# Patient Record
Sex: FEMALE | Race: BLACK OR AFRICAN AMERICAN | Employment: OTHER | URBAN - METROPOLITAN AREA
[De-identification: names, ages, dates, MRNs, and addresses within clinical notes are randomized per-mention and may not be internally consistent; named-entity substitution may affect disease eponyms.]

---

## 2017-01-17 DIAGNOSIS — E78.00 HYPERCHOLESTEREMIA: Primary | ICD-10-CM

## 2017-01-19 RX ORDER — ATORVASTATIN CALCIUM 40 MG/1
40 TABLET, FILM COATED ORAL DAILY
Qty: 90 TAB | Refills: 1 | Status: SHIPPED | OUTPATIENT
Start: 2017-01-19 | End: 2017-06-27 | Stop reason: SDUPTHER

## 2017-01-19 NOTE — TELEPHONE ENCOUNTER
[facility]itor 40mg every day.   Refill approved per verbal order per Dr Jarocho Hinkle.

## 2017-02-20 ENCOUNTER — TELEPHONE (OUTPATIENT)
Dept: CARDIOLOGY CLINIC | Age: 62
End: 2017-02-20

## 2017-02-20 NOTE — TELEPHONE ENCOUNTER
She needs information to sent to Dr. Mei Sanchez, [facility],  Phone [phone]. Please call her at [phone]. She did state that she also sent an email with more detail about this matter.      Thank you, Yousif Hsu

## 2017-02-27 ENCOUNTER — TELEPHONE (OUTPATIENT)
Dept: CARDIOLOGY CLINIC | Age: 62
End: 2017-02-27

## 2017-04-20 DIAGNOSIS — I10 ESSENTIAL HYPERTENSION: ICD-10-CM

## 2017-04-20 RX ORDER — LISINOPRIL 10 MG/1
10 TABLET ORAL DAILY
Qty: 90 TAB | Refills: 0 | Status: SHIPPED | OUTPATIENT
Start: 2017-04-20 | End: 2017-06-26 | Stop reason: SDUPTHER

## 2017-04-21 DIAGNOSIS — I10 ESSENTIAL HYPERTENSION: ICD-10-CM

## 2017-04-21 RX ORDER — BISOPROLOL FUMARATE AND HYDROCHLOROTHIAZIDE 5; 6.25 MG/1; MG/1
1 TABLET ORAL DAILY
Qty: 90 TAB | Refills: 0 | Status: SHIPPED | OUTPATIENT
Start: 2017-04-21 | End: 2017-07-05 | Stop reason: SDUPTHER

## 2017-04-21 NOTE — TELEPHONE ENCOUNTER
Refill is per verbal order of Dr. Trina Vences.    Requested Prescriptions     Pending Prescriptions Disp Refills    bisoprolol-hydroCHLOROthiazide (ZIAC) 5-6.25 mg per tablet 90 Tab 0     Sig: Take 1 Tab by mouth daily.

## 2017-06-09 ENCOUNTER — OFFICE VISIT (OUTPATIENT)
Dept: CARDIOLOGY CLINIC | Age: 62
End: 2017-06-09

## 2017-06-09 VITALS
DIASTOLIC BLOOD PRESSURE: 72 MMHG | HEIGHT: 65 IN | SYSTOLIC BLOOD PRESSURE: 140 MMHG | HEART RATE: 56 BPM | BODY MASS INDEX: 38.55 KG/M2 | WEIGHT: 231.4 LBS

## 2017-06-09 DIAGNOSIS — R00.2 PALPITATIONS: ICD-10-CM

## 2017-06-09 DIAGNOSIS — I10 ESSENTIAL HYPERTENSION: Primary | ICD-10-CM

## 2017-06-09 DIAGNOSIS — E78.5 HYPERLIPIDEMIA, UNSPECIFIED HYPERLIPIDEMIA TYPE: ICD-10-CM

## 2017-06-09 DIAGNOSIS — Z98.890 H/O MITRAL VALVE REPAIR: ICD-10-CM

## 2017-06-09 NOTE — PROGRESS NOTES
LAST OFFICE VISIT : 4/20/2016        ICD-10-CM ICD-9-CM   1. Essential hypertension I10 401.9   2. Hyperlipidemia, unspecified hyperlipidemia type E78.5 272.4   3. Palpitations R00.2 785.1   4. H/O mitral valve repair Z98.890 V15.1            Natalio Vega is a 65 y.o. female with hypertension, dyslipidemia, and mitral valve repair referred for 6 month follow up. Cardiac risk factors: obesity, hypertension, post-menopausal.  I have personally obtained the history from the patient. HISTORY OF PRESENTING ILLNESS      She is doing well. She has lost some weight with exercise. She does notes occasional right arm numbness. The patient denies chest pain/ shortness of breath, orthopnea, PND, LE edema, palpitations, syncope, presyncope or fatigue.         ACTIVE PROBLEM LIST     Patient Active Problem List    Diagnosis Date Noted    Mitral valve regurgitation 11/12/2010    Hypertension 11/12/2010    Hyperlipidemia 11/12/2010    Palpitations 11/12/2010           PAST MEDICAL HISTORY     Past Medical History:   Diagnosis Date    Diabetes ([facility] [address]     Hypertension     Mitral valve disorders     Other ill-defined conditions     mitral valve regurg    Sleep apnea with use of continuous positive airway pressure (CPAP)     Unspecified adverse effect of anesthesia     difficulty waking           PAST SURGICAL HISTORY     Past Surgical History:   Procedure Laterality Date    ABDOMEN SURGERY PROC UNLISTED  1980s    laparotomy    CARDIAC CATHETERIZATION      CHEST SURGERY PROCEDURE UNLISTED      mitral valve repair    HX GYN  1990    part hysterectomy    HX GYN  1987    c section          ALLERGIES     No Known Allergies       FAMILY HISTORY     Family History   Problem Relation Age of Onset    Heart Disease Mother     Cancer Mother     Hypertension Mother     Hypertension Father     Diabetes Father     Cancer Sister     Hypertension Sister     Hypertension Brother     Cancer Maternal Aunt     Diabetes Maternal Aunt     Cancer Maternal Grandmother     negative for cardiac disease       SOCIAL HISTORY     Social History     Social History    Marital status:      Spouse name: N/A    Number of children: N/A    Years of education: N/A     Occupational History     [employer]     Social History Main Topics    Smoking status: Never Smoker    Smokeless tobacco: None    Alcohol use 2.5 oz/week     5 Glasses of wine per week    Drug use: No    Sexual activity: Not Asked     Other Topics Concern    None     Social History Narrative         MEDICATIONS     Current Outpatient Prescriptions   Medication Sig    bisoprolol-hydroCHLOROthiazide (ZIAC) 5-6.25 mg per tablet Take 1 Tab by mouth daily.  lisinopril (PRINIVIL, ZESTRIL) 10 mg tablet Take 1 Tab by mouth daily.  atorvastatin (LIPITOR) 40 mg tablet Take 1 Tab by mouth daily.  multivitamin (ONE A DAY) tablet Take 1 Tab by mouth daily.  omega-3 fatty acids-vitamin e (FISH OIL) 1,000 mg cap Take 1 Cap by mouth daily.  magnesium oxide (MAG-OX) 400 mg tablet Take 400 mg by mouth daily.  metFORMIN (GLUCOPHAGE) 500 mg tablet Take  by mouth two (2) times daily (with meals).  CALCIUM CITRATE/VITAMIN D2 (CALCIUM CITRATE WITH D PO) Take 1 tablet by mouth daily.  aspirin delayed-release 81 mg tablet Take  by mouth daily. No current facility-administered medications for this visit. I have reviewed the nurses notes, vitals, problem list, allergy list, medical history, family, social history and medications. REVIEW OF SYMPTOMS      General: Pt denies excessive weight gain or loss. Pt is able to conduct ADL's  HEENT: Denies blurred vision, headaches, hearing loss, epistaxis and difficulty swallowing. Respiratory: Denies cough, congestion, shortness of breath, DOWLING, wheezing or stridor.   Cardiovascular: Denies precordial pain, palpitations, edema or PND  Gastrointestinal: Denies poor appetite, indigestion, abdominal pain or blood in stool  Genitourinary: Denies hematuria, dysuria, increased urinary frequency  Musculoskeletal: Denies joint pain or swelling from muscles or joints  Neurologic: Denies tremor, paresthesias, headache, or sensory motor disturbance  Psychiatric: Denies confusion, insomnia, depression  Integumentray: Denies rash, itching or ulcers. Hematologic: Denies easy bruising, bleeding     PHYSICAL EXAMINATION      Vitals:    06/09/17 1631   BP: 140/72   Pulse: (!) 56   Weight: 231 lb 6.4 oz (105 kg)   Height: 5' 5\" (1.651 m)     General: Well developed, in no acute distress. HEENT: No jaundice, oral mucosa moist, no oral ulcers  Neck: Supple, no stiffness, no lymphadenopathy, supple  Heart:  Normal S1/S2 negative S3 or S4. Regular, no murmur, gallop or rub, no jugular venous distention  Respiratory: Clear bilaterally x 4, no wheezing or rales     Extremities:  No edema, normal cap refill, no cyanosis. Musculoskeletal: No clubbing, no deformities  Neuro: A&Ox3, speech clear, gait stable, cooperative, no focal neurologic deficits  Skin: Skin color is normal. No rashes or lesions. Non diaphoretic, moist.  Vascular: 2+ pulses symmetric in all extremities         DIAGNOSTIC DATA     1. Echo  4/20/16- EF 60-65%, MV annular ring prosthesis exhibit normal function, TR mild  1/12/15- EF 55-60%, TR mild/mod, Pulm HTN mild ,Hx of TRR robotic assisted MVR using 29mm ATS adjustable ring There was mild regurgitation. 10/14/11: LVH, EF 55-60%     2. Loop  1/27/15 - SB 45-48 (only base line recorded)    3 . Cardiac catheterization   (10/14/10) : No significant CAD with severe MR  History TRR robotic assisted MVR using 29 mm ATS adjustable ring model [id number]     4.  Cholesterol profile   1/12/15: , HDL 58, ,    1/27/16: , HDL 57, LDL 90,   1/12/17- , HDL 49, LDL 90,        LABORATORY DATA            Lab Results   Component Value Date/Time    WBC 6.0 12/24/2012 06:15 AM    Hemoglobin (POC) 12.9 12/24/2012 06:26 AM    HGB 12.5 12/24/2012 06:15 AM    Hematocrit (POC) 38 12/24/2012 06:26 AM    HCT 38.6 12/24/2012 06:15 AM    PLATELET 650 73/28/4320 06:15 AM    MCV 83.2 12/24/2012 06:15 AM      Lab Results   Component Value Date/Time    Sodium 135 11/19/2010 03:55 AM    Potassium 4.2 11/19/2010 03:55 AM    Chloride 99 11/19/2010 03:55 AM    CO2 26 11/19/2010 03:55 AM    Anion gap 10 11/19/2010 03:55 AM    Glucose 64 11/19/2010 03:55 AM    BUN 10 11/19/2010 03:55 AM    Creatinine 1.0 11/19/2010 03:55 AM    BUN/Creatinine ratio 10 11/19/2010 03:55 AM    GFR est AA >60 11/19/2010 03:55 AM    GFR est non-AA >60 11/19/2010 03:55 AM    Calcium 9.8 11/19/2010 03:55 AM    Bilirubin, total 0.5 01/12/2017 10:02 AM    AST (SGOT) 18 01/12/2017 10:02 AM    Alk. phosphatase 89 01/12/2017 10:02 AM    Protein, total 7.3 01/12/2017 10:02 AM    Albumin 4.8 01/12/2017 10:02 AM    Globulin 3.2 06/18/2011 10:10 PM    A-G Ratio 1.5 06/18/2011 10:10 PM    ALT (SGPT) 23 01/12/2017 10:02 AM           ASSESSMENT/RECOMMENDATIONS:.      1. Mitral valve repair   -doing well with this  -will check in next 6 months to evaluate repair    -having some discomfort on the right side which could be nerve issues from using the DaVinci     2. Dyslipidemia  - lipids are at goal  -failed to get cholesterol checked before coming to office today  Will obtain FLP    3. Hypertension  -BP good on current medical regimen      4. Follow up in 6 months or PRN    Orders Placed This Encounter    HEPATIC FUNCTION PANEL    LIPID PANEL    2D ECHO COMPLETE ADULT (TTE) W OR WO CONTR     Standing Status:   Future     Standing Expiration Date:   6/29/2017     Order Specific Question:   Contrast Enhancement (Bubble Study, Definity, Optison) may be used if criteria listed in established evidence-based protocol has been identified.      Answer:   Yes     Order Specific Question:   Reason for Exam:     Answer:   chest pain,shortness of breath, AVR,MVR,AI, MR , AS        Follow-up Disposition:  Return in about 6 months (around 12/9/2017). I have discussed the diagnosis with  Isabella Herrera and the intended plan as seen in the above orders. Questions were answered concerning future plans. I have discussed medication side effects and warnings with the patient as well. Thank you,  Sebastien Harper MD for involving me in the care of  Isabella Herrera. Please do not hesitate to contact me for further questions/concerns. This note was written by rajan Reyes, as dictated by Christelle Plasencia MD.      Carson Carney. MD [place], [facility] [address]      [facility], [address] [place], [place] [address]Manuel Yehuda [address]      [phone] / [phone] Fax

## 2017-06-09 NOTE — MR AVS SNAPSHOT
Visit Information Date & Time Provider Department Dept. Phone Encounter #  
 6/9/2017  4:20 PM Shivani Fery MD [facility] [phone] [id number] Your Appointments 12/8/2017  3:00 PM  
ECHO CARDIOGRAMS 2D with ETHEL, Jose Nichols [facility] ([facility] SCHEDULING) Appt Note: echo at 3pm 6 mo fu appt dr Josy Orellana [address] [address]  
[phone]  
  
   
 [address] [address]  
  
    
 12/8/2017  4:20 PM  
ESTABLISHED PATIENT with Shivani Frey MD  
[facility] ([facility]O SCHEDULING) Appt Note: echo at 3pm 6 mo fu appt dr Josy Orellana [address] [address]  
[phone]  
  
   
 [address] [address] [address] [address] Upcoming Health Maintenance Date Due Hepatitis C Screening 1955 DTaP/Tdap/Td series (1 - Tdap) 3/20/1977 PAP AKA CERVICAL CYTOLOGY 3/20/1977 FOBT Q 1 YEAR AGE 50-75 3/20/2006 ZOSTER VACCINE AGE 60> 3/20/2016 BREAST CANCER SCRN MAMMOGRAM 4/30/2016 INFLUENZA AGE 9 TO ADULT 8/1/2017 Allergies as of 6/9/2017  Review Complete On: 6/9/2017 By: Shivani Frey MD  
 No Known Allergies Current Immunizations  Never Reviewed No immunizations on file. Not reviewed this visit You Were Diagnosed With   
  
 Codes Comments Essential hypertension    -  Primary ICD-10-CM: I10 
ICD-9-CM: 401.9 Hyperlipidemia, unspecified hyperlipidemia type     ICD-10-CM: E78.5 ICD-9-CM: 272.4 Palpitations     ICD-10-CM: R00.2 ICD-9-CM: 785.1 H/O mitral valve repair     ICD-10-CM: T30.135 ICD-9-CM: V15.1 Vitals BP Pulse Height(growth percentile) Weight(growth percentile) BMI OB Status 140/72 (!) 56 5' 5\" (1.651 m) 231 lb 6.4 oz (105 kg) 38.51 kg/m2 Hysterectomy Smoking Status Never Smoker Vitals History BMI and BSA Data Body Mass Index Body Surface Area 38.51 kg/m 2 2.19 m 2 Preferred Pharmacy Pharmacy Name Phone [facility] [address] [place] [address] [address] [phone] Your Updated Medication List  
  
   
This list is accurate as of: 6/9/17  4:52 PM.  Always use your most recent med list.  
  
  
  
  
 aspirin delayed-release 81 mg tablet Take  by mouth daily. atorvastatin 40 mg tablet Commonly known as:  LIPITOR Take 1 Tab by mouth daily. bisoprolol-hydroCHLOROthiazide 5-6.25 mg per tablet Commonly known as:  [facility] Take 1 Tab by mouth daily. CALCIUM CITRATE WITH D PO Take 1 tablet by mouth daily. FISH OIL 1,000 mg Cap Generic drug:  omega-3 fatty acids-vitamin e Take 1 Cap by mouth daily. lisinopril 10 mg tablet Commonly known as:  [place] Take 1 Tab by mouth daily. magnesium oxide 400 mg tablet Commonly known as:  MAG-OX Take 400 mg by mouth daily. metFORMIN 500 mg tablet Commonly known as:  GLUCOPHAGE Take  by mouth two (2) times daily (with meals). multivitamin tablet Commonly known as:  ONE A DAY Take 1 Tab by mouth daily. We Performed the Following HEPATIC FUNCTION PANEL [38953 CPT(R)] LIPID PANEL [64202 CPT(R)] To-Do List   
 06/09/2017 ECHO:  2D ECHO COMPLETE ADULT (TTE) W OR WO CONTR Introducing [facility]! Dear Kristi Harris: Thank you for requesting a [employer] account. Our records indicate that you already have an active [employer] account. You can access your account anytime at [email or web address] Did you know that you can access your hospital and ER discharge instructions at any time in [employer]? You can also review all of your test results from your hospital stay or ER visit. Additional Information If you have questions, please visit the Frequently Asked Questions section of the [employer] website at [email or web address]. [email or web address] Remember, [employer] is NOT to be used for urgent needs. For medical emergencies, dial 911. Now available from your iPhone and Android! Please provide this summary of care documentation to your next provider. Your primary care clinician is listed as Mili Bill. If you have any questions after today's visit, please call [phone].

## 2017-06-23 LAB
ALBUMIN SERPL-MCNC: 4.9 G/DL (ref 3.6–4.8)
ALP SERPL-CCNC: 86 IU/L (ref 39–117)
ALT SERPL-CCNC: 19 IU/L (ref 0–32)
AST SERPL-CCNC: 18 IU/L (ref 0–40)
BILIRUB DIRECT SERPL-MCNC: 0.14 MG/DL (ref 0–0.4)
BILIRUB SERPL-MCNC: 0.6 MG/DL (ref 0–1.2)
CHOLEST SERPL-MCNC: 171 MG/DL (ref 100–199)
HDLC SERPL-MCNC: 52 MG/DL
LDLC SERPL CALC-MCNC: 85 MG/DL (ref 0–99)
PROT SERPL-MCNC: 7.5 G/DL (ref 6–8.5)
TRIGL SERPL-MCNC: 168 MG/DL (ref 0–149)
VLDLC SERPL CALC-MCNC: 34 MG/DL (ref 5–40)

## 2017-06-26 DIAGNOSIS — I10 ESSENTIAL HYPERTENSION: ICD-10-CM

## 2017-06-26 RX ORDER — LISINOPRIL 10 MG/1
10 TABLET ORAL DAILY
Qty: 90 TAB | Refills: 3 | Status: SHIPPED | OUTPATIENT
Start: 2017-06-26 | End: 2018-02-21 | Stop reason: SDUPTHER

## 2017-06-26 NOTE — TELEPHONE ENCOUNTER
Medication refill request for:  Requested Prescriptions     Pending Prescriptions Disp Refills    lisinopril (PRINIVIL, ZESTRIL) 10 mg tablet 90 Tab 3     Sig: Take 1 Tab by mouth daily. Pharmacy verified.  Refill authorization approved per Dr. Haris Chacon

## 2017-06-27 RX ORDER — ATORVASTATIN CALCIUM 40 MG/1
40 TABLET, FILM COATED ORAL EVERY EVENING
Qty: 90 TAB | Refills: 1 | Status: SHIPPED | OUTPATIENT
Start: 2017-06-27 | End: 2019-05-20

## 2017-06-27 NOTE — TELEPHONE ENCOUNTER
Refill is per verbal order of Dr. Carolyn Armas.    Requested Prescriptions     Pending Prescriptions Disp Refills    atorvastatin (LIPITOR) 40 mg tablet 90 Tab 1     Sig: Take 1 Tab by mouth every evening.

## 2017-07-05 DIAGNOSIS — I10 ESSENTIAL HYPERTENSION: ICD-10-CM

## 2017-07-05 NOTE — TELEPHONE ENCOUNTER
Refill is per verbal order of Dr. Arnold Dick.    Requested Prescriptions     Pending Prescriptions Disp Refills    bisoprolol-hydroCHLOROthiazide (ZIAC) 5-6.25 mg per tablet 90 Tab 1     Sig: Take 1 Tab by mouth daily.

## 2017-07-06 RX ORDER — BISOPROLOL FUMARATE AND HYDROCHLOROTHIAZIDE 5; 6.25 MG/1; MG/1
1 TABLET ORAL DAILY
Qty: 90 TAB | Refills: 1 | Status: SHIPPED | OUTPATIENT
Start: 2017-07-06 | End: 2018-01-04 | Stop reason: SDUPTHER

## 2017-07-11 DIAGNOSIS — E78.00 HYPERCHOLESTEREMIA: Primary | ICD-10-CM

## 2018-01-04 DIAGNOSIS — I10 ESSENTIAL HYPERTENSION: ICD-10-CM

## 2018-01-04 RX ORDER — BISOPROLOL FUMARATE AND HYDROCHLOROTHIAZIDE 5; 6.25 MG/1; MG/1
1 TABLET ORAL DAILY
Qty: 90 TAB | Refills: 1 | Status: SHIPPED | OUTPATIENT
Start: 2018-01-04 | End: 2018-02-02 | Stop reason: ALTCHOICE

## 2018-01-04 NOTE — TELEPHONE ENCOUNTER
Refill is per verbal order of Dr. Elba Kayser.    Requested Prescriptions     Pending Prescriptions Disp Refills    bisoprolol-hydroCHLOROthiazide (ZIAC) 5-6.25 mg per tablet 90 Tab 1     Sig: Take 1 Tab by mouth daily.

## 2018-02-02 ENCOUNTER — CLINICAL SUPPORT (OUTPATIENT)
Dept: CARDIOLOGY CLINIC | Age: 63
End: 2018-02-02

## 2018-02-02 ENCOUNTER — OFFICE VISIT (OUTPATIENT)
Dept: CARDIOLOGY CLINIC | Age: 63
End: 2018-02-02

## 2018-02-02 VITALS
WEIGHT: 226 LBS | SYSTOLIC BLOOD PRESSURE: 116 MMHG | HEART RATE: 50 BPM | OXYGEN SATURATION: 97 % | DIASTOLIC BLOOD PRESSURE: 60 MMHG | BODY MASS INDEX: 37.61 KG/M2

## 2018-02-02 DIAGNOSIS — E78.5 HYPERLIPIDEMIA, UNSPECIFIED HYPERLIPIDEMIA TYPE: Chronic | ICD-10-CM

## 2018-02-02 DIAGNOSIS — I10 ESSENTIAL HYPERTENSION: Primary | Chronic | ICD-10-CM

## 2018-02-02 DIAGNOSIS — R00.2 PALPITATIONS: Chronic | ICD-10-CM

## 2018-02-02 DIAGNOSIS — I10 ESSENTIAL HYPERTENSION: Chronic | ICD-10-CM

## 2018-02-02 DIAGNOSIS — Z98.890 H/O MITRAL VALVE REPAIR: Primary | ICD-10-CM

## 2018-02-02 DIAGNOSIS — I34.0 MITRAL VALVE INSUFFICIENCY, UNSPECIFIED ETIOLOGY: ICD-10-CM

## 2018-02-02 RX ORDER — VITAMIN E 1000 UNIT
CAPSULE ORAL DAILY
COMMUNITY

## 2018-02-02 NOTE — PROGRESS NOTES
Pt has no complaints/no cardiac concerns    Visit Vitals    /60    Pulse (!) 50    Wt 226 lb (102.5 kg)    SpO2 97%    BMI 37.61 kg/m2

## 2018-02-02 NOTE — MR AVS SNAPSHOT
[address] [address] 
[phone] Patient: Kathrin Sanchez MRN: [id number] ROQ:2/31/[birth year]9 Visit Information Date & Time Provider Department Dept. Phone Encounter #  
 2/2/2018  4:00 PM Keyana Dos Santos MD [facility] Dejesus [phone] [id number] Your Appointments 2/12/2018  2:20 PM  
BLOOD PRESSURE with Keyana Dos Santos MD  
[facility] ([facility] SCHEDULING) Appt Note: 10 day BP check sll  
 [address] [address]  
[phone]  
  
   
 [address] [address] [address]  
  
    
 2/20/2018 11:00 AM  
New Patient with Preeti Deshpande MD  
[address] [address] Internal [facility] [address]) Appt Note: get est - CPE if time allows [address] [place]  
[address] [place]  
  
    
 8/10/2018  4:00 PM  
ESTABLISHED PATIENT with Keyana Dos Santos MD  
[facility] (LENO SCHEDULING) Appt Note: 6 mo fu appt  [address] [facility] [address] [address]  
[phone]  
  
   
 [address] [address] [address] Upcoming Health Maintenance Date Due Hepatitis C Screening 1955 DTaP/Tdap/Td series (1 - Tdap) 3/20/1977 PAP AKA CERVICAL CYTOLOGY 3/20/1977 FOBT Q 1 YEAR AGE 50-75 3/20/2006 ZOSTER VACCINE AGE 60> 1/20/2016 BREAST CANCER SCRN MAMMOGRAM 4/30/2016 Influenza Age 5 to Adult 8/1/2017 Allergies as of 2/2/2018  Review Complete On: 2/2/2018 By: Alexandra Youngblood LPN No Known Allergies Current Immunizations  Never Reviewed No immunizations on file. Not reviewed this visit You Were Diagnosed With   
  
 Codes Comments Essential hypertension    -  Primary ICD-10-CM: I10 
ICD-9-CM: 401.9 Vitals BP Pulse Weight(growth percentile) SpO2 BMI OB Status 116/60 (!) 50 226 lb (102.5 kg) 97% 37.61 kg/m2 Hysterectomy Smoking Status Never Smoker Vitals History BMI and BSA Data Body Mass Index Body Surface Area  
 37.61 kg/m 2 2.17 m 2 Preferred Pharmacy Pharmacy Name Phone [id number], [address] [place] [phone] Your Updated Medication List  
  
   
This list is accurate as of: 2/2/18  4:05 PM.  Always use your most recent med list.  
  
  
  
  
 aspirin delayed-release 81 mg tablet Take  by mouth daily. atorvastatin 40 mg tablet Commonly known as:  LIPITOR Take 1 Tab by mouth every evening. CALCIUM CITRATE WITH D PO Take 1 tablet by mouth daily. FISH OIL 1,000 mg Cap Generic drug:  omega-3 fatty acids-vitamin e Take 1 Cap by mouth daily. lisinopril 10 mg tablet Commonly known as:  [place] Take 1 Tab by mouth daily. magnesium oxide 400 mg tablet Commonly known as:  MAG-OX Take 400 mg by mouth daily. metFORMIN 500 mg tablet Commonly known as:  GLUCOPHAGE Take  by mouth two (2) times daily (with meals). multivitamin tablet Commonly known as:  ONE A DAY Take 1 Tab by mouth daily. OTHER  
daily. Indications: vitamin for hair and nails VITAMIN C 1,000 mg tablet Generic drug:  ascorbic acid (vitamin C) Take  by mouth daily. We Performed the Following AMB POC EKG ROUTINE W/ 12 LEADS, INTER & REP [55725 CPT(R)] Introducing [facility]! Dear Alphonso Maza: Thank you for requesting a [employer] account. Our records indicate that you already have an active [employer] account. You can access your account anytime at [email or web address] Did you know that you can access your hospital and ER discharge instructions at any time in [employer]? You can also review all of your test results from your hospital stay or ER visit. Additional Information If you have questions, please visit the Frequently Asked Questions section of the [employer] website at [email or web address] Remember, [address] is NOT to be used for urgent needs. For medical emergencies, dial 911. Now available from your iPhone and Android! Please provide this summary of care documentation to your next provider. Your primary care clinician is listed as Sera Mathis. If you have any questions after today's visit, please call [phone].

## 2018-02-02 NOTE — PROGRESS NOTES
LAST OFFICE VISIT : 2/2/2018        ICD-10-CM ICD-9-CM   1. Essential hypertension I10 401.9            Florinda Burns is a 65 y.o. female with hypertension, dyslipidemia, diabetes and mitral valve repair referred for 6 month follow up. Cardiac risk factors: family history, dyslipidemia, diabetes, obesity, hypertension, post-menopausal  I have personally obtained the history from the patient. HISTORY OF PRESENTING ILLNESS     Overall the pt states she is doing well. She denies any dizziness or lightheadedness. The pt states that she is using her rowing machine everyday. The patient denies chest pain/ shortness of breath, orthopnea, PND, LE edema, palpitations, syncope, presyncope or fatigue.          ACTIVE PROBLEM LIST     Patient Active Problem List    Diagnosis Date Noted    Mitral valve regurgitation 11/12/2010    Hypertension 11/12/2010    Hyperlipidemia 11/12/2010    Palpitations 11/12/2010           PAST MEDICAL HISTORY     Past Medical History:   Diagnosis Date    Diabetes ([address]     Hypertension     Mitral valve disorders(424.0)     Other ill-defined conditions(799.89)     mitral valve regurg    Sleep apnea with use of continuous positive airway pressure (CPAP)     Unspecified adverse effect of anesthesia     difficulty waking           PAST SURGICAL HISTORY     Past Surgical History:   Procedure Laterality Date    ABDOMEN SURGERY PROC UNLISTED  1980s    laparotomy    CARDIAC CATHETERIZATION      CHEST SURGERY PROCEDURE UNLISTED      mitral valve repair    HX GYN  1990    part hysterectomy    HX GYN  1987    c section          ALLERGIES     No Known Allergies       FAMILY HISTORY     Family History   Problem Relation Age of Onset    Heart Disease Mother     Cancer Mother     Hypertension Mother     Hypertension Father     Diabetes Father     Cancer Sister     Hypertension Sister     Hypertension Brother     Cancer Maternal Aunt     Diabetes Maternal Aunt     Cancer Maternal Grandmother     negative for cardiac disease       SOCIAL HISTORY     Social History     Social History    Marital status:      Spouse name: N/A    Number of children: N/A    Years of education: N/A     Occupational History     [employer]     Social History Main Topics    Smoking status: Never Smoker    Smokeless tobacco: Never Used    Alcohol use 2.5 oz/week     5 Glasses of wine per week    Drug use: No    Sexual activity: Not on file     Other Topics Concern    Not on file     Social History Narrative         MEDICATIONS     Current Outpatient Prescriptions   Medication Sig    ascorbic acid, vitamin C, (VITAMIN C) 1,000 mg tablet Take  by mouth daily.  OTHER daily. Indications: vitamin for hair and nails    atorvastatin (LIPITOR) 40 mg tablet Take 1 Tab by mouth every evening.  lisinopril (PRINIVIL, ZESTRIL) 10 mg tablet Take 1 Tab by mouth daily.  multivitamin (ONE A DAY) tablet Take 1 Tab by mouth daily.  omega-3 fatty acids-vitamin e (FISH OIL) 1,000 mg cap Take 1 Cap by mouth daily.  magnesium oxide (MAG-OX) 400 mg tablet Take 400 mg by mouth daily.  metFORMIN (GLUCOPHAGE) 500 mg tablet Take  by mouth two (2) times daily (with meals).  aspirin delayed-release 81 mg tablet Take  by mouth daily.  CALCIUM CITRATE/VITAMIN D2 (CALCIUM CITRATE WITH D PO) Take 1 tablet by mouth daily. No current facility-administered medications for this visit. I have reviewed the nurses notes, vitals, problem list, allergy list, medical history, family, social history and medications. REVIEW OF SYMPTOMS      General: Pt denies excessive weight gain or loss. Pt is able to conduct ADL's  HEENT: Denies blurred vision, headaches, hearing loss, epistaxis and difficulty swallowing. Respiratory: Denies cough, congestion, shortness of breath, DOWLING, wheezing or stridor.   Cardiovascular: Denies precordial pain, palpitations, edema or PND  Gastrointestinal: Denies poor appetite, indigestion, abdominal pain or blood in stool  Genitourinary: Denies hematuria, dysuria, increased urinary frequency  Musculoskeletal: Denies joint pain or swelling from muscles or joints  Neurologic: Denies tremor, paresthesias, headache, or sensory motor disturbance  Psychiatric: Denies confusion, insomnia, depression  Integumentray: Denies rash, itching or ulcers. Hematologic: Denies easy bruising, bleeding     PHYSICAL EXAMINATION      Vitals:    02/02/18 1537   BP: 116/60   Pulse: (!) 50   SpO2: 97%   Weight: 226 lb (102.5 kg)     General: Well developed, in no acute distress. HEENT: No jaundice, oral mucosa moist, no oral ulcers  Neck: Supple, no stiffness, no lymphadenopathy, supple  Heart:  Normal S1/S2 negative S3 or S4. Regular, no murmur, gallop or rub, no jugular venous distention  Respiratory: Clear bilaterally x 4, no wheezing or rales  Abdomen:   Soft, non-tender, bowel sounds are active.   Extremities:  No edema, normal cap refill, no cyanosis. Musculoskeletal: No clubbing, no deformities  Neuro: A&Ox3, speech clear, gait stable, cooperative, no focal neurologic deficits  Skin: Skin color is normal. No rashes or lesions. Non diaphoretic, moist.  Vascular: 2+ pulses symmetric in all extremities        EKG:      DIAGNOSTIC DATA     1. Echo  4/20/16- EF 60-65%, MV annular ring prosthesis exhibit normal function, TR mild  1/12/15- EF 55-60%, TR mild/mod, Pulm HTN mild ,Hx of TRR robotic assisted MVR using 29mm ATS adjustable ring There was mild regurgitation. 10/14/11: LVH, EF 55-60%     2. Loop  1/27/15 - SB 45-48 (only base line recorded)    3 . Cardiac catheterization   (10/14/10) : No significant CAD with severe MR  History TRR robotic assisted MVR using 29 mm ATS adjustable ring model [id number]     4.  Cholesterol profile   1/12/15: , HDL 58, ,    1/27/16: , HDL 57, LDL 90,   1/12/17- , HDL 49, LDL 90, TG 107  6/22/17- , HDL 52, LDL 85,          LABORATORY DATA            Lab Results   Component Value Date/Time    WBC 6.0 12/24/2012 06:15 AM    Hemoglobin (POC) 12.9 12/24/2012 06:26 AM    HGB 12.5 12/24/2012 06:15 AM    Hematocrit (POC) 38 12/24/2012 06:26 AM    HCT 38.6 12/24/2012 06:15 AM    PLATELET 617 15/70/6981 06:15 AM    MCV 83.2 12/24/2012 06:15 AM      Lab Results   Component Value Date/Time    Sodium 135 11/19/2010 03:55 AM    Potassium 4.2 11/19/2010 03:55 AM    Chloride 99 11/19/2010 03:55 AM    CO2 26 11/19/2010 03:55 AM    Anion gap 10 11/19/2010 03:55 AM    Glucose 64 11/19/2010 03:55 AM    BUN 10 11/19/2010 03:55 AM    Creatinine 1.0 11/19/2010 03:55 AM    BUN/Creatinine ratio 10 11/19/2010 03:55 AM    GFR est AA >60 11/19/2010 03:55 AM    GFR est non-AA >60 11/19/2010 03:55 AM    Calcium 9.8 11/19/2010 03:55 AM    Bilirubin, total 0.6 06/22/2017 09:25 AM    AST (SGOT) 18 06/22/2017 09:25 AM    Alk. phosphatase 86 06/22/2017 09:25 AM    Protein, total 7.5 06/22/2017 09:25 AM    Albumin 4.9 06/22/2017 09:25 AM    Globulin 3.2 06/18/2011 10:10 PM    A-G Ratio 1.5 06/18/2011 10:10 PM    ALT (SGPT) 19 06/22/2017 09:25 AM           ASSESSMENT/RECOMMENDATIONS:.      1. Mitral valve repair  - She is doing fine, no chest discomfort, will read her echo that was performed today. 2. Dyslipidemia  - Lipids are at goal.   3. Hypertension  - Blood pressure is under good control, I am going to discontinue her Ziac as her heart rate is low. I will have her return in 10 days for a BP check. 4. BILLY  - She is wearing her CPAP machine. 5. Diabetes mellitus  - last HGB A1C was 6.7%, goal should be 6% or below. 6. Return in 6 months or PRN. Orders Placed This Encounter    AMB POC EKG ROUTINE W/ 12 LEADS, INTER & REP     Order Specific Question:   Reason for Exam:     Answer:   htn    ascorbic acid, vitamin C, (VITAMIN C) 1,000 mg tablet     Sig: Take  by mouth daily.  OTHER     Sig: daily. Indications: vitamin for hair and nails        Follow-up Disposition: Not on File      I have discussed the diagnosis with  Maile Lee and the intended plan as seen in the above orders. Questions were answered concerning future plans. I have discussed medication side effects and warnings with the patient as well. Thank you,  Sinan Alba MD for involving me in the care of  Maile Lee. Please do not hesitate to contact me for further questions/concerns. ADDENDUM:    (4/20/18): Ms. Tony is a low cardiac risk for a low risk procedure and may proceed. No cardiac testing is  needed. Please call if you have any additional questions. Kwame Pearce MD, [address] [place] [address] Rd., [address]      [facility], [address] [place] [address]      [phone] / [phone] Fax

## 2018-02-06 ENCOUNTER — DOCUMENTATION ONLY (OUTPATIENT)
Dept: CARDIOLOGY CLINIC | Age: 63
End: 2018-02-06

## 2018-02-07 ENCOUNTER — TELEPHONE (OUTPATIENT)
Dept: CARDIOLOGY CLINIC | Age: 63
End: 2018-02-07

## 2018-02-12 ENCOUNTER — CLINICAL SUPPORT (OUTPATIENT)
Dept: CARDIOLOGY CLINIC | Age: 63
End: 2018-02-12

## 2018-02-12 VITALS — HEART RATE: 60 BPM | SYSTOLIC BLOOD PRESSURE: 124 MMHG | DIASTOLIC BLOOD PRESSURE: 72 MMHG

## 2018-02-12 DIAGNOSIS — R00.1 BRADYCARDIA: Primary | ICD-10-CM

## 2018-02-21 ENCOUNTER — OFFICE VISIT (OUTPATIENT)
Dept: INTERNAL MEDICINE CLINIC | Age: 63
End: 2018-02-21

## 2018-02-21 VITALS
TEMPERATURE: 98.9 F | HEART RATE: 85 BPM | OXYGEN SATURATION: 97 % | SYSTOLIC BLOOD PRESSURE: 154 MMHG | HEIGHT: 64 IN | BODY MASS INDEX: 38.48 KG/M2 | WEIGHT: 225.4 LBS | RESPIRATION RATE: 16 BRPM | DIASTOLIC BLOOD PRESSURE: 86 MMHG

## 2018-02-21 DIAGNOSIS — R79.9 ABNORMAL BLOOD CHEMISTRY: ICD-10-CM

## 2018-02-21 DIAGNOSIS — R73.03 PREDIABETES: ICD-10-CM

## 2018-02-21 DIAGNOSIS — E66.9 OBESITY (BMI 30-39.9): ICD-10-CM

## 2018-02-21 DIAGNOSIS — Z00.00 ROUTINE GENERAL MEDICAL EXAMINATION AT A HEALTH CARE FACILITY: ICD-10-CM

## 2018-02-21 DIAGNOSIS — I10 ESSENTIAL HYPERTENSION: Primary | ICD-10-CM

## 2018-02-21 DIAGNOSIS — E55.9 VITAMIN D DEFICIENCY: ICD-10-CM

## 2018-02-21 RX ORDER — LISINOPRIL 10 MG/1
10 TABLET ORAL DAILY
Qty: 90 TAB | Refills: 3 | Status: SHIPPED | OUTPATIENT
Start: 2018-02-21 | End: 2019-03-18 | Stop reason: SDUPTHER

## 2018-02-21 RX ORDER — METFORMIN HYDROCHLORIDE 500 MG/1
500 TABLET ORAL 2 TIMES DAILY WITH MEALS
Qty: 180 TAB | Refills: 1 | Status: SHIPPED | OUTPATIENT
Start: 2018-02-21 | End: 2018-08-04 | Stop reason: SDUPTHER

## 2018-02-21 RX ORDER — METFORMIN HYDROCHLORIDE 500 MG/1
500 TABLET ORAL 2 TIMES DAILY WITH MEALS
Qty: 30 TAB | Refills: 0 | Status: SHIPPED | OUTPATIENT
Start: 2018-02-21 | End: 2019-05-20 | Stop reason: SDUPTHER

## 2018-02-21 RX ORDER — LANOLIN ALCOHOL/MO/W.PET/CERES
400 CREAM (GRAM) TOPICAL DAILY
COMMUNITY
End: 2019-05-20 | Stop reason: SDUPTHER

## 2018-02-21 NOTE — PATIENT INSTRUCTIONS
It was a pleasure to meet you! As discussed:    Health Maintenance   I have ordered your age appropriate labs please complete them. You will need to fast 10-12hrs before your appointment. Start paying more attention to your diet and start exercising. Goal for exercise is 150minutes of moderate exercise a week and diet goal is to eat 50% fruits and vegetables with minimal sugar, fat and oil daily. See health.gov or choosemyplate.gov for more details. Your cervical cancer and breast cancer screening will be completed by your ob/ gyn as scheduled. Learning About Diabetes Food Guidelines  Your Care Instructions  Meal planning is important to manage diabetes. It helps keep your blood sugar at a target level (which you set with your doctor). You don't have to eat special foods. You can eat what your family eats, including sweets once in a while. But you do have to pay attention to how often you eat and how much you eat of certain foods. You may want to work with a dietitian or a certified diabetes educator (CDE) to help you plan meals and snacks. A dietitian or CDE can also help you lose weight if that is one of your goals. What should you know about eating carbs? Managing the amount of carbohydrate (carbs) you eat is an important part of healthy meals when you have diabetes. Carbohydrate is found in many foods. · Learn which foods have carbs. And learn the amounts of carbs in different foods. ¨ Bread, cereal, pasta, and rice have about 15 grams of carbs in a serving. A serving is 1 slice of bread (1 ounce), ½ cup of cooked cereal, or 1/3 cup of cooked pasta or rice. ¨ Fruits have 15 grams of carbs in a serving. A serving is 1 small fresh fruit, such as an apple or orange; ½ of a banana; ½ cup of cooked or canned fruit; ½ cup of fruit juice; 1 cup of melon or raspberries; or 2 tablespoons of dried fruit. ¨ Milk and no-sugar-added yogurt have 15 grams of carbs in a serving.  A serving is 1 cup of milk or 2/3 cup of no-sugar-added yogurt. ¨ Starchy vegetables have 15 grams of carbs in a serving. A serving is ½ cup of mashed potatoes or sweet potato; 1 cup winter squash; ½ of a small baked potato; ½ cup of cooked beans; or ½ cup cooked corn or green peas. · Learn how much carbs to eat each day and at each meal. A dietitian or CDE can teach you how to keep track of the amount of carbs you eat. This is called carbohydrate counting. · If you are not sure how to count carbohydrate grams, use the Plate Method to plan meals. It is a good, quick way to make sure that you have a balanced meal. It also helps you spread carbs throughout the day. ¨ Divide your plate by types of foods. Put non-starchy vegetables on half the plate, meat or other protein food on one-quarter of the plate, and a grain or starchy vegetable in the final quarter of the plate. To this you can add a small piece of fruit and 1 cup of milk or yogurt, depending on how many carbs you are supposed to eat at a meal.  · Try to eat about the same amount of carbs at each meal. Do not \"save up\" your daily allowance of carbs to eat at one meal.  · Proteins have very little or no carbs per serving. Examples of proteins are beef, chicken, turkey, fish, eggs, tofu, cheese, cottage cheese, and peanut butter. A serving size of meat is 3 ounces, which is about the size of a deck of cards. Examples of meat substitute serving sizes (equal to 1 ounce of meat) are 1/4 cup of cottage cheese, 1 egg, 1 tablespoon of peanut butter, and ½ cup of tofu. How can you eat out and still eat healthy? · Learn to estimate the serving sizes of foods that have carbohydrate. If you measure food at home, it will be easier to estimate the amount in a serving of restaurant food. · If the meal you order has too much carbohydrate (such as potatoes, corn, or baked beans), ask to have a low-carbohydrate food instead. Ask for a salad or green vegetables.   · If you use insulin, check your blood sugar before and after eating out to help you plan how much to eat in the future. · If you eat more carbohydrate at a meal than you had planned, take a walk or do other exercise. This will help lower your blood sugar. What else should you know? · Limit saturated fat, such as the fat from meat and dairy products. This is a healthy choice because people who have diabetes are at higher risk of heart disease. So choose lean cuts of meat and nonfat or low-fat dairy products. Use olive or canola oil instead of butter or shortening when cooking. · Don't skip meals. Your blood sugar may drop too low if you skip meals and take insulin or certain medicines for diabetes. · Check with your doctor before you drink alcohol. Alcohol can cause your blood sugar to drop too low. Alcohol can also cause a bad reaction if you take certain diabetes medicines. Follow-up care is a key part of your treatment and safety. Be sure to make and go to all appointments, and call your doctor if you are having problems. It's also a good idea to know your test results and keep a list of the medicines you take. Where can you learn more? Go to http://andrea-christo.info/. Enter C842 in the search box to learn more about \"Learning About Diabetes Food Guidelines. \"  Current as of: March 13, 2017  Content Version: 11.4  © 7980-7060 [employer]. Care instructions adapted under license by [employer] (which disclaims liability or warranty for this information). If you have questions about a medical condition or this instruction, always ask your healthcare professional. [place] [address] any warranty or liability for your use of this information.

## 2018-02-21 NOTE — MR AVS SNAPSHOT
[address] [facility] [address] [facility] 
[phone] Patient: Carine Arellano MRN: [id number] MPS:9/15/7463 Visit Information Date & Time Provider Department Dept. Phone Encounter #  
 2/21/2018  1:30 PM Erich Wang MD [address] [address] Internal Medicine [phone] [id number] Follow-up Instructions Return in about 3 months (around 5/21/2018) for Physical with PAP - 30 minute appointment. Your Appointments 8/10/2018  4:00 PM  
ESTABLISHED PATIENT with Elle Sanderson MD  
[facility] (LENO SCHEDULING) Appt Note: 6 mo fu appt  [address] [address] [phone]  
  
   
 [address] [address] Upcoming Health Maintenance Date Due Hepatitis C Screening 1955 DTaP/Tdap/Td series (1 - Tdap) 3/20/1977 PAP AKA CERVICAL CYTOLOGY 3/20/1977 FOBT Q 1 YEAR AGE 50-75 3/20/2006 ZOSTER VACCINE AGE 60> 1/20/2016 BREAST CANCER SCRN MAMMOGRAM 4/30/2016 Influenza Age 5 to Adult 8/1/2017 Allergies as of 2/21/2018  Review Complete On: 2/21/2018 By: Erich Wang MD  
  
 Severity Noted Reaction Type Reactions Other Plant, Animal, Environmental  02/21/2018    Other (comments)  
 hayfever Current Immunizations  Never Reviewed No immunizations on file. Not reviewed this visit You Were Diagnosed With   
  
 Codes Comments Essential hypertension    -  Primary ICD-10-CM: I10 
ICD-9-CM: 401.9 Routine general medical examination at a health care facility     ICD-10-CM: Z00.00 ICD-9-CM: V70.0 Prediabetes     ICD-10-CM: R73.03 
ICD-9-CM: 790.29 Vitamin D deficiency     ICD-10-CM: E55.9 ICD-9-CM: 268.9 Abnormal blood chemistry     ICD-10-CM: R79.9 ICD-9-CM: 790.6 Obesity (BMI 30-39. 9)     ICD-10-CM: E66.9 ICD-9-CM: 278.00 Vitals BP Pulse Temp Resp Height(growth percentile) Weight(growth percentile) 154/86 (BP 1 Location: Right arm, BP Patient Position: Sitting) 85 98.9 °F (37.2 °C) (Oral) 16 5' 3.82\" (1.621 m) 225 lb 6.4 oz (102.2 kg) SpO2 BMI OB Status Smoking Status 97% 38.91 kg/m2 Hysterectomy Never Smoker Vitals History BMI and BSA Data Body Mass Index Body Surface Area  
 38.91 kg/m 2 2.15 m 2 Preferred Pharmacy Pharmacy Name Phone [id number], [address] [facility] [place] [phone] Your Updated Medication List  
  
   
This list is accurate as of 2/21/18  2:16 PM.  Always use your most recent med list.  
  
  
  
  
 aspirin delayed-release 81 mg tablet Take  by mouth daily. atorvastatin 40 mg tablet Commonly known as:  LIPITOR Take 1 Tab by mouth every evening. CALCIUM CITRATE WITH D PO Take 1 tablet by mouth daily. FISH OIL 1,000 mg Cap Generic drug:  omega-3 fatty acids-vitamin e Take 1 Cap by mouth daily. lisinopril 10 mg tablet Commonly known as:  Leeland Nutley Take 1 Tab by mouth daily. * magnesium oxide 400 mg tablet Commonly known as:  MAG-OX Take 400 mg by mouth daily. * magnesium oxide 400 mg tablet Commonly known as:  MAG-OX Take 400 mg by mouth daily. metFORMIN 500 mg tablet Commonly known as:  GLUCOPHAGE Take 1 Tab by mouth two (2) times daily (with meals). multivitamin tablet Commonly known as:  ONE A DAY Take 1 Tab by mouth daily. OTHER  
daily. Indications: vitamin for hair and nails OTHER Indications: Tumeric VITAMIN C 1,000 mg tablet Generic drug:  ascorbic acid (vitamin C) Take  by mouth daily. * Notice: This list has 2 medication(s) that are the same as other medications prescribed for you. Read the directions carefully, and ask your doctor or other care provider to review them with you. Prescriptions Sent to Pharmacy Refills  
 metFORMIN (GLUCOPHAGE) 500 mg tablet 1 Sig: Take 1 Tab by mouth two (2) times daily (with meals). Class: Normal  
 Pharmacy: [address] [place] [address] Ph #: [phone] Route: Oral  
 lisinopril (PRINIVIL, ZESTRIL) 10 mg tablet 3 Sig: Take 1 Tab by mouth daily. Class: Normal  
 Pharmacy: [address] [place] [address] Ph #: [phone] Route: Oral  
  
We Performed the Following CBC WITH AUTOMATED DIFF [06128 CPT(R)] HEMOGLOBIN A1C WITH EAG [62423 CPT(R)] HEPATITIS C AB [04358 CPT(R)] LIPID PANEL [15698 CPT(R)] METABOLIC PANEL, COMPREHENSIVE [98588 CPT(R)] REFERRAL TO NUTRITION [REF50 Custom] Comments:  
 Please evaluate patient for weight management; obesity THYROID PANEL [id number] CPT(R)] TSH 3RD GENERATION [30444 CPT(R)] VITAMIN D, 25 HYDROXY [id number] CPT(R)] Follow-up Instructions Return in about 3 months (around 5/21/2018) for Physical with PAP - 30 minute appointment. Referral Information Referral ID Referred By Referred To  
  
 [id number] Maria C Pat   
   [address] [place] [address] Phone: [phone] Visits Status Start Date End Date 1 New Request 2/21/18 2/21/19 If your referral has a status of pending review or denied, additional information will be sent to support the outcome of this decision. Patient Instructions It was a pleasure to meet you! As discussed: 
 
Health Maintenance I have ordered your age appropriate labs please complete them. You will need to fast 10-12hrs before your appointment. Start paying more attention to your diet and start exercising. Goal for exercise is 150minutes of moderate exercise a week and diet goal is to eat 50% fruits and vegetables with minimal sugar, fat and oil daily.  See health.gov or chooseFidelisplate.gov for more details. Your cervical cancer and breast cancer screening will be completed by your ob/ gyn as scheduled. Learning About Diabetes Food Guidelines Your Care Instructions Meal planning is important to manage diabetes. It helps keep your blood sugar at a target level (which you set with your doctor). You don't have to eat special foods. You can eat what your family eats, including sweets once in a while. But you do have to pay attention to how often you eat and how much you eat of certain foods. You may want to work with a dietitian or a certified diabetes educator (CDE) to help you plan meals and snacks. A dietitian or CDE can also help you lose weight if that is one of your goals. What should you know about eating carbs? Managing the amount of carbohydrate (carbs) you eat is an important part of healthy meals when you have diabetes. Carbohydrate is found in many foods. · Learn which foods have carbs. And learn the amounts of carbs in different foods. ¨ Bread, cereal, pasta, and rice have about 15 grams of carbs in a serving. A serving is 1 slice of bread (1 ounce), ½ cup of cooked cereal, or 1/3 cup of cooked pasta or rice. ¨ Fruits have 15 grams of carbs in a serving. A serving is 1 small fresh fruit, such as an apple or orange; ½ of a banana; ½ cup of cooked or canned fruit; ½ cup of fruit juice; 1 cup of melon or raspberries; or 2 tablespoons of dried fruit. ¨ Milk and no-sugar-added yogurt have 15 grams of carbs in a serving. A serving is 1 cup of milk or 2/3 cup of no-sugar-added yogurt. ¨ Starchy vegetables have 15 grams of carbs in a serving. A serving is ½ cup of mashed potatoes or sweet potato; 1 cup winter squash; ½ of a small baked potato; ½ cup of cooked beans; or ½ cup cooked corn or green peas. · Learn how much carbs to eat each day and at each meal. A dietitian or CDE can teach you how to keep track of the amount of carbs you eat.  This is called carbohydrate counting. · If you are not sure how to count carbohydrate grams, use the Plate Method to plan meals. It is a good, quick way to make sure that you have a balanced meal. It also helps you spread carbs throughout the day. ¨ Divide your plate by types of foods. Put non-starchy vegetables on half the plate, meat or other protein food on one-quarter of the plate, and a grain or starchy vegetable in the final quarter of the plate. To this you can add a small piece of fruit and 1 cup of milk or yogurt, depending on how many carbs you are supposed to eat at a meal. 
· Try to eat about the same amount of carbs at each meal. Do not \"save up\" your daily allowance of carbs to eat at one meal. 
· Proteins have very little or no carbs per serving. Examples of proteins are beef, chicken, turkey, fish, eggs, tofu, cheese, cottage cheese, and peanut butter. A serving size of meat is 3 ounces, which is about the size of a deck of cards. Examples of meat substitute serving sizes (equal to 1 ounce of meat) are 1/4 cup of cottage cheese, 1 egg, 1 tablespoon of peanut butter, and ½ cup of tofu. How can you eat out and still eat healthy? · Learn to estimate the serving sizes of foods that have carbohydrate. If you measure food at home, it will be easier to estimate the amount in a serving of restaurant food. · If the meal you order has too much carbohydrate (such as potatoes, corn, or baked beans), ask to have a low-carbohydrate food instead. Ask for a salad or green vegetables. · If you use insulin, check your blood sugar before and after eating out to help you plan how much to eat in the future. · If you eat more carbohydrate at a meal than you had planned, take a walk or do other exercise. This will help lower your blood sugar. What else should you know? · Limit saturated fat, such as the fat from meat and dairy products.  This is a healthy choice because people who have diabetes are at higher risk of heart disease. So choose lean cuts of meat and nonfat or low-fat dairy products. Use olive or canola oil instead of butter or shortening when cooking. · Don't skip meals. Your blood sugar may drop too low if you skip meals and take insulin or certain medicines for diabetes. · Check with your doctor before you drink alcohol. Alcohol can cause your blood sugar to drop too low. Alcohol can also cause a bad reaction if you take certain diabetes medicines. Follow-up care is a key part of your treatment and safety. Be sure to make and go to all appointments, and call your doctor if you are having problems. It's also a good idea to know your test results and keep a list of the medicines you take. Where can you learn more? Go to http://andrea-christo.info/. Enter G455 in the search box to learn more about \"Learning About Diabetes Food Guidelines. \" Current as of: March 13, 2017 Content Version: 11.4 © 6405-3043 [employer]. Care instructions adapted under license by [employer] (which disclaims liability or warranty for this information). If you have questions about a medical condition or this instruction, always ask your healthcare professional. [address] any warranty or liability for your use of this information. Introducing [facility]! Dear Jessica Gonzalez: Thank you for requesting a [employer] account. Our records indicate that you already have an active [employer] account. You can access your account anytime at [email or web address] Did you know that you can access your hospital and ER discharge instructions at any time in [employer]? You can also review all of your test results from your hospital stay or ER visit. Additional Information If you have questions, please visit the Frequently Asked Questions section of the [employer] website at [email or web address] Remember, [email or web address] is NOT to be used for urgent needs. For medical emergencies, dial 911. Now available from your iPhone and Android! Please provide this summary of care documentation to your next provider. Your primary care clinician is listed as Jasbir Heading. If you have any questions after today's visit, please call [phone].

## 2018-02-21 NOTE — PROGRESS NOTES
HISTORY OF PRESENT ILLNESS  Marian Bender is a 65 y.o. female. HPI  New Patient  Marian Bender is a new patient. Prior care: her prior care was at Dr. Mindy Pedraza  . Last seen there . Records have been requested. ER Visits/ Hospitalizations:     Health Maintenance  Breast Cancer screening: due 2018  Colorectal Cancer screening: Colonoscopy 2017  Cervical Cancer screening: Due now   FHx: Multiple Myeloma, Breast Cancer, Prostate CA, Thyroid CA     Diabetes Review:  The patient has diabetes, hypertension, hyperlipidemia and obesity. Strong FHx of Heart Disease. Body mass index is 38.91 kg/(m^2). Diet and Lifestyle: nonsmoker  Home Glucose  Monitoring: is not measured at home. Pertinent ROS: taking medications as instructed- Has not taken her medication in 3 days because it ran out, no medication side effects noted, no TIA's, no chest pain on exertion, no dyspnea on exertion, no swelling of ankles. SHx: Sister is my patient Willow Jasso; . 2 children Father living. [place] native;    Has friend dying of liver cancer. She has concern about a spot on her liver. Review of Systems   Constitutional: Negative for diaphoresis, fever and weight loss. Eyes: Negative for blurred vision and pain. Respiratory: Negative for shortness of breath. Cardiovascular: Negative for chest pain, orthopnea and leg swelling. Neurological: Negative for dizziness, focal weakness and headaches. Occasional vertigo    Psychiatric/Behavioral: Negative for depression. Patient Active Problem List    Diagnosis Date Noted    Mitral valve regurgitation 11/12/2010    Hypertension 11/12/2010    Hyperlipidemia 11/12/2010    Palpitations 11/12/2010       Current Outpatient Prescriptions   Medication Sig Dispense Refill    magnesium oxide (MAG-OX) 400 mg tablet Take 400 mg by mouth daily.       OTHER Indications: Tumeric      ascorbic acid, vitamin C, (VITAMIN C) 1,000 mg tablet Take  by mouth daily.      OTHER daily. Indications: vitamin for hair and nails      atorvastatin (LIPITOR) 40 mg tablet Take 1 Tab by mouth every evening. 90 Tab 1    lisinopril (PRINIVIL, ZESTRIL) 10 mg tablet Take 1 Tab by mouth daily. 90 Tab 3    multivitamin (ONE A DAY) tablet Take 1 Tab by mouth daily.  omega-3 fatty acids-vitamin e (FISH OIL) 1,000 mg cap Take 1 Cap by mouth daily.  metFORMIN (GLUCOPHAGE) 500 mg tablet Take  by mouth two (2) times daily (with meals).  aspirin delayed-release 81 mg tablet Take  by mouth daily.  magnesium oxide (MAG-OX) 400 mg tablet Take 400 mg by mouth daily.  CALCIUM CITRATE/VITAMIN D2 (CALCIUM CITRATE WITH D PO) Take 1 tablet by mouth daily. Allergies   Allergen Reactions    Other Plant, Animal, Environmental Other (comments)     hayfever      Visit Vitals    /86 (BP 1 Location: Right arm, BP Patient Position: Sitting)    Pulse 85    Temp 98.9 °F (37.2 °C) (Oral)    Resp 16    Ht 5' 3.82\" (1.621 m)    Wt 225 lb 6.4 oz (102.2 kg)    SpO2 97%    BMI 38.91 kg/m2       Physical Exam   Constitutional: She is oriented to person, place, and time. She appears well-developed. No distress. Eyes: Conjunctivae are normal.   Neck: Neck supple. No thyromegaly present. Cardiovascular: Normal rate, regular rhythm and normal heart sounds. Pulmonary/Chest: Effort normal and breath sounds normal. No respiratory distress. She has no wheezes. She has no rales. She exhibits no tenderness. Musculoskeletal: She exhibits edema (Ble: trace pretibal edema ). Lymphadenopathy:     She has no cervical adenopathy. Neurological: She is alert and oriented to person, place, and time. Skin: Skin is warm. Psychiatric: She has a normal mood and affect.      Lab Results  Component Value Date/Time   WBC 6.0 12/24/2012 06:15 AM   HGB 12.5 12/24/2012 06:15 AM   Hemoglobin (POC) 12.9 12/24/2012 06:26 AM   HCT 38.6 12/24/2012 06:15 AM   Hematocrit (POC) 38 12/24/2012 06:26 AM   PLATELET 038 03/29/4055 06:15 AM   MCV 83.2 12/24/2012 06:15 AM     Lab Results  Component Value Date/Time   Hemoglobin A1c 6.5 (H) 01/12/2015 12:44 PM   Hemoglobin A1c 6.1 (H) 11/12/2010 10:12 AM   Hemoglobin A1c, External 6.7 01/28/2016   Glucose 64 (L) 11/19/2010 03:55 AM   Glucose (POC) 223 (H) 12/24/2012 06:26 AM   Glucose (POC) 93 11/19/2010 01:22 PM   LDL, calculated 85 06/22/2017 09:25 AM   Creatinine (POC) 0.7 12/24/2012 06:26 AM   Creatinine 1.0 11/19/2010 03:55 AM      Lab Results  Component Value Date/Time   Cholesterol, total 171 06/22/2017 09:25 AM   HDL Cholesterol 52 06/22/2017 09:25 AM   LDL, calculated 85 06/22/2017 09:25 AM   LDL-C, External 90 01/28/2016   Triglyceride 168 (H) 06/22/2017 09:25 AM   CHOL/HDL Ratio 3.0 07/06/2009 08:34 AM     Lab Results  Component Value Date/Time   ALT (SGPT) 19 06/22/2017 09:25 AM   AST (SGOT) 18 06/22/2017 09:25 AM   Alk.  phosphatase 86 06/22/2017 09:25 AM   Bilirubin, direct 0.14 06/22/2017 09:25 AM   Bilirubin, total 0.6 06/22/2017 09:25 AM   Albumin 4.9 (H) 06/22/2017 09:25 AM   Protein, total 7.5 06/22/2017 09:25 AM   INR 1.2 (H) 11/17/2010 05:00 PM   Prothrombin time 12.5 (H) 11/17/2010 05:00 PM   PLATELET 379 53/64/7174 06:15 AM       Lab Results  Component Value Date/Time   GFR est non-AA >60 11/19/2010 03:55 AM   GFRNA, POC >60 12/24/2012 06:26 AM   GFR est AA >60 11/19/2010 03:55 AM   GFRAA, POC >60 12/24/2012 06:26 AM   Creatinine 1.0 11/19/2010 03:55 AM   Creatinine (POC) 0.7 12/24/2012 06:26 AM   BUN 10 11/19/2010 03:55 AM   BUN (POC) 12 12/24/2012 06:26 AM   Sodium 135 (L) 11/19/2010 03:55 AM   Sodium (POC) 144 12/24/2012 06:26 AM   Potassium 4.2 11/19/2010 03:55 AM   Potassium (POC) 4.5 12/24/2012 06:26 AM   Chloride 99 11/19/2010 03:55 AM   Chloride (POC) 110 (H) 12/24/2012 06:26 AM   CO2 26 11/19/2010 03:55 AM   Magnesium 2.0 11/19/2010 03:55 AM     Lab Results  Component Value Date/Time   TSH 0.879 01/12/2015 12:44 PM   T3 Uptake 31 01/12/2015 12:44 PM   T4, Total 8.5 01/12/2015 12:44 PM      Lab Results   Component Value Date/Time    Glucose 64 (L) 11/19/2010 03:55 AM    Glucose (POC) 223 (H) 12/24/2012 06:26 AM    Glucose (POC) 93 11/19/2010 01:22 PM         ASSESSMENT and PLAN  Diagnoses and all orders for this visit:    1. Essential hypertension- BP elevated in the context of elevated sodium intake. No s/s of end organ damage. Stable for out pt management. Resume home regimen. Meds refilled. -     lisinopril (PRINIVIL, ZESTRIL) 10 mg tablet; Take 1 Tab by mouth daily. 2. Routine general medical examination at a health care facility- check labs. Pap at next appt   -     CBC WITH AUTOMATED DIFF  -     LIPID PANEL  -     METABOLIC PANEL, COMPREHENSIVE  -     THYROID PANEL  -     TSH 3RD GENERATION  -     VITAMIN D, 25 HYDROXY  -     HEMOGLOBIN A1C WITH EAG  -     HEPATITIS C AB    3. Prediabetes- dietary counseling given would benefit from seeing nutritionist   -     HEMOGLOBIN A1C WITH EAG  -     metFORMIN (GLUCOPHAGE) 500 mg tablet; Take 1 Tab by mouth two (2) times daily (with meals). 4. Vitamin D deficiency  -     VITAMIN D, 25 HYDROXY    5. Abnormal blood chemistry  -     HEMOGLOBIN A1C WITH EAG    6. Obesity (BMI 30-39. 9)- I have reviewed/discussed the above normal BMI with the patient. I have recommended the following interventions: dietary management education, guidance, and counseling . .      -     REFERRAL TO NUTRITION      Follow-up Disposition:  Return in about 3 months (around 5/21/2018) for Physical with PAP - 30 minute appointment. Medication risks/benefits/costs/interactions/alternatives discussed with patient. Edmundo He  was given an after visit summary which includes diagnoses, current medications, & vitals. she expressed understanding with the diagnosis and plan.

## 2018-02-21 NOTE — PROGRESS NOTES
Chief Complaint   Patient presents with   [facility] Establish Care     1. Have you been to the ER, urgent care clinic since your last visit? Hospitalized since your last visit? No    2. Have you seen or consulted any other health care providers outside of the [address] [place] [address] since your last visit? Include any pap smears or colon screening.  Yes Where: [facility] Reason for visit: Sleep Apnea/Chiropractor-shooting pain down leg, neck     Diabetes

## 2018-02-23 LAB
25(OH)D3+25(OH)D2 SERPL-MCNC: 42.9 NG/ML (ref 30–100)
ALBUMIN SERPL-MCNC: 5.1 G/DL (ref 3.6–4.8)
ALBUMIN/GLOB SERPL: 2.2 {RATIO} (ref 1.2–2.2)
ALP SERPL-CCNC: 88 IU/L (ref 39–117)
ALT SERPL-CCNC: 17 IU/L (ref 0–32)
AST SERPL-CCNC: 19 IU/L (ref 0–40)
BASOPHILS # BLD AUTO: 0 X10E3/UL (ref 0–0.2)
BASOPHILS NFR BLD AUTO: 1 %
BILIRUB SERPL-MCNC: 0.5 MG/DL (ref 0–1.2)
BUN SERPL-MCNC: 13 MG/DL (ref 8–27)
BUN/CREAT SERPL: 18 (ref 12–28)
CALCIUM SERPL-MCNC: 10.5 MG/DL (ref 8.7–10.3)
CHLORIDE SERPL-SCNC: 102 MMOL/L (ref 96–106)
CHOLEST SERPL-MCNC: 175 MG/DL (ref 100–199)
CO2 SERPL-SCNC: 25 MMOL/L (ref 18–29)
CREAT SERPL-MCNC: 0.72 MG/DL (ref 0.57–1)
EOSINOPHIL # BLD AUTO: 0.2 X10E3/UL (ref 0–0.4)
EOSINOPHIL NFR BLD AUTO: 3 %
ERYTHROCYTE [DISTWIDTH] IN BLOOD BY AUTOMATED COUNT: 14.5 % (ref 12.3–15.4)
EST. AVERAGE GLUCOSE BLD GHB EST-MCNC: 143 MG/DL
FT4I SERPL CALC-MCNC: 1.5 (ref 1.2–4.9)
GFR SERPLBLD CREATININE-BSD FMLA CKD-EPI: 105 ML/MIN/{1.73_M2}
GFR SERPLBLD CREATININE-BSD FMLA CKD-EPI: 91 ML/MIN/{1.73_M2}
GLOBULIN SER CALC-MCNC: 2.3 G/L (ref 1.5–4.5)
GLUCOSE SERPL-MCNC: 109 MG/DL (ref 65–99)
HBA1C MFR BLD: 6.6 % (ref 4.8–5.6)
HCT VFR BLD AUTO: 39.8 % (ref 34–46.6)
HCV AB S/CO SERPL IA: <0.1 S/CO RATIO (ref 0–0.9)
HDLC SERPL-MCNC: 48 MG/DL
HGB BLD-MCNC: 13.1 G/DL (ref 11.1–15.9)
IMM GRANULOCYTES # BLD: 0 X10E3/UL (ref 0–0.1)
IMM GRANULOCYTES NFR BLD: 0 %
LDLC SERPL CALC-MCNC: 107 MG/DL (ref 0–99)
LYMPHOCYTES # BLD AUTO: 1.8 X10E3/UL (ref 0.7–3.1)
LYMPHOCYTES NFR BLD AUTO: 32 %
MCH RBC QN AUTO: 26.6 PG (ref 26.6–33)
MCHC RBC AUTO-ENTMCNC: 32.9 G/DL (ref 31.5–35.7)
MCV RBC AUTO: 81 FL (ref 79–97)
MONOCYTES # BLD AUTO: 0.4 X10E3/UL (ref 0.1–0.9)
MONOCYTES NFR BLD AUTO: 7 %
NEUTROPHILS # BLD AUTO: 3.3 X10E3/UL (ref 1.4–7)
NEUTROPHILS NFR BLD AUTO: 57 %
PLATELET # BLD AUTO: 269 X10E3/UL (ref 150–379)
POTASSIUM SERPL-SCNC: 4.7 MMOL/L (ref 3.5–5.2)
PROT SERPL-MCNC: 7.4 G/DL (ref 6–8.5)
RBC # BLD AUTO: 4.92 X10E6/UL (ref 3.77–5.28)
SODIUM SERPL-SCNC: 142 MMOL/L (ref 134–144)
T3RU NFR SERPL: 25 % (ref 24–39)
T4 SERPL-MCNC: 6.1 UG/DL (ref 4.5–12)
TRIGL SERPL-MCNC: 98 MG/DL (ref 0–149)
TSH SERPL DL<=0.005 MIU/L-ACNC: 0.53 UIU/ML (ref 0.45–4.5)
VLDLC SERPL CALC-MCNC: 20 MG/DL (ref 5–40)
WBC # BLD AUTO: 5.7 X10E3/UL (ref 3.4–10.8)

## 2018-02-27 NOTE — PROGRESS NOTES
Hi Ms. Tony,  It was a pleasure to meet you at your recent appointment. As you have seen via my chart your labs show that your A1c, diabetes testing, is now in the diabetic range. It is important that you take your medication daily, Metformin. And work diligently to lose weight via diet changes and exercise. Please see the nutritionist as you were referred to. Let me know if you need any additional help reaching your health goals. Lab Review  Your labs were clinically normal/ stable. The remainder of your labs were normal .  Some labs that may have been tested and their explanation are:  Your electrolytes, kidney & liver function (Metabolic Panel)   Anemia, blood cells (CBC)  Thyroid (TSH + T4, T3)  Hormones (prolactin, vitamin D )   Pregnancy (Beta HCG)    Diabetes (Hemoglobin A1c)   Lipid Panel (Cholesterol, HDL \"good\", LDL \"bad\")   Hepatitis C AB (Hepatitis C Screening)    Do not hesitate to contact the office if you have any questions or concerns before your next appointment.    Kind regards,   Dr. Sanchez Gone

## 2018-04-20 ENCOUNTER — TELEPHONE (OUTPATIENT)
Dept: CARDIOLOGY CLINIC | Age: 63
End: 2018-04-20

## 2018-04-20 PROBLEM — E66.01 SEVERE OBESITY (BMI 35.0-39.9) WITH COMORBIDITY (HCC): Status: ACTIVE | Noted: 2018-04-20

## 2018-04-20 NOTE — TELEPHONE ENCOUNTER
Pt needs clearance for oral surgery by Dr Steffanie Montano. Hx of MV ring prosthesis and last seen 2/2018. Pt takes ASA 81 mg.

## 2018-05-22 ENCOUNTER — OFFICE VISIT (OUTPATIENT)
Dept: INTERNAL MEDICINE CLINIC | Age: 63
End: 2018-05-22

## 2018-05-22 ENCOUNTER — HOSPITAL ENCOUNTER (OUTPATIENT)
Dept: LAB | Age: 63
Discharge: HOME | End: 2018-05-22
Payer: MEDICARE

## 2018-05-22 VITALS
RESPIRATION RATE: 16 BRPM | TEMPERATURE: 98.2 F | HEART RATE: 88 BPM | WEIGHT: 221.6 LBS | BODY MASS INDEX: 37.83 KG/M2 | DIASTOLIC BLOOD PRESSURE: 76 MMHG | SYSTOLIC BLOOD PRESSURE: 148 MMHG | OXYGEN SATURATION: 97 % | HEIGHT: 64 IN

## 2018-05-22 DIAGNOSIS — Z00.00 WELL WOMAN EXAM (NO GYNECOLOGICAL EXAM): Primary | ICD-10-CM

## 2018-05-22 DIAGNOSIS — E78.49 OTHER HYPERLIPIDEMIA: Chronic | ICD-10-CM

## 2018-05-22 DIAGNOSIS — I10 ESSENTIAL HYPERTENSION: Chronic | ICD-10-CM

## 2018-05-22 DIAGNOSIS — Z23 NEED FOR TDAP VACCINATION: ICD-10-CM

## 2018-05-22 DIAGNOSIS — M54.10 RADICULOPATHY AFFECTING UPPER EXTREMITY: ICD-10-CM

## 2018-05-22 PROCEDURE — 87624 HPV HI-RISK TYP POOLED RSLT: CPT | Performed by: INTERNAL MEDICINE

## 2018-05-22 PROCEDURE — 88175 CYTOPATH C/V AUTO FLUID REDO: CPT | Performed by: INTERNAL MEDICINE

## 2018-05-22 NOTE — PROGRESS NOTES
HISTORY OF PRESENT ILLNESS  Rosa Isela Snow is a 59 y.o. female for CPE   HPI     Health Maintenance   Topic Date Due    DTaP/Tdap/Td series (1 - Tdap) 03/20/1977    PAP AKA CERVICAL CYTOLOGY  01/26/2014    ZOSTER VACCINE AGE 60>  01/20/2016    BREAST CANCER SCRN MAMMOGRAM  07/07/2017    MEDICARE YEARLY EXAM  03/14/2018    Influenza Age 9 to Adult  08/01/2018    COLONOSCOPY  01/11/2026    Hepatitis C Screening  Completed       Cardiovascular Review  The patient has hypertension, hyperlipidemia and valve disease followed by Cardiology. Seen in last 3 months. Body mass index is 37.93 kg/(m^2). Diet and Lifestyle: generally follows a low sodium diet, nonsmoker, has decreased carbs   Home BP Monitoring: well controlled at home. Pertinent ROS: taking medications as instructed- her bisoprolol has been stopped by cardiology, no medication side effects noted, no TIA's, no chest pain on exertion, no dyspnea on exertion, no swelling of ankles. Cervical Radiculopathy  Reports few months of paroxysmal LUE tingling and numbness. Denies neck pain, chest pain, DOWLING. Review of Systems   Musculoskeletal: Positive for back pain. Patient Active Problem List    Diagnosis Date Noted    Severe obesity (BMI 35.0-39. 9) with comorbidity ([facility] [address] 04/20/2018    Mitral valve regurgitation 11/12/2010    Hypertension 11/12/2010    Hyperlipidemia 11/12/2010    Palpitations 11/12/2010       Current Outpatient Prescriptions   Medication Sig Dispense Refill    magnesium oxide (MAG-OX) 400 mg tablet Take 400 mg by mouth daily.  OTHER Indications: Tumeric      lisinopril (PRINIVIL, ZESTRIL) 10 mg tablet Take 1 Tab by mouth daily. 90 Tab 3    metFORMIN (GLUCOPHAGE) 500 mg tablet Take 1 Tab by mouth two (2) times daily (with meals). 30 Tab 0    ascorbic acid, vitamin C, (VITAMIN C) 1,000 mg tablet Take  by mouth daily.  OTHER daily.  Indications: vitamin for hair and nails      atorvastatin (LIPITOR) 40 mg tablet Take 1 Tab by mouth every evening. 90 Tab 1    multivitamin (ONE A DAY) tablet Take 1 Tab by mouth daily.  omega-3 fatty acids-vitamin e (FISH OIL) 1,000 mg cap Take 1 Cap by mouth daily.  magnesium oxide (MAG-OX) 400 mg tablet Take 400 mg by mouth daily.  aspirin delayed-release 81 mg tablet Take  by mouth daily.  CALCIUM CITRATE/VITAMIN D2 (CALCIUM CITRATE WITH D PO) Take 1 tablet by mouth daily. Allergies   Allergen Reactions    Other Plant, Animal, Environmental Other (comments)     hayfever      Visit Vitals    /74 (BP 1 Location: Right arm, BP Patient Position: Sitting)    Pulse 88    Temp 98.2 °F (36.8 °C) (Oral)    Resp 16    Ht 5' 4.09\" (1.628 m)    Wt 221 lb 9.6 oz (100.5 kg)    SpO2 97%    BMI 37.93 kg/m2       Physical Exam   Constitutional: She is oriented to person, place, and time. She appears well-developed and well-nourished. HENT:   Right Ear: External ear normal.   Left Ear: External ear normal.   Mouth/Throat: Oropharynx is clear and moist. No oropharyngeal exudate. Eyes: Conjunctivae are normal. No scleral icterus. Neck: Normal range of motion. Neck supple. No thyromegaly present. Cardiovascular: Normal rate, regular rhythm and normal heart sounds. Exam reveals no gallop and no friction rub. No murmur heard. Pulmonary/Chest: Effort normal and breath sounds normal. No respiratory distress. She has no wheezes. She has no rales. She exhibits no tenderness. Abdominal: Soft. Bowel sounds are normal. She exhibits no distension. There is no tenderness. There is no rebound and no guarding. Genitourinary: Rectal exam shows guaiac negative stool. Cervix exhibits no motion tenderness, no discharge and no friability. Right adnexum displays no mass, no tenderness and no fullness. Left adnexum displays no mass, no tenderness and no fullness. No erythema or tenderness in the vagina. Vaginal discharge found.    Genitourinary Comments: Nurse chaperone present   [employer]      Musculoskeletal: Normal range of motion. She exhibits no edema or tenderness. Neurological: She is alert and oriented to person, place, and time. Result Information   Status Provider Status      Final result (Collected: 8/6/2012  1:45 PM) Open    Result Narrative   **Final Report**       ICD Codes / Adm. Diagnosis: 782. 0  75 / Disturbance of skin sensation    ExaminationDaralene Bridges MIN [facility]  - [id number] - Aug  6 2012  1:48PM  Accession No:  [id number]  Reason:  782.0      REPORT:  AP, lateral, bilateral oblique, and odontoid radiographs of the cervical   spine were performed. There is cervical spine straightening with a slight   kyphosis. There is multilevel disc space narrowing, most significantly at   C6-C7. There is anterior osteophyte formation. The neural foramina are   patent.  A fracture is not seen.        IMPRESSION: Multilevel degenerative disc disease.            Signing/Reading Doctor: Julia Jaffe ([id number])    Approved: Julia Jaffe ([id number])  08/06/2012                               Lab Results  Component Value Date/Time   WBC 5.7 02/22/2018 11:33 AM   HGB 13.1 02/22/2018 11:33 AM   Hemoglobin (POC) 12.9 12/24/2012 06:26 AM   HCT 39.8 02/22/2018 11:33 AM   Hematocrit (POC) 38 12/24/2012 06:26 AM   PLATELET 255 41/79/4888 11:33 AM   MCV 81 02/22/2018 11:33 AM     Lab Results  Component Value Date/Time   Hemoglobin A1c 6.6 (H) 02/22/2018 11:33 AM   Hemoglobin A1c 6.5 (H) 01/12/2015 12:44 PM   Hemoglobin A1c 6.1 (H) 11/12/2010 10:12 AM   Hemoglobin A1c, External 6.7 01/28/2016   Glucose 109 (H) 02/22/2018 11:33 AM   Glucose (POC) 223 (H) 12/24/2012 06:26 AM   Glucose (POC) 93 11/19/2010 01:22 PM   LDL, calculated 107 (H) 02/22/2018 11:33 AM   Creatinine (POC) 0.7 12/24/2012 06:26 AM   Creatinine 0.72 02/22/2018 11:33 AM      Lab Results  Component Value Date/Time   Cholesterol, total 175 02/22/2018 11:33 AM   HDL Cholesterol 48 02/22/2018 11:33 AM LDL, calculated 107 (H) 02/22/2018 11:33 AM   LDL-C, External 90 01/28/2016   Triglyceride 98 02/22/2018 11:33 AM   CHOL/HDL Ratio 3.0 07/06/2009 08:34 AM     Lab Results  Component Value Date/Time   ALT (SGPT) 17 02/22/2018 11:33 AM   AST (SGOT) 19 02/22/2018 11:33 AM   Alk. phosphatase 88 02/22/2018 11:33 AM   Bilirubin, direct 0.14 06/22/2017 09:25 AM   Bilirubin, total 0.5 02/22/2018 11:33 AM   Albumin 5.1 (H) 02/22/2018 11:33 AM   Protein, total 7.4 02/22/2018 11:33 AM   INR 1.2 (H) 11/17/2010 05:00 PM   Prothrombin time 12.5 (H) 11/17/2010 05:00 PM   PLATELET 364 59/15/8752 11:33 AM       Lab Results  Component Value Date/Time   GFR est non-AA 91 02/22/2018 11:33 AM   GFRNA, POC >60 12/24/2012 06:26 AM   GFR est  02/22/2018 11:33 AM   GFRAA, POC >60 12/24/2012 06:26 AM   Creatinine 0.72 02/22/2018 11:33 AM   Creatinine (POC) 0.7 12/24/2012 06:26 AM   BUN 13 02/22/2018 11:33 AM   BUN (POC) 12 12/24/2012 06:26 AM   Sodium 142 02/22/2018 11:33 AM   Sodium (POC) 144 12/24/2012 06:26 AM   Potassium 4.7 02/22/2018 11:33 AM   Potassium (POC) 4.5 12/24/2012 06:26 AM   Chloride 102 02/22/2018 11:33 AM   Chloride (POC) 110 (H) 12/24/2012 06:26 AM   CO2 25 02/22/2018 11:33 AM   Magnesium 2.0 11/19/2010 03:55 AM     Lab Results  Component Value Date/Time   TSH 0.527 02/22/2018 11:33 AM   T3 Uptake 25 02/22/2018 11:33 AM   T4, Total 6.1 02/22/2018 11:33 AM      Lab Results   Component Value Date/Time    Glucose 109 (H) 02/22/2018 11:33 AM    Glucose (POC) 223 (H) 12/24/2012 06:26 AM    Glucose (POC) 93 11/19/2010 01:22 PM           ASSESSMENT and PLAN  Diagnoses and all orders for this visit:    1. Well woman exam (no gynecological exam)- Health Maintenance reviewed and addressed as ordered below     -     PAP IG, APTIMA HPV AND RFX 16/18,45 ([id number])  -     TETANUS, DIPHTHERIA TOXOIDS AND ACELLULAR PERTUSSIS VACCINE (TDAP), IN INDIVIDS. >=7, IM  -     [place] IMMUNIZ ADMIN,1 SINGLE/COMB VAC/TOXOID    2.  Need for Tdap vaccination- received without complication  -     TETANUS, DIPHTHERIA TOXOIDS AND ACELLULAR PERTUSSIS VACCINE (TDAP), IN INDIVIDS. >=7, IM  -     [place] IMMUNIZ ADMIN,1 SINGLE/COMB VAC/TOXOID    3. Essential hypertension- management per cardiology. Medication recently discontinued. Reports BP lower at home. Continue to monitor at home. I have reviewed/discussed the above normal BMI with the patient. I have recommended the following interventions: dietary management education, guidance, and counseling . .        4. Other hyperlipidemia- recent LDL close to goal. Anticipate improvement with weight loss and diet change. Check labs prior to next appt. 5. Radiculopathy- known DJD cervical. Recent cardiac evaluation wnl. Referral to orthopedics given   Red flags to warrant ER or earlier clinical evaluation reviewed. Follow-up Disposition:  Return in about 4 months (around 9/22/2018) for Hypertension, Follow-up. Medication risks/benefits/costs/interactions/alternatives discussed with patient. Haja Schmidt  was given an after visit summary which includes diagnoses, current medications, & vitals. she expressed understanding with the diagnosis and plan.

## 2018-05-22 NOTE — PATIENT INSTRUCTIONS
It was a pleasure to see you! As discussed:    Congratulations on your weight loss and positive lifestyle changes!!! Continue reducing your carbs  -Decrease carbohydrates to 150-200g/ day. Well Visit, Women 48 to 72: Care Instructions  Your Care Instructions  Physical exams can help you stay healthy. Your doctor has checked your overall health and may have suggested ways to take good care of yourself. He or she also may have recommended tests. At home, you can help prevent illness with healthy eating, regular exercise, and other steps. Follow-up care is a key part of your treatment and safety. Be sure to make and go to all appointments, and call your doctor if you are having problems. It's also a good idea to know your test results and keep a list of the medicines you take. How can you care for yourself at home? · Reach and stay at a healthy weight. This will lower your risk for many problems, such as obesity, diabetes, heart disease, and high blood pressure. · Get at least 30 minutes of exercise on most days of the week. Walking is a good choice. You also may want to do other activities, such as running, swimming, cycling, or playing tennis or team sports. · Do not smoke. Smoking can make health problems worse. If you need help quitting, talk to your doctor about stop-smoking programs and medicines. These can increase your chances of quitting for good. · Protect your skin from too much sun. When you're outdoors from 10 a.m. to 4 p.m., stay in the shade or cover up with clothing and a hat with a wide brim. Wear sunglasses that block UV rays. Even when it's cloudy, put broad-spectrum sunscreen (SPF 30 or higher) on any exposed skin. · See a dentist one or two times a year for checkups and to have your teeth cleaned. · Wear a seat belt in the car. · Limit alcohol to 1 drink a day. Too much alcohol can cause health problems. Follow your doctor's advice about when to have certain tests.  These tests can spot problems early. · Cholesterol. Your doctor will tell you how often to have this done based on your age, family history, or other things that can increase your risk for heart attack and stroke. · Blood pressure. Have your blood pressure checked during a routine doctor visit. Your doctor will tell you how often to check your blood pressure based on your age, your blood pressure results, and other factors. · Mammogram. Ask your doctor how often you should have a mammogram, which is an X-ray of your breasts. A mammogram can spot breast cancer before it can be felt and when it is easiest to treat. · Pap test and pelvic exam. Ask your doctor how often you should have a Pap test. You may not need to have a Pap test as often as you used to. · Vision. Have your eyes checked every year or two or as often as your doctor suggests. Some experts recommend that you have yearly exams for glaucoma and other age-related eye problems starting at age 48. · Hearing. Tell your doctor if you notice any change in your hearing. You can have tests to find out how well you hear. · Diabetes. Ask your doctor whether you should have tests for diabetes. · Colon cancer. You should begin tests for colon cancer at age 48. You may have one of several tests. Your doctor will tell you how often to have tests based on your age and risk. Risks include whether you already had a precancerous polyp removed from your colon or whether your parents, sisters and brothers, or children have had colon cancer. · Thyroid disease. Talk to your doctor about whether to have your thyroid checked as part of a regular physical exam. Women have an increased chance of a thyroid problem. · Osteoporosis. You should begin tests for bone density at age 72. If you are younger than 72, ask your doctor whether you have factors that may increase your risk for this disease. You may want to have this test before age 72. · Heart attack and stroke risk.  At least every 4 to 6 years, you should have your risk for heart attack and stroke assessed. Your doctor uses factors such as your age, blood pressure, cholesterol, and whether you smoke or have diabetes to show what your risk for a heart attack or stroke is over the next 10 years. When should you call for help? Watch closely for changes in your health, and be sure to contact your doctor if you have any problems or symptoms that concern you. Where can you learn more? Go to http://andrea-christo.info/. Enter V574 in the search box to learn more about \"Well Visit, Women 50 to 72: Care Instructions. \"  Current as of: May 12, 2017  Content Version: 11.4  © 5165-2039 [employer]. Care instructions adapted under license by [employer] (which disclaims liability or warranty for this information). If you have questions about a medical condition or this instruction, always ask your healthcare professional. [address] any warranty or liability for your use of this information.

## 2018-05-22 NOTE — MR AVS SNAPSHOT
[address] [facility] [address] [facility] 
[phone] Patient: Adonis Santoro MRN: [id number] OGW:[id number] Visit Information Date & Time Provider Department Dept. Phone Encounter #  
 5/22/2018  3:30 PM Trinidad Davey MD [address] [id number] Internal Medicine [phone] [id number] Follow-up Instructions Return in about 4 months (around 9/22/2018) for Hypertension, Follow-up. Your Appointments 8/10/2018  4:00 PM  
ESTABLISHED PATIENT with Edna Gonzalez MD  
[facility] (LENO SCHEDULING) Appt Note: 6 mo fu appt  [address] [address]  
[phone]  
  
   
 [address]  [address] [address] Upcoming Health Maintenance Date Due DTaP/Tdap/Td series (1 - Tdap) 3/20/1977 ZOSTER VACCINE AGE 60> 1/20/2016 BREAST CANCER SCRN MAMMOGRAM 8/1/2018* MEDICARE YEARLY EXAM 5/1/2024* Influenza Age 5 to Adult 8/1/2018 PAP AKA CERVICAL CYTOLOGY 5/22/2021 COLONOSCOPY 1/11/2026 *Topic was postponed. The date shown is not the original due date. Allergies as of 5/22/2018  Review Complete On: 5/22/2018 By: Trinidad Davey MD  
  
 Severity Noted Reaction Type Reactions Other Plant, Animal, Environmental  02/21/2018    Other (comments)  
 hayfever Current Immunizations  Never Reviewed Name Date Tdap  Incomplete Not reviewed this visit You Were Diagnosed With   
  
 Codes Comments Well woman exam (no gynecological exam)    -  Primary ICD-10-CM: Z00.00 ICD-9-CM: V70.0 Need for Tdap vaccination     ICD-10-CM: Z57 ICD-9-CM: V06.1 Essential hypertension     ICD-10-CM: I10 
ICD-9-CM: 401.9 Other hyperlipidemia     ICD-10-CM: E78.4 ICD-9-CM: 272.4 Vitals BP Pulse Temp Resp Height(growth percentile) Weight(growth percentile)  150/74 (BP 1 Location: Right arm, BP Patient Position: Sitting) 88 98.2 °F (36.8 °C) (Oral) 16 5' 4.09\" (1.628 m) 221 lb 9.6 oz (100.5 kg) SpO2 BMI OB Status Smoking Status 97% 37.93 kg/m2 Hysterectomy Never Smoker Vitals History BMI and BSA Data Body Mass Index Body Surface Area  
 37.93 kg/m 2 2.13 m 2 Preferred Pharmacy Pharmacy Name Phone [id number], [address] [phone] Your Updated Medication List  
  
   
This list is accurate as of 5/22/18  4:21 PM.  Always use your most recent med list.  
  
  
  
  
 aspirin delayed-release 81 mg tablet Take  by mouth daily. atorvastatin 40 mg tablet Commonly known as:  LIPITOR Take 1 Tab by mouth every evening. CALCIUM CITRATE WITH D PO Take 1 tablet by mouth daily. FISH OIL 1,000 mg Cap Generic drug:  omega-3 fatty acids-vitamin e Take 1 Cap by mouth daily. lisinopril 10 mg tablet Commonly known as:  Braden Hails Take 1 Tab by mouth daily. * magnesium oxide 400 mg tablet Commonly known as:  MAG-OX Take 400 mg by mouth daily. * magnesium oxide 400 mg tablet Commonly known as:  MAG-OX Take 400 mg by mouth daily. metFORMIN 500 mg tablet Commonly known as:  GLUCOPHAGE Take 1 Tab by mouth two (2) times daily (with meals). multivitamin tablet Commonly known as:  ONE A DAY Take 1 Tab by mouth daily. OTHER  
daily. Indications: vitamin for hair and nails OTHER Indications: Tumeric VITAMIN C 1,000 mg tablet Generic drug:  ascorbic acid (vitamin C) Take  by mouth daily. * Notice: This list has 2 medication(s) that are the same as other medications prescribed for you. Read the directions carefully, and ask your doctor or other care provider to review them with you. We Performed the Following PAP IG, APTIMA HPV AND RFX 16/18,45 ([id number]) [[id number] Custom] MO IMMUNIZ ADMIN,1 SINGLE/COMB VAC/TOXOID [id number] CPT(R)] TETANUS, DIPHTHERIA TOXOIDS AND ACELLULAR PERTUSSIS VACCINE (TDAP), IN INDIVIDS. >=7, IM F9536254 CPT(R)] Follow-up Instructions Return in about 4 months (around 9/22/2018) for Hypertension, Follow-up. To-Do List   
 07/16/2018 4:30 PM  
  Appointment with [facility] NEELA 1 at [facility] Mammography ([phone]) Shower or bathe using soap and water. Do not use deodorant, powder, perfumes, or lotion the day of your exam.  If your prior mammograms were not performed at [facility] 6 please bring films with you or forward prior images 2 days before your procedure. Check in at registration 15min before your appointment time unless you were instructed to do otherwise. A script is not necessary, but if you have one, please bring it on the day of the mammogram or have it faxed to the department. [facility] [phone] [facility]  [phone] [facility] [address]  [phone] [facility] [phone] [facility] [address] [place] [address] [phone] Patient Instructions It was a pleasure to see you! As discussed: 
 
Congratulations on your weight loss and positive lifestyle changes!!! Continue reducing your carbs 
-Decrease carbohydrates to 150-200g/ day. Well Visit, Women 48 to 72: Care Instructions Your Care Instructions Physical exams can help you stay healthy. Your doctor has checked your overall health and may have suggested ways to take good care of yourself. He or she also may have recommended tests. At home, you can help prevent illness with healthy eating, regular exercise, and other steps. Follow-up care is a key part of your treatment and safety. Be sure to make and go to all appointments, and call your doctor if you are having problems. It's also a good idea to know your test results and keep a list of the medicines you take. How can you care for yourself at home? · Reach and stay at a healthy weight.  This will lower your risk for many problems, such as obesity, diabetes, heart disease, and high blood pressure. · Get at least 30 minutes of exercise on most days of the week. Walking is a good choice. You also may want to do other activities, such as running, swimming, cycling, or playing tennis or team sports. · Do not smoke. Smoking can make health problems worse. If you need help quitting, talk to your doctor about stop-smoking programs and medicines. These can increase your chances of quitting for good. · Protect your skin from too much sun. When you're outdoors from 10 a.m. to 4 p.m., stay in the shade or cover up with clothing and a hat with a wide brim. Wear sunglasses that block UV rays. Even when it's cloudy, put broad-spectrum sunscreen (SPF 30 or higher) on any exposed skin. · See a dentist one or two times a year for checkups and to have your teeth cleaned. · Wear a seat belt in the car. · Limit alcohol to 1 drink a day. Too much alcohol can cause health problems. Follow your doctor's advice about when to have certain tests. These tests can spot problems early. · Cholesterol. Your doctor will tell you how often to have this done based on your age, family history, or other things that can increase your risk for heart attack and stroke. · Blood pressure. Have your blood pressure checked during a routine doctor visit. Your doctor will tell you how often to check your blood pressure based on your age, your blood pressure results, and other factors. · Mammogram. Ask your doctor how often you should have a mammogram, which is an X-ray of your breasts. A mammogram can spot breast cancer before it can be felt and when it is easiest to treat. · Pap test and pelvic exam. Ask your doctor how often you should have a Pap test. You may not need to have a Pap test as often as you used to. · Vision. Have your eyes checked every year or two or as often as your doctor suggests.  Some experts recommend that you have yearly exams for glaucoma and other age-related eye problems starting at age 48. · Hearing. Tell your doctor if you notice any change in your hearing. You can have tests to find out how well you hear. · Diabetes. Ask your doctor whether you should have tests for diabetes. · Colon cancer. You should begin tests for colon cancer at age 48. You may have one of several tests. Your doctor will tell you how often to have tests based on your age and risk. Risks include whether you already had a precancerous polyp removed from your colon or whether your parents, sisters and brothers, or children have had colon cancer. · Thyroid disease. Talk to your doctor about whether to have your thyroid checked as part of a regular physical exam. Women have an increased chance of a thyroid problem. · Osteoporosis. You should begin tests for bone density at age 72. If you are younger than 72, ask your doctor whether you have factors that may increase your risk for this disease. You may want to have this test before age 72. · Heart attack and stroke risk. At least every 4 to 6 years, you should have your risk for heart attack and stroke assessed. Your doctor uses factors such as your age, blood pressure, cholesterol, and whether you smoke or have diabetes to show what your risk for a heart attack or stroke is over the next 10 years. When should you call for help? Watch closely for changes in your health, and be sure to contact your doctor if you have any problems or symptoms that concern you. Where can you learn more? Go to http://andrea-christo.info/. Enter M440 in the search box to learn more about \"Well Visit, Women 50 to 72: Care Instructions. \" Current as of: May 12, 2017 Content Version: 11.4 © 4416-5143 [employer]. Care instructions adapted under license by [employer] (which disclaims liability or warranty for this information).  If you have questions about a medical condition or this instruction, always ask your healthcare professional. [address] any warranty or liability for your use of this information. Introducing [facility]! Dear Shikha Alston: Thank you for requesting a Xpress[employer] account. Our records indicate that you already have an active Xpresso account. You can access your account anytime at [email or web address] Did you know that you can access your hospital and ER discharge instructions at any time in Xpresso? You can also review all of your test results from your hospital stay or ER visit. Additional Information If you have questions, please visit the Frequently Asked Questions section of the Xpresso website at [email or web address] Remember, Xpresso is NOT to be used for urgent needs. For medical emergencies, dial 911. Now available from your iPhone and Android! Please provide this summary of care documentation to your next provider. Your primary care clinician is listed as Sary Stearns. If you have any questions after today's visit, please call [phone].

## 2018-05-22 NOTE — PROGRESS NOTES
Chief Complaint   Patient presents with    Complete Physical     1. Have you been to the ER, urgent care clinic since your last visit? Hospitalized since your last visit? No    2. Have you seen or consulted any other health care providers outside of the [address] [place] [address] since your last visit? Include any pap smears or colon screening. Yes Where: chiropractor Reason for visit: back and neck pain/Pulomonologist-sleep apnea    Mammogram schedule for 7/2018    complains of numbness left arm, frequently. Neck pain, seen by chiropractor, provided exercise    Patient received Tdap in office today. Administered Boostrix 0.5 ml in left deltoid, IM.  Pt tolerated well

## 2018-05-29 NOTE — PROGRESS NOTES
Thank you for using [employer]. Your Pap Smear is normal.   We will repeat your pap smear at age 73. Do not hesitate to contact the office if you have any questions or concerns before your next appointment.    Kind regards,   Dr. Marie Guallpa

## 2018-07-16 ENCOUNTER — HOSPITAL ENCOUNTER (OUTPATIENT)
Dept: MAMMOGRAPHY | Age: 63
Discharge: HOME | End: 2018-07-16
Attending: INTERNAL MEDICINE
Payer: COMMERCIAL

## 2018-07-16 DIAGNOSIS — Z12.31 VISIT FOR SCREENING MAMMOGRAM: ICD-10-CM

## 2018-07-16 PROCEDURE — 77067 SCR MAMMO BI INCL CAD: CPT

## 2018-08-04 DIAGNOSIS — R73.03 PREDIABETES: ICD-10-CM

## 2018-08-04 RX ORDER — METFORMIN HYDROCHLORIDE 500 MG/1
TABLET ORAL
Qty: 180 TAB | Refills: 1 | Status: SHIPPED | COMMUNITY
Start: 2018-08-04 | End: 2019-06-03 | Stop reason: SDUPTHER

## 2018-09-10 ENCOUNTER — TELEPHONE (OUTPATIENT)
Dept: CARDIOLOGY CLINIC | Age: 63
End: 2018-09-10

## 2018-09-10 NOTE — TELEPHONE ENCOUNTER
Drew Pan would like to know if you received a form she faxed over (form for provider's signature to confirm date of service (2/2/18) for pt). Phone: [phone]  Reference number [id number]: you can talk to anyone who answers the phone.

## 2019-03-15 DIAGNOSIS — E78.49 OTHER HYPERLIPIDEMIA: Primary | Chronic | ICD-10-CM

## 2019-03-15 DIAGNOSIS — E66.01 SEVERE OBESITY (BMI 35.0-39.9) WITH COMORBIDITY (HCC): ICD-10-CM

## 2019-03-15 DIAGNOSIS — I10 ESSENTIAL HYPERTENSION: ICD-10-CM

## 2019-03-15 RX ORDER — LISINOPRIL 10 MG/1
TABLET ORAL
Qty: 90 TAB | Refills: 1 | OUTPATIENT
Start: 2019-03-15

## 2019-03-18 ENCOUNTER — TELEPHONE (OUTPATIENT)
Dept: INTERNAL MEDICINE CLINIC | Age: 64
End: 2019-03-18

## 2019-03-18 RX ORDER — LISINOPRIL 10 MG/1
10 TABLET ORAL DAILY
Qty: 30 TAB | Refills: 0 | Status: SHIPPED | OUTPATIENT
Start: 2019-03-18 | End: 2019-04-30 | Stop reason: SDUPTHER

## 2019-03-18 NOTE — TELEPHONE ENCOUNTER
Informed patient lab work due before appointment. Faxed lab order to [place] location @ [phone], confirmation received.

## 2019-04-30 ENCOUNTER — TELEPHONE (OUTPATIENT)
Dept: INTERNAL MEDICINE CLINIC | Age: 64
End: 2019-04-30

## 2019-04-30 DIAGNOSIS — I10 ESSENTIAL HYPERTENSION: ICD-10-CM

## 2019-04-30 RX ORDER — LISINOPRIL 10 MG/1
10 TABLET ORAL DAILY
Qty: 20 TAB | Refills: 0 | Status: SHIPPED | OUTPATIENT
Start: 2019-04-30 | End: 2019-05-20 | Stop reason: SDUPTHER

## 2019-04-30 NOTE — TELEPHONE ENCOUNTER
Kiera Joyce from Dr Jaqueline Sanchez office requesting our office to refill Lisinopril. Patient has not been seen in a year. Informed patient need to be seen before any further refills. Kiera Joyce stated will refill 20 tabs until schedule visit.

## 2019-05-20 ENCOUNTER — OFFICE VISIT (OUTPATIENT)
Dept: INTERNAL MEDICINE CLINIC | Age: 64
End: 2019-05-20

## 2019-05-20 VITALS
OXYGEN SATURATION: 94 % | TEMPERATURE: 98.1 F | SYSTOLIC BLOOD PRESSURE: 130 MMHG | BODY MASS INDEX: 39.09 KG/M2 | RESPIRATION RATE: 16 BRPM | HEIGHT: 64 IN | HEART RATE: 52 BPM | DIASTOLIC BLOOD PRESSURE: 76 MMHG | WEIGHT: 229 LBS

## 2019-05-20 DIAGNOSIS — R73.03 PREDIABETES: ICD-10-CM

## 2019-05-20 DIAGNOSIS — E78.49 OTHER HYPERLIPIDEMIA: ICD-10-CM

## 2019-05-20 DIAGNOSIS — E66.01 SEVERE OBESITY (BMI 35.0-39.9) WITH COMORBIDITY (HCC): ICD-10-CM

## 2019-05-20 DIAGNOSIS — I10 ESSENTIAL HYPERTENSION: ICD-10-CM

## 2019-05-20 DIAGNOSIS — Z00.00 WELL WOMAN EXAM (NO GYNECOLOGICAL EXAM): Primary | ICD-10-CM

## 2019-05-20 RX ORDER — LISINOPRIL 10 MG/1
10 TABLET ORAL DAILY
Qty: 90 TAB | Refills: 1 | Status: SHIPPED | OUTPATIENT
Start: 2019-05-20 | End: 2022-02-25 | Stop reason: DRUGHIGH

## 2019-05-20 RX ORDER — BISOPROLOL FUMARATE AND HYDROCHLOROTHIAZIDE 5; 6.25 MG/1; MG/1
1 TABLET ORAL DAILY
COMMUNITY
End: 2019-06-05 | Stop reason: ALTCHOICE

## 2019-05-20 NOTE — PROGRESS NOTES
Identified pt with two pt identifiers(name and ). Reviewed record in preparation for visit and have obtained necessary documentation. All patient medications has been reviewed. Chief Complaint Patient presents with  Diabetes  Medication Evaluation Metformin Health Maintenance Due Topic  Shingrix Vaccine Age 50> (1 of 2)  BREAST CANCER SCRN MAMMOGRAM   
 
 
Vitals:  
 19 1313 BP: 130/76 Pulse: (!) 52 Resp: 16 Temp: 98.1 °F (36.7 °C) TempSrc: Oral  
SpO2: 94% Weight: 229 lb (103.9 kg) Height: 5' 4\" (1.626 m) PainSc:   0 - No pain Coordination of Care Questionnaire:  
1) Have you been to an emergency room, urgent care, or hospitalized since your last visit?   no    
 
2. Have seen or consulted any other health care provider since your last visit? NO 
 
3) Do you have an Advanced Directive/ Living Will in place? YES If yes, do we have a copy on file YES If no, would you like information NO Patient is accompanied by self I have received verbal consent from Ivis Brar to discuss any/all medical information while they are present in the room.

## 2019-05-20 NOTE — PROGRESS NOTES
HISTORY OF PRESENT ILLNESS Praveen Ma is a 62 y.o. female. HPI Health Maintenance Topic Date Due  Shingrix Vaccine Age 50> (1 of 2) 03/20/2006  BREAST CANCER SCRN MAMMOGRAM  07/16/2019  MEDICARE YEARLY EXAM  05/01/2024 (Originally 3/14/2018)  Influenza Age 5 to Adult  08/01/2019  PAP AKA CERVICAL CYTOLOGY  05/22/2021  COLONOSCOPY  01/11/2026  
 DTaP/Tdap/Td series (2 - Td) 05/22/2028  Hepatitis C Screening  Completed  Pneumococcal 0-64 years  Aged Out  
 
Cardiovascular Review: 
The patient has hypertension, hyperlipidemia, obesity Body mass index is 39.31 kg/m². and mitral valve regurgitation. Diet and Lifestyle: exercises sporadically, nonsmoker, alcohol intake <7 drinks/ week Home BP Monitoring: is well controlled at home, ranging 130's/30's. Pertinent ROS: no TIA's, no chest pain on exertion, no dyspnea on exertion, no swelling of ankles, taking medications as instructed\",no medication side effects noted. Statin was stopped by cardiology, SHx: Father placed in long term care; Retired early ROSper HPI Patient Active Problem List  
 Diagnosis Date Noted  Severe obesity (BMI 35.0-39. 9) with comorbidity ([facility] [address] 04/20/2018  Mitral valve regurgitation 11/12/2010  Hypertension 11/12/2010  Hyperlipidemia 11/12/2010  Palpitations 11/12/2010 Current Outpatient Medications Medication Sig Dispense Refill  bisoprolol-hydroCHLOROthiazide (ZIAC) 5-6.25 mg per tablet Take 1 Tab by mouth daily.  lisinopril (PRINIVIL, ZESTRIL) 10 mg tablet Take 1 Tab by mouth daily. 20 Tab 0  
 metFORMIN (GLUCOPHAGE) 500 mg tablet TAKE 1 TAB BY MOUTH TWO (2) TIMES DAILY (WITH MEALS). 180 Tab 1  
 OTHER Indications: Tumeric  ascorbic acid, vitamin C, (VITAMIN C) 1,000 mg tablet Take  by mouth daily.  OTHER daily. Indications: vitamin for hair and nails  multivitamin (ONE A DAY) tablet Take 1 Tab by mouth daily.  omega-3 fatty acids-vitamin e (FISH OIL) 1,000 mg cap Take 1 Cap by mouth daily.  magnesium oxide (MAG-OX) 400 mg tablet Take 250 mg by mouth daily.  aspirin delayed-release 81 mg tablet Take  by mouth daily.  atorvastatin (LIPITOR) 40 mg tablet Take 1 Tab by mouth every evening. 90 Tab 1  
 CALCIUM CITRATE/VITAMIN D2 (CALCIUM CITRATE WITH D PO) Take 1 tablet by mouth daily. Allergies Allergen Reactions  Other Plant, Animal, Environmental Other (comments)  
  hayfever Visit Vitals /76 (BP 1 Location: Left arm, BP Patient Position: Sitting) Pulse (!) 52 Temp 98.1 °F (36.7 °C) (Oral) Resp 16 Ht 5' 4\" (1.626 m) Wt 229 lb (103.9 kg) SpO2 94% BMI 39.31 kg/m² Physical Exam  
Constitutional: She is oriented to person, place, and time. She appears well-developed and well-nourished. HENT:  
Right Ear: External ear normal.  
Left Ear: External ear normal.  
Mouth/Throat: Oropharynx is clear and moist. No oropharyngeal exudate. Eyes: Conjunctivae are normal. No scleral icterus. Neck: Normal range of motion. Neck supple. No thyromegaly present. Cardiovascular: Normal rate, regular rhythm and normal heart sounds. Exam reveals no gallop and no friction rub. No murmur heard. Pulmonary/Chest: Effort normal and breath sounds normal. No respiratory distress. She has no wheezes. She has no rales. She exhibits no tenderness. Abdominal: Soft. Bowel sounds are normal. She exhibits no distension. There is no tenderness. There is no rebound and no guarding. Musculoskeletal: Normal range of motion. She exhibits no edema (BLE) or tenderness. Neurological: She is alert and oriented to person, place, and time. Psychiatric: She has a normal mood and affect. ASSESSMENT and PLAN Diagnoses and all orders for this visit: 
 
1. Well woman exam (no gynecological exam)- Health Maintenance reviewed and addressed as ordered. 2. Essential hypertension- well controlled. Counseled on diet and exercise. Continue current regimen. -     lisinopril (PRINIVIL, ZESTRIL) 10 mg tablet; Take 1 Tab by mouth daily. 3. Other hyperlipidemia- check lipids 4. Severe obesity (BMI 35.0-39. 9) with comorbidity ([address]- weight gain after skilled nursing. Working on diet optimization. 5. Prediabetes- check a1c Follow-up and Dispositions · Return in about 4 months (around 9/20/2019) for Establish with new primary care. Medication risks/benefits/costs/interactions/alternatives discussed with patient. Ivis Brar  was given an after visit summary which includes diagnoses, current medications, & vitals. she expressed understanding with the diagnosis and plan.

## 2019-05-20 NOTE — PATIENT INSTRUCTIONS
It was a pleasure to see you! As discussed: It has been a pleasure caring for you! For your next appointment:  
Schedule with Dr. Rinku Nath [facility] [phone] Hamilton Muñoz MD 
Address: [facility], [facility] [address] [facility], [address] [facility] Phone: [phone]

## 2019-05-23 LAB
ALBUMIN SERPL-MCNC: 5 G/DL (ref 3.6–4.8)
ALBUMIN/GLOB SERPL: 2 {RATIO} (ref 1.2–2.2)
ALP SERPL-CCNC: 97 IU/L (ref 39–117)
ALT SERPL-CCNC: 16 IU/L (ref 0–32)
AST SERPL-CCNC: 18 IU/L (ref 0–40)
BASOPHILS # BLD AUTO: 0 X10E3/UL (ref 0–0.2)
BASOPHILS NFR BLD AUTO: 1 %
BILIRUB SERPL-MCNC: 0.5 MG/DL (ref 0–1.2)
BUN SERPL-MCNC: 16 MG/DL (ref 8–27)
BUN/CREAT SERPL: 20 (ref 12–28)
CALCIUM SERPL-MCNC: 10.9 MG/DL (ref 8.7–10.3)
CHLORIDE SERPL-SCNC: 99 MMOL/L (ref 96–106)
CHOLEST SERPL-MCNC: 249 MG/DL (ref 100–199)
CO2 SERPL-SCNC: 26 MMOL/L (ref 20–29)
CREAT SERPL-MCNC: 0.82 MG/DL (ref 0.57–1)
EOSINOPHIL # BLD AUTO: 0.2 X10E3/UL (ref 0–0.4)
EOSINOPHIL NFR BLD AUTO: 4 %
ERYTHROCYTE [DISTWIDTH] IN BLOOD BY AUTOMATED COUNT: 14.3 % (ref 12.3–15.4)
EST. AVERAGE GLUCOSE BLD GHB EST-MCNC: 148 MG/DL
GLOBULIN SER CALC-MCNC: 2.5 G/DL (ref 1.5–4.5)
GLUCOSE SERPL-MCNC: 118 MG/DL (ref 65–99)
HBA1C MFR BLD: 6.8 % (ref 4.8–5.6)
HCT VFR BLD AUTO: 41.6 % (ref 34–46.6)
HDLC SERPL-MCNC: 52 MG/DL
HGB BLD-MCNC: 13.6 G/DL (ref 11.1–15.9)
IMM GRANULOCYTES # BLD AUTO: 0 X10E3/UL (ref 0–0.1)
IMM GRANULOCYTES NFR BLD AUTO: 1 %
LDLC SERPL CALC-MCNC: 170 MG/DL (ref 0–99)
LYMPHOCYTES # BLD AUTO: 1.8 X10E3/UL (ref 0.7–3.1)
LYMPHOCYTES NFR BLD AUTO: 30 %
MCH RBC QN AUTO: 27.4 PG (ref 26.6–33)
MCHC RBC AUTO-ENTMCNC: 32.7 G/DL (ref 31.5–35.7)
MCV RBC AUTO: 84 FL (ref 79–97)
MONOCYTES # BLD AUTO: 0.4 X10E3/UL (ref 0.1–0.9)
MONOCYTES NFR BLD AUTO: 6 %
NEUTROPHILS # BLD AUTO: 3.5 X10E3/UL (ref 1.4–7)
NEUTROPHILS NFR BLD AUTO: 58 %
PLATELET # BLD AUTO: 255 X10E3/UL (ref 150–450)
POTASSIUM SERPL-SCNC: 5.1 MMOL/L (ref 3.5–5.2)
PROT SERPL-MCNC: 7.5 G/DL (ref 6–8.5)
RBC # BLD AUTO: 4.97 X10E6/UL (ref 3.77–5.28)
SODIUM SERPL-SCNC: 140 MMOL/L (ref 134–144)
TRIGL SERPL-MCNC: 133 MG/DL (ref 0–149)
VLDLC SERPL CALC-MCNC: 27 MG/DL (ref 5–40)
WBC # BLD AUTO: 5.9 X10E3/UL (ref 3.4–10.8)

## 2019-06-01 NOTE — TELEPHONE ENCOUNTER
Please let Divine Alonso know her cholesterol has significantly worsened. Was she fasting? If so I recommend she start a cholesterol lowering medication. Please order Atorvastatin 20mg po qHS, 90 tabs 1 refill. She should establish with a new PCP and have labs checked in 4 months.

## 2019-06-03 ENCOUNTER — TELEPHONE (OUTPATIENT)
Dept: CARDIOLOGY CLINIC | Age: 64
End: 2019-06-03

## 2019-06-03 DIAGNOSIS — R73.03 PREDIABETES: ICD-10-CM

## 2019-06-03 RX ORDER — ATORVASTATIN CALCIUM 20 MG/1
20 TABLET, FILM COATED ORAL DAILY
Qty: 90 TAB | Refills: 1 | Status: SHIPPED | OUTPATIENT
Start: 2019-06-03 | End: 2021-03-26 | Stop reason: ALTCHOICE

## 2019-06-03 RX ORDER — METFORMIN HYDROCHLORIDE 500 MG/1
TABLET ORAL
Qty: 180 TAB | Refills: 1 | Status: SHIPPED | OUTPATIENT
Start: 2019-06-03 | End: 2019-07-29 | Stop reason: SDUPTHER

## 2019-06-03 NOTE — TELEPHONE ENCOUNTER
Verified patient identity with two identifiers. Spoke with patient by phone. Informed her cholesterol has significantly worsened. She was fasting for test so Dr. Endy Mann recommends she start a cholesterol lowering medication. Atorvastatin 20mg po qHS, 90 tabs 1 [facility]. She should establish with a new PCP and have labs checked in 4 months. She has an appt with Dr. Darian Gong at [facility].

## 2019-06-05 ENCOUNTER — OFFICE VISIT (OUTPATIENT)
Dept: CARDIOLOGY CLINIC | Age: 64
End: 2019-06-05

## 2019-06-05 VITALS
WEIGHT: 227.4 LBS | BODY MASS INDEX: 38.82 KG/M2 | HEIGHT: 64 IN | HEART RATE: 50 BPM | RESPIRATION RATE: 18 BRPM | DIASTOLIC BLOOD PRESSURE: 80 MMHG | SYSTOLIC BLOOD PRESSURE: 138 MMHG

## 2019-06-05 DIAGNOSIS — G47.33 OSA ON CPAP: ICD-10-CM

## 2019-06-05 DIAGNOSIS — I05.9 MITRAL VALVE DISORDER: ICD-10-CM

## 2019-06-05 DIAGNOSIS — E78.5 DYSLIPIDEMIA: ICD-10-CM

## 2019-06-05 DIAGNOSIS — Z99.89 OSA ON CPAP: ICD-10-CM

## 2019-06-05 DIAGNOSIS — I10 ESSENTIAL HYPERTENSION: Primary | ICD-10-CM

## 2019-06-05 DIAGNOSIS — E66.09 CLASS 2 OBESITY DUE TO EXCESS CALORIES WITH BODY MASS INDEX (BMI) OF 39.0 TO 39.9 IN ADULT, UNSPECIFIED WHETHER SERIOUS COMORBIDITY PRESENT: ICD-10-CM

## 2019-06-05 DIAGNOSIS — T50.905A BRADYCARDIA, DRUG INDUCED: ICD-10-CM

## 2019-06-05 DIAGNOSIS — R00.1 BRADYCARDIA, DRUG INDUCED: ICD-10-CM

## 2019-06-05 RX ORDER — CHLORTHALIDONE 25 MG/1
12.5 TABLET ORAL DAILY
Qty: 15 TAB | Refills: 0 | Status: SHIPPED | OUTPATIENT
Start: 2019-06-05 | End: 2019-07-02 | Stop reason: SDUPTHER

## 2019-06-05 NOTE — PROGRESS NOTES
Visit Vitals  /80 (BP 1 Location: Left arm)   Pulse (!) 50   Resp 18   Ht 5' 4\" (1.626 m)   Wt 227 lb 6.4 oz (103.1 kg)   BMI 39.03 kg/m²       Patient is here for follow up. No complaints. Needs refills.

## 2019-06-05 NOTE — PROGRESS NOTES
Debbie Ring [facility]  [address]  [place], [address] [facility]    Office [phone]  Fax [phone]        Pippa Randolph is a 62 y.o. female who is followed outpatient by Dr. Keron Walters and was last seen 2/2/18. Patient is here today for medication refills. Assessment  Encounter Diagnoses   Name Primary?  Essential hypertension Yes    Bradycardia, drug induced     Mitral valve disorder     Dyslipidemia     BILLY on CPAP     Class 2 obesity due to excess calories with body mass index (BMI) of 39.0 to 39.9 in adult, unspecified whether serious comorbidity present      Recommendations:  Mitral valve repair  - appears to be doing well, no chest discomfort or SOB   - last echo 2/2018 with nml fx; repeat echo again next year    Dyslipidemia  - Lipids elevated off statin therapy; per pt atorvastatin 20mg/d started by PCP ~2 weeks ago    Hypertension  - Blood pressure mildly elevated and pt bradycardic (HR 50 in office); I am going to discontinue her Ziac and start chlorthalidone 25mg/d  - BMP prior to next f/u visit     BILLY  - She is wearing her CPAP machine. Diabetes mellitus  - last HGB A1C was 6.8%, goal should be 6% or below. Obesity - discussed improved diet and regular exercise. Follow-up and Dispositions    · Return in about 1 month (around 7/3/2019), or if symptoms worsen or fail to improve. Patient understands the plan. All questions were answered to the patient's satisfaction.     Medication Side Effects and Warnings were discussed with patient: yes  Patient Labs were reviewed and or requested:  yes  Patient Past Records were reviewed and or requested: yes     I appreciate the opportunity to be involved in patient's care. Please do not hesitate to contact us with questions or concerns. Subjective:  Pt states she is doing well today and without cardiac complaint.   Has been negligent in her self care due to increased stress at home; her father was recently transitioned to a nursing home and she has had increased care giver burden. Finally saw PCP Dr. Baljeet Doe 2 weeks ago. Had noted to be off her statin therapy for a while prior to this; it was restarted. Never stopped Raquel Anchors as advised at last visit by Dr. Josh Valderrama.  Has still been taking. Patient denies any exertional chest pain, dyspnea, palpitations, syncope, orthopnea, edema, or paroxysmal nocturnal dyspnea. Denies dizziness or lightheadedness. Cardiac testing  1. Echo  2/2/18- EF 65%, no WMAs. Wall thickness mildly increased. Prosthesis MV w/nml fx.    4/20/16- EF 60-65%, MV annular ring prosthesis exhibit normal function, TR mild  1/12/15- EF 55-60%, TR mild/mod, Pulm HTN mild ,Hx of TRR robotic assisted MVR using 29mm ATS adjustable ring There was mild regurgitation. 10/14/11: LVH, EF 55-60%     2. Loop  1/27/15 - SB 45-48 (only base line recorded)    3 . Cardiac catheterization   (10/14/10) : No significant CAD with severe MR  History TRR robotic assisted MVR using 29 mm ATS adjustable ring model 735AF-29     4. Cholesterol profile   1/12/15: , HDL 58, ,    1/27/16: , HDL 57, LDL 90,   1/12/17- , HDL 49, LDL 90,   6/22/17- , HDL 52, LDL 85,      EKG 6/5/19 - Sinus Bradycardia (HR 50)    Past Medical History:   Diagnosis Date    Diabetes ([address]     Hypertension     Mitral valve disorders(424.0)     Other ill-defined conditions(799.89)     mitral valve regurg    Sleep apnea with use of continuous positive airway pressure (CPAP)     Unspecified adverse effect of anesthesia     difficulty waking        Current Outpatient Medications   Medication Sig Dispense Refill    chlorthalidone (HYGROTEN) 25 mg tablet Take 0.5 Tabs by mouth daily. Stop Ziac (bisoprolol-hydrochlorothiazide) when starting this medication. 15 Tab 0    atorvastatin (LIPITOR) 20 mg tablet Take 1 Tab by mouth daily.  At bedtime 90 Tab 1    metFORMIN (GLUCOPHAGE) 500 mg tablet TAKE 1 TAB BY MOUTH TWO (2) TIMES DAILY (WITH MEALS). 180 Tab 1    lisinopril (PRINIVIL, ZESTRIL) 10 mg tablet Take 1 Tab by mouth daily. 90 Tab 1    OTHER Indications: Tumeric      ascorbic acid, vitamin C, (VITAMIN C) 1,000 mg tablet Take  by mouth daily.  OTHER daily. Indications: vitamin for hair and nails      multivitamin (ONE A DAY) tablet Take 1 Tab by mouth daily.  omega-3 fatty acids-vitamin e (FISH OIL) 1,000 mg cap Take 1 Cap by mouth daily.  magnesium oxide (MAG-OX) 400 mg tablet Take 250 mg by mouth daily.  aspirin delayed-release 81 mg tablet Take  by mouth daily. Allergies   Allergen Reactions    Other Plant, Animal, Environmental Other (comments)     hayfever          Review of Systems  Constitutional: Negative for fever, chills, malaise/fatigue and diaphoresis. Respiratory: Negative for cough, hemoptysis, sputum production, shortness of breath and wheezing. Cardiovascular: Negative for chest pain, palpitations, orthopnea, claudication, leg swelling and PND. Gastrointestinal: Negative for heartburn, nausea, vomiting, blood in stool and melena. Genitourinary: Negative for dysuria and flank pain. Neurological: Negative for focal weakness, seizures, loss of consciousness, weakness and headaches. Endo/Heme/Allergies: Negative for abnormal bleeding. Psychiatric/Behavioral: Negative for memory loss. Physical Exam    Visit Vitals  /80 (BP 1 Location: Left arm)   Pulse (!) 50   Resp 18   Ht 5' 4\" (1.626 m)   Wt 227 lb 6.4 oz (103.1 kg)   BMI 39.03 kg/m²     Wt Readings from Last 3 Encounters:   06/05/19 227 lb 6.4 oz (103.1 kg)   05/20/19 229 lb (103.9 kg)   05/22/18 221 lb 9.6 oz (100.5 kg)      General - well developed well nourished, obese  Neck - no JVD  Cardiac - normal S1, S2, no murmurs, rubs or gallops.  No clicks  Vascular -  radials and pedal pulses equal bilateral  Lungs - clear to auscultation bilaterals, no rales, wheezing or rhonchi  Abd - soft nontender, non-distended, +BS  Extremities - no edema, warm, well perfused  Neuro - nonfocal  Psych - normal mood and affect    Labs  Lab Results   Component Value Date/Time    Sodium 140 05/22/2019 08:30 AM    Sodium 142 02/22/2018 11:33 AM    Sodium 135 (L) 11/19/2010 03:55 AM    Sodium 145 11/18/2010 03:50 AM    Sodium 141 11/17/2010 05:00 PM    Potassium 5.1 05/22/2019 08:30 AM    Chloride 99 05/22/2019 08:30 AM    Chloride 102 02/22/2018 11:33 AM    Chloride 99 11/19/2010 03:55 AM    Chloride 107 11/18/2010 03:50 AM    Chloride 109 (H) 11/17/2010 05:00 PM    CO2 26 05/22/2019 08:30 AM    CO2 25 02/22/2018 11:33 AM    CO2 26 11/19/2010 03:55 AM    CO2 27 11/18/2010 03:50 AM    CO2 25 11/17/2010 05:00 PM    Anion gap 10 11/19/2010 03:55 AM    Anion gap 11 11/18/2010 03:50 AM    Anion gap 7 11/17/2010 05:00 PM    Anion gap 10 11/12/2010 10:12 AM    Anion gap 8 07/06/2009 08:34 AM    Glucose 118 (H) 05/22/2019 08:30 AM    Glucose 109 (H) 02/22/2018 11:33 AM    Glucose 64 (L) 11/19/2010 03:55 AM    Glucose 85 11/18/2010 03:50 AM    Glucose 109 (H) 11/17/2010 08:30 PM    BUN 16 05/22/2019 08:30 AM    BUN 13 02/22/2018 11:33 AM    BUN 10 11/19/2010 03:55 AM    BUN 13 11/18/2010 03:50 AM    BUN 15 11/17/2010 05:00 PM    Creatinine 0.82 05/22/2019 08:30 AM    Creatinine 0.72 02/22/2018 11:33 AM    Creatinine 1.0 11/19/2010 03:55 AM    Creatinine 0.8 11/18/2010 03:50 AM    Creatinine 1.2 11/17/2010 05:00 PM    BUN/Creatinine ratio 20 05/22/2019 08:30 AM    BUN/Creatinine ratio 18 02/22/2018 11:33 AM    BUN/Creatinine ratio 10 (L) 11/19/2010 03:55 AM    BUN/Creatinine ratio 16 11/18/2010 03:50 AM    BUN/Creatinine ratio 13 11/17/2010 05:00 PM    GFR est AA 88 05/22/2019 08:30 AM    GFR est  02/22/2018 11:33 AM    GFR est AA >60 11/19/2010 03:55 AM    GFR est AA >60 11/18/2010 03:50 AM    GFR est AA >60 11/17/2010 05:00 PM    GFR est non-AA 76 05/22/2019 08:30 AM    GFR est non-AA 91 02/22/2018 11:33 AM    GFR est non-AA >60 11/19/2010 03:55 AM    GFR est non-AA >60 11/18/2010 03:50 AM    GFR est non-AA 50 (L) 11/17/2010 05:00 PM    Calcium 10.9 (H) 05/22/2019 08:30 AM    Calcium 10.5 (H) 02/22/2018 11:33 AM    Calcium 9.8 11/19/2010 03:55 AM    Calcium 8.9 11/18/2010 03:50 AM    Calcium 8.8 11/17/2010 05:00 PM     Lab Results   Component Value Date/Time    Cholesterol, total 249 (H) 05/22/2019 08:30 AM    Cholesterol, total 175 02/22/2018 11:33 AM    Cholesterol, total 171 06/22/2017 09:25 AM    HDL Cholesterol 52 05/22/2019 08:30 AM    HDL Cholesterol 48 02/22/2018 11:33 AM    HDL Cholesterol 52 06/22/2017 09:25 AM    LDL, calculated 170 (H) 05/22/2019 08:30 AM    LDL, calculated 107 (H) 02/22/2018 11:33 AM    LDL, calculated 85 06/22/2017 09:25 AM    Triglyceride 133 05/22/2019 08:30 AM    Triglyceride 98 02/22/2018 11:33 AM    Triglyceride 168 (H) 06/22/2017 09:25 AM    CHOL/HDL Ratio 3.0 07/06/2009 08:34 AM    CHOL/HDL Ratio 4.7 02/16/2009 09:30 AM     Lab Results   Component Value Date/Time    Hemoglobin A1c 6.8 (H) 05/22/2019 08:30 AM    Hemoglobin A1c, External 6.7 01/28/2016     Jeanna Muñiz, ANP

## 2019-07-03 RX ORDER — CHLORTHALIDONE 25 MG/1
TABLET ORAL
Qty: 15 TAB | Refills: 0 | Status: SHIPPED | OUTPATIENT
Start: 2019-07-03 | End: 2019-07-11 | Stop reason: SDUPTHER

## 2019-07-09 LAB
BUN SERPL-MCNC: 15 MG/DL (ref 8–27)
BUN/CREAT SERPL: 21 (ref 12–28)
CALCIUM SERPL-MCNC: 10.7 MG/DL (ref 8.7–10.3)
CHLORIDE SERPL-SCNC: 102 MMOL/L (ref 96–106)
CO2 SERPL-SCNC: 22 MMOL/L (ref 20–29)
CREAT SERPL-MCNC: 0.73 MG/DL (ref 0.57–1)
GLUCOSE SERPL-MCNC: 121 MG/DL (ref 65–99)
POTASSIUM SERPL-SCNC: 4.6 MMOL/L (ref 3.5–5.2)
SODIUM SERPL-SCNC: 143 MMOL/L (ref 134–144)

## 2019-07-11 ENCOUNTER — OFFICE VISIT (OUTPATIENT)
Dept: CARDIOLOGY CLINIC | Age: 64
End: 2019-07-11

## 2019-07-11 VITALS
SYSTOLIC BLOOD PRESSURE: 138 MMHG | HEIGHT: 64 IN | HEART RATE: 56 BPM | WEIGHT: 229 LBS | DIASTOLIC BLOOD PRESSURE: 80 MMHG | OXYGEN SATURATION: 98 % | BODY MASS INDEX: 39.09 KG/M2

## 2019-07-11 DIAGNOSIS — Z99.89 OSA ON CPAP: ICD-10-CM

## 2019-07-11 DIAGNOSIS — Z98.890 H/O MITRAL VALVE REPAIR: ICD-10-CM

## 2019-07-11 DIAGNOSIS — E66.09 CLASS 2 OBESITY DUE TO EXCESS CALORIES WITH BODY MASS INDEX (BMI) OF 39.0 TO 39.9 IN ADULT, UNSPECIFIED WHETHER SERIOUS COMORBIDITY PRESENT: ICD-10-CM

## 2019-07-11 DIAGNOSIS — I10 ESSENTIAL HYPERTENSION: Primary | ICD-10-CM

## 2019-07-11 DIAGNOSIS — E78.5 DYSLIPIDEMIA: ICD-10-CM

## 2019-07-11 DIAGNOSIS — G47.33 OSA ON CPAP: ICD-10-CM

## 2019-07-11 RX ORDER — CHLORTHALIDONE 25 MG/1
12.5 TABLET ORAL DAILY
Qty: 15 TAB | Refills: 0 | Status: SHIPPED | OUTPATIENT
Start: 2019-07-11 | End: 2019-07-29 | Stop reason: SDUPTHER

## 2019-07-11 NOTE — PATIENT INSTRUCTIONS
Start chlorthalidone 12.5mg daily (1/2 tab); get repeat blood work in 2 weeks. I will call you with results. Keep a blood pressure log at home - check 4-5x per week. Check at least an hour after your meds; you should be sitting with arm at heart height. At rest for at least 5 min prior. Keep a log for review.

## 2019-07-11 NOTE — PROGRESS NOTES
Patient has no cardiac complaints today   Follow up from being taken off medication. Patient says that she did not take 1/2 table of chorthalidone 25 mg but was taking the whole tablet.    She is out of the chorthalidone now     Visit Vitals  /80 (BP 1 Location: Left arm, BP Patient Position: Sitting)   Pulse (!) 56   Ht 5' 4\" (1.626 m)   Wt 229 lb (103.9 kg)   SpO2 98%   BMI 39.31 kg/m²

## 2019-07-11 NOTE — PROGRESS NOTES
KELY Obregon  [address] [place] [facility], [address] [facility]    Office [phone]  Fax [phone]        Eliana Talamantes is a 62 y.o. female who is followed outpatient by Dr. Lincoln Cooks and was last seen by me on 6/5/19. Patient is here today for follow-up of blood pressure. Assessment  Encounter Diagnoses   Name Primary?  H/O mitral valve repair     Dyslipidemia     Essential hypertension Yes    BILLY on CPAP     Class 2 obesity due to excess calories with body mass index (BMI) of 39.0 to 39.9 in adult, unspecified whether serious comorbidity present      Recommendations:  Hypertension  - Blood pressure mildly elevated; again start chlorthalidone 12.5mg/d  - advised to keep home BP log with repeat lab work in 2 weeks    Mitral valve repair  - doing well, no chest discomfort or SOB   - last echo 2/2018 with nml fx; repeat echo again next year    Dyslipidemia  - Lipids elevated off statin therapy; per pt atorvastatin 20mg/d started by PCP ~6weeks ago  - recheck per PCP    BILLY  - She is wearing her CPAP machine. Obesity - discussed improved diet and regular exercise. Follow-up and Dispositions    · Return in about 6 months (around 1/11/2020), or if symptoms worsen or fail to improve. Patient understands the plan. All questions were answered to the patient's satisfaction.     Medication Side Effects and Warnings were discussed with patient: yes  Patient Labs were reviewed and or requested:  yes  Patient Past Records were reviewed and or requested: yes     I appreciate the opportunity to be involved in patient's care. Please do not hesitate to contact us with questions or concerns. Subjective:  Pt states she is doing well today and without cardiac complaint. Started chlorthalidone as advised but was taking a full tablet daily without issue; ran out after 15 days and did not request refill. Got labwork after being off med for >10days.       Patient denies any exertional chest pain, dyspnea, palpitations, syncope, orthopnea, edema, or paroxysmal nocturnal dyspnea. Denies dizziness or lightheadedness. Cardiac testing  1. Echo  2/2/18- EF 65%, no WMAs. Wall thickness mildly increased. Prosthesis MV w/nml fx.    4/20/16- EF 60-65%, MV annular ring prosthesis exhibit normal function, TR mild  1/12/15- EF 55-60%, TR mild/mod, Pulm HTN mild ,Hx of TRR robotic assisted MVR using 29mm ATS adjustable ring There was mild regurgitation. 10/14/11: LVH, EF 55-60%     2. Loop  1/27/15 - SB 45-48 (only base line recorded)    3 . Cardiac catheterization   (10/14/10) : No significant CAD with severe MR  History TRR robotic assisted MVR using 29 mm ATS adjustable ring model 735AF-29     4. Cholesterol profile   1/12/15: , HDL 58, ,    1/27/16: , HDL 57, LDL 90,   1/12/17- , HDL 49, LDL 90,   6/22/17- , HDL 52, LDL 85,      EKG 6/5/19 - Sinus Bradycardia (HR 50)    Past Medical History:   Diagnosis Date    Diabetes [address] [address]     Hypertension     Mitral valve disorders(424.0)     Other ill-defined conditions(799.89)     mitral valve regurg    Sleep apnea with use of continuous positive airway pressure (CPAP)     Unspecified adverse effect of anesthesia     difficulty waking        Current Outpatient Medications   Medication Sig Dispense Refill    cpap machine kit by Does Not Apply route.  chlorthalidone (HYGROTEN) 25 mg tablet Take 0.5 Tabs by mouth daily. 15 Tab 0    atorvastatin (LIPITOR) 20 mg tablet Take 1 Tab by mouth daily. At bedtime 90 Tab 1    metFORMIN (GLUCOPHAGE) 500 mg tablet TAKE 1 TAB BY MOUTH TWO (2) TIMES DAILY (WITH MEALS). 180 Tab 1    lisinopril (PRINIVIL, ZESTRIL) 10 mg tablet Take 1 Tab by mouth daily. 90 Tab 1    OTHER Indications: Tumeric      ascorbic acid, vitamin C, (VITAMIN C) 1,000 mg tablet Take  by mouth daily.  multivitamin (ONE A DAY) tablet Take 1 Tab by mouth daily.  omega-3 fatty acids-vitamin e (FISH OIL) 1,000 mg cap Take 1 Cap by mouth daily.  magnesium oxide (MAG-OX) 400 mg tablet Take 250 mg by mouth daily.  aspirin delayed-release 81 mg tablet Take  by mouth every other day.  OTHER daily. Indications: vitamin for hair and nails         Allergies   Allergen Reactions    Other Plant, Animal, Environmental Other (comments)     hayfever          Review of Systems  Constitutional: Negative for fever, chills, malaise/fatigue and diaphoresis. Respiratory: Negative for cough, hemoptysis, sputum production, shortness of breath and wheezing. Cardiovascular: Negative for chest pain, palpitations, orthopnea, claudication, leg swelling and PND. Gastrointestinal: Negative for heartburn, nausea, vomiting, blood in stool and melena. Genitourinary: Negative for dysuria and flank pain. Neurological: Negative for focal weakness, seizures, loss of consciousness, weakness and headaches. Endo/Heme/Allergies: Negative for abnormal bleeding. Psychiatric/Behavioral: Negative for memory loss. Physical Exam    Visit Vitals  /80 (BP 1 Location: Left arm, BP Patient Position: Sitting)   Pulse (!) 56   Ht 5' 4\" (1.626 m)   Wt 229 lb (103.9 kg)   SpO2 98%   BMI 39.31 kg/m²     Wt Readings from Last 3 Encounters:   07/11/19 229 lb (103.9 kg)   06/05/19 227 lb 6.4 oz (103.1 kg)   05/20/19 229 lb (103.9 kg)      General - well developed well nourished, obese  Neck - no JVD  Cardiac - normal S1, S2, no murmurs, rubs or gallops.  No clicks  Vascular -  radials and pedal pulses equal bilateral  Lungs - clear to auscultation bilaterals, no rales, wheezing or rhonchi  Abd - soft nontender, non-distended, +BS  Extremities - no edema, warm, well perfused  Neuro - nonfocal  Psych - normal mood and affect    Labs  Lab Results   Component Value Date/Time    Sodium 143 07/08/2019 10:09 AM    Sodium 140 05/22/2019 08:30 AM    Sodium 142 02/22/2018 11:33 AM    Sodium 135 (L) 11/19/2010 03:55 AM    Sodium 145 11/18/2010 03:50 AM    Potassium 4.6 07/08/2019 10:09 AM    Chloride 102 07/08/2019 10:09 AM    Chloride 99 05/22/2019 08:30 AM    Chloride 102 02/22/2018 11:33 AM    Chloride 99 11/19/2010 03:55 AM    Chloride 107 11/18/2010 03:50 AM    CO2 22 07/08/2019 10:09 AM    CO2 26 05/22/2019 08:30 AM    CO2 25 02/22/2018 11:33 AM    CO2 26 11/19/2010 03:55 AM    CO2 27 11/18/2010 03:50 AM    Anion gap 10 11/19/2010 03:55 AM    Anion gap 11 11/18/2010 03:50 AM    Anion gap 7 11/17/2010 05:00 PM    Anion gap 10 11/12/2010 10:12 AM    Anion gap 8 07/06/2009 08:34 AM    Glucose 121 (H) 07/08/2019 10:09 AM    Glucose 118 (H) 05/22/2019 08:30 AM    Glucose 109 (H) 02/22/2018 11:33 AM    Glucose 64 (L) 11/19/2010 03:55 AM    Glucose 85 11/18/2010 03:50 AM    BUN 15 07/08/2019 10:09 AM    BUN 16 05/22/2019 08:30 AM    BUN 13 02/22/2018 11:33 AM    BUN 10 11/19/2010 03:55 AM    BUN 13 11/18/2010 03:50 AM    Creatinine 0.73 07/08/2019 10:09 AM    Creatinine 0.82 05/22/2019 08:30 AM    Creatinine 0.72 02/22/2018 11:33 AM    Creatinine 1.0 11/19/2010 03:55 AM    Creatinine 0.8 11/18/2010 03:50 AM    BUN/Creatinine ratio 21 07/08/2019 10:09 AM    BUN/Creatinine ratio 20 05/22/2019 08:30 AM    BUN/Creatinine ratio 18 02/22/2018 11:33 AM    BUN/Creatinine ratio 10 (L) 11/19/2010 03:55 AM    BUN/Creatinine ratio 16 11/18/2010 03:50 AM    GFR est  07/08/2019 10:09 AM    GFR est AA 88 05/22/2019 08:30 AM    GFR est  02/22/2018 11:33 AM    GFR est AA >60 11/19/2010 03:55 AM    GFR est AA >60 11/18/2010 03:50 AM    GFR est non-AA 88 07/08/2019 10:09 AM    GFR est non-AA 76 05/22/2019 08:30 AM    GFR est non-AA 91 02/22/2018 11:33 AM    GFR est non-AA >60 11/19/2010 03:55 AM    GFR est non-AA >60 11/18/2010 03:50 AM    Calcium 10.7 (H) 07/08/2019 10:09 AM    Calcium 10.9 (H) 05/22/2019 08:30 AM    Calcium 10.5 (H) 02/22/2018 11:33 AM    Calcium 9.8 11/19/2010 03:55 AM    Calcium 8.9 11/18/2010 03:50 AM     Lab Results   Component Value Date/Time    Cholesterol, total 249 (H) 05/22/2019 08:30 AM    Cholesterol, total 175 02/22/2018 11:33 AM    Cholesterol, total 171 06/22/2017 09:25 AM    HDL Cholesterol 52 05/22/2019 08:30 AM    HDL Cholesterol 48 02/22/2018 11:33 AM    HDL Cholesterol 52 06/22/2017 09:25 AM    LDL, calculated 170 (H) 05/22/2019 08:30 AM    LDL, calculated 107 (H) 02/22/2018 11:33 AM    LDL, calculated 85 06/22/2017 09:25 AM    Triglyceride 133 05/22/2019 08:30 AM    Triglyceride 98 02/22/2018 11:33 AM    Triglyceride 168 (H) 06/22/2017 09:25 AM    CHOL/HDL Ratio 3.0 07/06/2009 08:34 AM    CHOL/HDL Ratio 4.7 02/16/2009 09:30 AM     Lab Results   Component Value Date/Time    Hemoglobin A1c 6.8 (H) 05/22/2019 08:30 AM    Hemoglobin A1c, External 6.7 01/28/2016     Anastasia Samson, ANP

## 2019-07-18 ENCOUNTER — HOSPITAL ENCOUNTER (OUTPATIENT)
Dept: MAMMOGRAPHY | Age: 64
Discharge: HOME | End: 2019-07-18
Attending: INTERNAL MEDICINE
Payer: COMMERCIAL

## 2019-07-18 DIAGNOSIS — Z12.39 BREAST SCREENING, UNSPECIFIED: ICD-10-CM

## 2019-07-18 PROCEDURE — 77067 SCR MAMMO BI INCL CAD: CPT

## 2019-07-24 NOTE — PROGRESS NOTES
Results reviewed. Results released via [employer]. Mammogram is normal.  Previous Dr Sravan Singer pt, needs to establish with new PCP.

## 2019-07-26 ENCOUNTER — TELEPHONE (OUTPATIENT)
Dept: CARDIOLOGY CLINIC | Age: 64
End: 2019-07-26

## 2019-07-26 DIAGNOSIS — R73.03 PREDIABETES: ICD-10-CM

## 2019-07-26 DIAGNOSIS — I10 ESSENTIAL HYPERTENSION: Primary | ICD-10-CM

## 2019-07-26 LAB
BUN SERPL-MCNC: 16 MG/DL (ref 8–27)
BUN/CREAT SERPL: 21 (ref 12–28)
CALCIUM SERPL-MCNC: 11.4 MG/DL (ref 8.7–10.3)
CHLORIDE SERPL-SCNC: 94 MMOL/L (ref 96–106)
CO2 SERPL-SCNC: 26 MMOL/L (ref 20–29)
CREAT SERPL-MCNC: 0.77 MG/DL (ref 0.57–1)
GLUCOSE SERPL-MCNC: 115 MG/DL (ref 65–99)
POTASSIUM SERPL-SCNC: 4.2 MMOL/L (ref 3.5–5.2)
SODIUM SERPL-SCNC: 138 MMOL/L (ref 134–144)

## 2019-07-26 NOTE — TELEPHONE ENCOUNTER
Called to review labs and f/u on BP; left VM.      Component      Latest Ref Rng & Units 7/25/2019          11:49 AM   Glucose      65 - 99 mg/dL 115 (H)   BUN      8 - 27 mg/dL 16   Creatinine      0.57 - 1.00 mg/dL 0.77   GFR est non-AA      >59 mL/min/1.73 82   GFR est AA      >59 mL/min/1.73 95   BUN/Creatinine ratio      12 - 28 21   Sodium      134 - 144 mmol/L 138   Potassium      3.5 - 5.2 mmol/L 4.2   Chloride      96 - 106 mmol/L 94 (L)   CO2      20 - 29 mmol/L 26   Calcium      8.7 - 10.3 mg/dL 11.4 (H)

## 2019-07-29 RX ORDER — METFORMIN HYDROCHLORIDE 500 MG/1
TABLET ORAL
Qty: 180 TAB | Refills: 0 | Status: SHIPPED | OUTPATIENT
Start: 2019-07-29 | End: 2021-03-29

## 2019-07-29 RX ORDER — CHLORTHALIDONE 25 MG/1
25 TABLET ORAL DAILY
Qty: 30 TAB | Refills: 0 | Status: SHIPPED | OUTPATIENT
Start: 2019-07-29 | End: 2019-08-26 | Stop reason: SDUPTHER

## 2019-07-29 NOTE — TELEPHONE ENCOUNTER
Labs stable. BP los-140s/60s-70s. Will increase chlorthalidone to 25mg/d. Switching PCP and not seeing her new provider till end of Oct.  Asked I refill Metformin. Agreed; future refills per PCP.

## 2019-08-26 RX ORDER — CHLORTHALIDONE 25 MG/1
TABLET ORAL
Qty: 30 TAB | Refills: 0 | Status: SHIPPED | OUTPATIENT
Start: 2019-08-26 | End: 2019-09-22 | Stop reason: SDUPTHER

## 2019-08-29 ENCOUNTER — TELEPHONE (OUTPATIENT)
Dept: CARDIOLOGY CLINIC | Age: 64
End: 2019-08-29

## 2019-08-29 LAB
BUN SERPL-MCNC: 18 MG/DL (ref 8–27)
BUN/CREAT SERPL: 24 (ref 12–28)
CALCIUM SERPL-MCNC: 10.8 MG/DL (ref 8.7–10.3)
CHLORIDE SERPL-SCNC: 100 MMOL/L (ref 96–106)
CO2 SERPL-SCNC: 27 MMOL/L (ref 20–29)
CREAT SERPL-MCNC: 0.75 MG/DL (ref 0.57–1)
GLUCOSE SERPL-MCNC: 128 MG/DL (ref 65–99)
POTASSIUM SERPL-SCNC: 4.9 MMOL/L (ref 3.5–5.2)
SODIUM SERPL-SCNC: 143 MMOL/L (ref 134–144)

## 2019-08-29 NOTE — TELEPHONE ENCOUNTER
BMP 8/28/19 stable; continue current meds.      Component      Latest Ref Rng & Units 8/28/2019           8:50 AM   Glucose      65 - 99 mg/dL 128 (H)   BUN      8 - 27 mg/dL 18   Creatinine      0.57 - 1.00 mg/dL 0.75   GFR est non-AA      >59 mL/min/1.73 85   GFR est AA      >59 mL/min/1.73 98   BUN/Creatinine ratio      12 - 28 24   Sodium      134 - 144 mmol/L 143   Potassium      3.5 - 5.2 mmol/L 4.9   Chloride      96 - 106 mmol/L 100   CO2      20 - 29 mmol/L 27   Calcium      8.7 - 10.3 mg/dL 10.8 (H)

## 2020-06-29 RX ORDER — CHLORTHALIDONE 25 MG/1
TABLET ORAL
Qty: 30 TAB | Refills: 0 | Status: SHIPPED | OUTPATIENT
Start: 2020-06-29 | End: 2020-07-27

## 2020-07-27 ENCOUNTER — TELEPHONE (OUTPATIENT)
Dept: CARDIOLOGY CLINIC | Age: 65
End: 2020-07-27

## 2021-02-09 ENCOUNTER — TELEPHONE (OUTPATIENT)
Dept: CARDIOLOGY CLINIC | Age: 66
End: 2021-02-09

## 2021-02-09 NOTE — TELEPHONE ENCOUNTER
Patient calling in regards to upcoming appointment on 03/26. States new insurance will not start until 03/01, but would like to know if any testing needs to be ordered for appointment.      Phone: [phone]

## 2021-03-10 ENCOUNTER — IMMUNIZATION (OUTPATIENT)
Dept: INTERNAL MEDICINE CLINIC | Age: 66
End: 2021-03-10
Payer: MEDICARE

## 2021-03-10 DIAGNOSIS — Z23 ENCOUNTER FOR IMMUNIZATION: Primary | ICD-10-CM

## 2021-03-10 PROCEDURE — 0001A COVID-19, MRNA, LNP-S, PF, 30MCG/0.3ML DOSE(PFIZER): CPT | Performed by: FAMILY MEDICINE

## 2021-03-10 PROCEDURE — 91300 COVID-19, MRNA, LNP-S, PF, 30MCG/0.3ML DOSE(PFIZER): CPT | Performed by: FAMILY MEDICINE

## 2021-03-26 ENCOUNTER — OFFICE VISIT (OUTPATIENT)
Dept: CARDIOLOGY CLINIC | Age: 66
End: 2021-03-26
Payer: MEDICARE

## 2021-03-26 VITALS
SYSTOLIC BLOOD PRESSURE: 120 MMHG | WEIGHT: 218 LBS | DIASTOLIC BLOOD PRESSURE: 70 MMHG | OXYGEN SATURATION: 99 % | HEIGHT: 64 IN | BODY MASS INDEX: 37.22 KG/M2 | HEART RATE: 64 BPM

## 2021-03-26 DIAGNOSIS — Z99.89 OSA ON CPAP: ICD-10-CM

## 2021-03-26 DIAGNOSIS — I10 ESSENTIAL HYPERTENSION: Primary | ICD-10-CM

## 2021-03-26 DIAGNOSIS — E66.09 CLASS 2 OBESITY DUE TO EXCESS CALORIES WITH BODY MASS INDEX (BMI) OF 39.0 TO 39.9 IN ADULT, UNSPECIFIED WHETHER SERIOUS COMORBIDITY PRESENT: ICD-10-CM

## 2021-03-26 DIAGNOSIS — G47.33 OSA ON CPAP: ICD-10-CM

## 2021-03-26 DIAGNOSIS — Z98.890 H/O MITRAL VALVE REPAIR: ICD-10-CM

## 2021-03-26 DIAGNOSIS — E78.5 DYSLIPIDEMIA: ICD-10-CM

## 2021-03-26 PROCEDURE — G8754 DIAS BP LESS 90: HCPCS | Performed by: SPECIALIST

## 2021-03-26 PROCEDURE — G8417 CALC BMI ABV UP PARAM F/U: HCPCS | Performed by: SPECIALIST

## 2021-03-26 PROCEDURE — G8432 DEP SCR NOT DOC, RNG: HCPCS | Performed by: SPECIALIST

## 2021-03-26 PROCEDURE — G8536 NO DOC ELDER MAL SCRN: HCPCS | Performed by: SPECIALIST

## 2021-03-26 PROCEDURE — G8400 PT W/DXA NO RESULTS DOC: HCPCS | Performed by: SPECIALIST

## 2021-03-26 PROCEDURE — 3017F COLORECTAL CA SCREEN DOC REV: CPT | Performed by: SPECIALIST

## 2021-03-26 PROCEDURE — 99214 OFFICE O/P EST MOD 30 MIN: CPT | Performed by: SPECIALIST

## 2021-03-26 PROCEDURE — 1090F PRES/ABSN URINE INCON ASSESS: CPT | Performed by: SPECIALIST

## 2021-03-26 PROCEDURE — 1101F PT FALLS ASSESS-DOCD LE1/YR: CPT | Performed by: SPECIALIST

## 2021-03-26 PROCEDURE — G8427 DOCREV CUR MEDS BY ELIG CLIN: HCPCS | Performed by: SPECIALIST

## 2021-03-26 PROCEDURE — G8752 SYS BP LESS 140: HCPCS | Performed by: SPECIALIST

## 2021-03-26 PROCEDURE — 93000 ELECTROCARDIOGRAM COMPLETE: CPT | Performed by: SPECIALIST

## 2021-03-26 RX ORDER — ROSUVASTATIN CALCIUM 20 MG/1
20 TABLET, COATED ORAL
Qty: 30 TAB | Refills: 5 | Status: SHIPPED | OUTPATIENT
Start: 2021-03-26 | End: 2021-09-28

## 2021-03-26 NOTE — PATIENT INSTRUCTIONS
1) echocardiogram  
 
2) WILL STOP THE LIPITOR 3) BEGIN CRESTOR (ROSUVASTATIN)20 MG AT NIGHT 4) CHECK CHOLESTEROL IN 2 MO. 5) FOLLOW UP ME IN 3 MO.

## 2021-03-26 NOTE — PROGRESS NOTES
ATTENTION:   This medical record was transcribed using an electronic medical records/speech recognition system. Although proofread, it may and can contain electronic, spelling and other errors. Corrections may be executed at a later time. Please feel free to contact us for any clarifications as needed. ICD-10-CM ICD-9-CM   1. Essential hypertension  I10 401.9   2. H/O mitral valve repair  Z98.890 V15.1   3. Dyslipidemia  E78.5 272.4   4. BILLY on CPAP  G47.33 327.23    Z99.89 V46.8   5. Class 2 obesity due to excess calories with body mass index (BMI) of 39.0 to 39.9 in adult, unspecified whether serious comorbidity present  E66.09 278.00    Z68.39 V85.39            Arian Ochoa is a 73 y.o. female with hypertension, dyslipidemia, diabetes and mitral valve repair referred for 6 month follow up. Cardiac risk factors: family history, dyslipidemia, diabetes, obesity, hypertension, post-menopausal  I have personally obtained the history from the patient. HISTORY OF PRESENTING ILLNESS     States that she is doing well. She denies any chest pain or shortness of breath. Her sugars have been up a little bit and she knows she needs to reduce her carbohydrate intake. Sometimes she will use her rower about 2 days a week. She says for about 30 minutes. ACTIVE PROBLEM LIST     Patient Active Problem List    Diagnosis Date Noted    Severe obesity (BMI 35.0-39. 9) with comorbidity ([address] 04/20/2018    Mitral valve regurgitation 11/12/2010    Hypertension 11/12/2010    Hyperlipidemia 11/12/2010    Palpitations 11/12/2010           PAST MEDICAL HISTORY     Past Medical History:   Diagnosis Date    Diabetes ([address]     Hypertension     Mitral valve disorders(424.0)     Other ill-defined conditions(799.89)     mitral valve regurg    Sleep apnea with use of continuous positive airway pressure (CPAP)     Unspecified adverse effect of anesthesia     difficulty waking           PAST SURGICAL HISTORY     Past Surgical History:   Procedure Laterality Date    CARDIAC CATHETERIZATION      HX GYN  1990    part hysterectomy    HX GYN  1987    c section    CO ABDOMEN SURGERY PROC UNLISTED  1980s    laparotomy    CO CHEST SURGERY PROCEDURE UNLISTED      mitral valve repair          ALLERGIES     Allergies   Allergen Reactions    Other Plant, Animal, Environmental Other (comments)     hayfever          FAMILY HISTORY     Family History   Problem Relation Age of Onset    Heart Disease Mother     Cancer Mother     Hypertension Mother     Alzheimer Mother     Hypertension Father     Diabetes Father     Cancer Sister     Hypertension Sister     Breast Cancer Sister 46    Hypertension Brother     Cancer Maternal Aunt     Diabetes Maternal Aunt     Other Maternal Aunt         multiple myeloma     Cancer Maternal Grandmother     negative for cardiac disease       SOCIAL HISTORY     Social History     Socioeconomic History    Marital status:      Spouse name: Not on file    Number of children: Not on file    Years of education: Not on file    Highest education level: Not on file   Occupational History    Occupation:      Employer: [employer]   Tobacco Use    Smoking status: Never Smoker    Smokeless tobacco: Never Used   Substance and Sexual Activity    Alcohol use: Yes     Alcohol/week: 3.0 standard drinks     Types: 3 Glasses of wine per week     Comment: 3-4 week     Drug use: No    Sexual activity: Not Currently         MEDICATIONS     Current Outpatient Medications   Medication Sig    chlorthalidone (HYGROTEN) 25 mg tablet TAKE 1 TABLET BY MOUTH EVERY DAY    metFORMIN (GLUCOPHAGE) 500 mg tablet TAKE 1 TAB BY MOUTH TWO (2) TIMES DAILY (WITH MEALS).  cpap machine kit by Does Not Apply route.  atorvastatin (LIPITOR) 20 mg tablet Take 1 Tab by mouth daily. At bedtime    lisinopril (PRINIVIL, ZESTRIL) 10 mg tablet Take 1 Tab by mouth daily.     OTHER Indications: Tumeric    ascorbic acid, vitamin C, (VITAMIN C) 1,000 mg tablet Take  by mouth daily.  OTHER daily. Indications: vitamin for hair and nails    multivitamin (ONE A DAY) tablet Take 1 Tab by mouth daily.  omega-3 fatty acids-vitamin e (FISH OIL) 1,000 mg cap Take 1 Cap by mouth daily.  magnesium oxide (MAG-OX) 400 mg tablet Take 250 mg by mouth daily.  aspirin delayed-release 81 mg tablet Take  by mouth every other day. No current facility-administered medications for this visit. I have reviewed the nurses notes, vitals, problem list, allergy list, medical history, family, social history and medications. REVIEW OF SYMPTOMS   Pertinent positives per HPI  General: Pt denies excessive weight gain or loss. Pt is able to conduct ADL's  HEENT: Denies blurred vision, headaches, hearing loss, epistaxis and difficulty swallowing. Respiratory: Denies cough, congestion, shortness of breath, DOWLING, wheezing or stridor. Cardiovascular: Denies precordial pain, palpitations, edema or PND  Gastrointestinal: Denies poor appetite, indigestion, abdominal pain or blood in stool  Genitourinary: Denies hematuria, dysuria, increased urinary frequency  Musculoskeletal: Denies joint pain or swelling from muscles or joints  Neurologic: Denies tremor, paresthesias, headache, or sensory motor disturbance  Psychiatric: Denies confusion, insomnia, depression  Integumentray: Denies rash, itching or ulcers. Hematologic: Denies easy bruising, bleeding     PHYSICAL EXAMINATION      Vitals:    03/26/21 1614   BP: 120/70   Pulse: 64   SpO2: 99%   Weight: 218 lb (98.9 kg)   Height: 5' 4\" (1.626 m)     General: Well developed, in no acute distress. HEENT: No jaundice, oral mucosa moist, no oral ulcers  Neck: Supple, no stiffness, no lymphadenopathy, supple  Heart:  Normal S1/S2 negative S3 or S4.  Regular, no murmur, gallop or rub, no jugular venous distention  Respiratory: Clear bilaterally x 4, no wheezing or rales  Abdomen:   Soft, non-tender, bowel sounds are active.   Extremities:  No edema, normal cap refill, no cyanosis. Musculoskeletal: No clubbing, no deformities  Neuro: A&Ox3, speech clear, gait stable, cooperative, no focal neurologic deficits  Skin: Skin color is normal. No rashes or lesions. Non diaphoretic, moist.  Vascular: 2+ pulses symmetric in all extremities        EKG:      DIAGNOSTIC DATA     1. Echo   2/2/18- EF 65%, no WMAs.  Wall thickness mildly increased.  Prosthesis MV w/nml fx.     4/20/16- EF 60-65%, MV annular ring prosthesis exhibit normal function, TR mild   1/12/15- EF 55-60%, TR mild/mod, Pulm HTN mild ,Hx of TRR robotic assisted MVR using 29mm ATS adjustable ring There was mild regurgitation. 10/14/11: LVH, EF 55-60%     2. Loop   1/27/15 - SB 45-48 (only base line recorded)     3 . Cardiac catheterization   (10/14/10) : No significant CAD with severe MR   History TRR robotic assisted MVR using 29 mm ATS adjustable ring model [id number]     4.  Cholesterol profile   1/12/15: , HDL 58, ,    1/27/16: , HDL 57, LDL 90,    1/12/17- , HDL 49, LDL 90,    6/22/17- , HDL 52, LDL 85,    5/22/19- , HDL 52, ,   9/14/20- , HDL 44, LDL 99,          LABORATORY DATA            Lab Results   Component Value Date/Time    WBC 5.9 05/22/2019 08:30 AM    Hemoglobin (POC) 12.9 12/24/2012 06:26 AM    HGB 13.6 05/22/2019 08:30 AM    Hematocrit (POC) 38 12/24/2012 06:26 AM    HCT 41.6 05/22/2019 08:30 AM    PLATELET 048 98/85/8304 08:30 AM    MCV 84 05/22/2019 08:30 AM      Lab Results   Component Value Date/Time    Sodium 143 08/28/2019 08:50 AM    Potassium 4.9 08/28/2019 08:50 AM    Chloride 100 08/28/2019 08:50 AM    CO2 27 08/28/2019 08:50 AM    Anion gap 10 11/19/2010 03:55 AM    Glucose 128 (H) 08/28/2019 08:50 AM    BUN 18 08/28/2019 08:50 AM    Creatinine 0.75 08/28/2019 08:50 AM    BUN/Creatinine ratio 24 08/28/2019 08:50 AM    GFR est AA 98 08/28/2019 08:50 AM    GFR est non-AA 85 08/28/2019 08:50 AM    Calcium 10.8 (H) 08/28/2019 08:50 AM    Bilirubin, total 0.5 05/22/2019 08:30 AM    Alk. phosphatase 97 05/22/2019 08:30 AM    Protein, total 7.5 05/22/2019 08:30 AM    Albumin 5.0 (H) 05/22/2019 08:30 AM    Globulin 3.2 06/18/2011 10:10 PM    A-G Ratio 2.0 05/22/2019 08:30 AM    ALT (SGPT) 16 05/22/2019 08:30 AM           ASSESSMENT/RECOMMENDATIONS:.      1. Mitral valve repair  -Doing well with no issues regarding her mitral valve repair but she has not had echo in a while so we will check that  2. Dyslipidemia  -With her underlying diabetes I think we should be more aggressive in lowering her LDL; Crestor 20 mg daily stopped her Lipitor and recheck her cholesterol in 3 months  3. Hypertension  - Blood pressure is excellent at this time on chlorthalidone and lisinopril  4. BILLY  -States she is compliant  5. Diabetes mellitus  - last HGB A1C was 7  -Trying to reduce carbohydrate intake  6. Return in 3 months or PRN. Orders Placed This Encounter    AMB POC EKG ROUTINE W/ 12 LEADS, INTER & REP     Order Specific Question:   Reason for Exam:     Answer:   HTN          Follow-up and Dispositions  ·   Return in about 6 months (around 9/26/2021). I have discussed the diagnosis with  Xi Henderson and the intended plan as seen in the above orders. Questions were answered concerning future plans. I have discussed medication side effects and warnings with the patient as well. Thank you,  Fuad Rodriguez MD for involving me in the care of  Xi Henderson. Please do not hesitate to contact me for further questions/concerns. ADDENDUM:    (4/20/18): Ms. Rubens Mehta is a low cardiac risk for a low risk procedure and may proceed. No cardiac testing is  needed. Please call if you have any additional questions. Kwame Pearce MD, [phone], [address]      [phone] / [phone] Fax

## 2021-03-26 NOTE — PROGRESS NOTES
Samir Hassan is a 73 y.o. female    Visit Vitals  /70 (BP 1 Location: Left upper arm, BP Patient Position: Sitting, BP Cuff Size: Large adult)   Pulse 64   Ht 5' 4\" (1.626 m)   Wt 218 lb (98.9 kg)   SpO2 99%   BMI 37.42 kg/m²       Chief Complaint   Patient presents with    Cholesterol Problem    Hypertension    Other     MVR       Chest pain NO  SOB NO  Dizziness NO  Swelling NON  Recent hospital visit O  Refills NO

## 2021-03-29 ENCOUNTER — OFFICE VISIT (OUTPATIENT)
Dept: ENDOCRINOLOGY | Age: 66
End: 2021-03-29
Payer: MEDICARE

## 2021-03-29 VITALS
WEIGHT: 220 LBS | BODY MASS INDEX: 37.56 KG/M2 | TEMPERATURE: 97.4 F | DIASTOLIC BLOOD PRESSURE: 52 MMHG | OXYGEN SATURATION: 97 % | HEIGHT: 64 IN | SYSTOLIC BLOOD PRESSURE: 130 MMHG | HEART RATE: 63 BPM | RESPIRATION RATE: 16 BRPM

## 2021-03-29 DIAGNOSIS — E83.52 HYPERCALCEMIA: ICD-10-CM

## 2021-03-29 DIAGNOSIS — E55.9 VITAMIN D DEFICIENCY: ICD-10-CM

## 2021-03-29 DIAGNOSIS — E21.0 PRIMARY HYPERPARATHYROIDISM (HCC): Primary | ICD-10-CM

## 2021-03-29 PROCEDURE — 1101F PT FALLS ASSESS-DOCD LE1/YR: CPT | Performed by: INTERNAL MEDICINE

## 2021-03-29 PROCEDURE — 99205 OFFICE O/P NEW HI 60 MIN: CPT | Performed by: INTERNAL MEDICINE

## 2021-03-29 PROCEDURE — 3017F COLORECTAL CA SCREEN DOC REV: CPT | Performed by: INTERNAL MEDICINE

## 2021-03-29 PROCEDURE — G8754 DIAS BP LESS 90: HCPCS | Performed by: INTERNAL MEDICINE

## 2021-03-29 PROCEDURE — G8400 PT W/DXA NO RESULTS DOC: HCPCS | Performed by: INTERNAL MEDICINE

## 2021-03-29 PROCEDURE — G8752 SYS BP LESS 140: HCPCS | Performed by: INTERNAL MEDICINE

## 2021-03-29 PROCEDURE — G8510 SCR DEP NEG, NO PLAN REQD: HCPCS | Performed by: INTERNAL MEDICINE

## 2021-03-29 PROCEDURE — G8536 NO DOC ELDER MAL SCRN: HCPCS | Performed by: INTERNAL MEDICINE

## 2021-03-29 PROCEDURE — G8427 DOCREV CUR MEDS BY ELIG CLIN: HCPCS | Performed by: INTERNAL MEDICINE

## 2021-03-29 PROCEDURE — 1090F PRES/ABSN URINE INCON ASSESS: CPT | Performed by: INTERNAL MEDICINE

## 2021-03-29 PROCEDURE — G8417 CALC BMI ABV UP PARAM F/U: HCPCS | Performed by: INTERNAL MEDICINE

## 2021-03-29 RX ORDER — FUROSEMIDE 20 MG/1
20 TABLET ORAL DAILY
Qty: 30 TAB | Refills: 0 | Status: SHIPPED | OUTPATIENT
Start: 2021-03-29 | End: 2021-06-14

## 2021-03-29 RX ORDER — METFORMIN HYDROCHLORIDE 1000 MG/1
1000 TABLET ORAL 2 TIMES DAILY WITH MEALS
COMMUNITY

## 2021-03-29 NOTE — PATIENT INSTRUCTIONS
Hold Chlorthalidone for 4 weeks  , then increase lisinopril 20 mg in AM  
If you notice leg swelling then Lasix 20 mg every other day or every day At the end of 4 weeks need to do blood work and 24 hour urine 24 hour urine collection instructions On the day you plan to collect urine 1. Discard first urine sample soon after you get up 2. Start collecting urine samples in the container then on whole first day . Kim Ferrara ... 3. until the first sample next day is collected into the jar. I have ordered scan/test and if you do not hear from the hospital in 3- 4 business days  please call the number [phone] to speak with the scheduling team directly.  
 
 
ENT Dr Antonieta Segundo / Dr Zhao Maciel /Dr Larry Ford

## 2021-03-29 NOTE — PROGRESS NOTES
Bettina Toth MD          Patient Information  Name : Gabbie Victoria 73 y.o.     YOB: 1955         Referred by: Kathleen Vazquez MD       Chief Complaint   Patient presents with   [address] [place] [address] Patient     referred for Parathyroid       History of present illness:    Gabbie Victoria is a 73 y.o. female here for evaluation of hypercalcemia. She has hypercalcemia dating back to 2011, initially was mild, now calcium has been ranging from 11.2-11.5, with elevated PTH. She is on chlorthalidone. 24-hour urine calcium was 456  Creatinine 0.67, , , vitamin D 38  No nausea,abdominal pain  No polyuria,polydipsia, nocturia  No h/o HCTZ,lithium,theophylline usage  No h/o kidney stones,PUD  No h/o osteoporosis,fragility fractures  No family h/o of calcium disorders or nephrolithiasis or [address]  DXA scan -none    Type 2 diabetes: On Metformin        BP Readings from Last 3 Encounters:   03/29/21 (!) 130/52   03/26/21 120/70   07/11/19 138/80       Wt Readings from Last 3 Encounters:   03/29/21 220 lb (99.8 kg)   03/26/21 218 lb (98.9 kg)   07/11/19 229 lb (103.9 kg)       Past Medical History:   Diagnosis Date    Diabetes ([address]     Hypertension     Mitral valve disorders(424.0)     Other ill-defined conditions(799.89)     mitral valve regurg    Sleep apnea with use of continuous positive airway pressure (CPAP)     Unspecified adverse effect of anesthesia     difficulty waking       Current Outpatient Medications   Medication Sig    metFORMIN (GLUCOPHAGE) 1,000 mg tablet Take 1,000 mg by mouth two (2) times daily (with meals).  rosuvastatin (CRESTOR) 20 mg tablet Take 1 Tab by mouth nightly.  chlorthalidone (HYGROTEN) 25 mg tablet TAKE 1 TABLET BY MOUTH EVERY DAY    cpap machine kit by Does Not Apply route.  lisinopril (PRINIVIL, ZESTRIL) 10 mg tablet Take 1 Tab by mouth daily.     OTHER Indications: Tumeric    ascorbic acid, vitamin C, (VITAMIN C) 1,000 mg tablet Take  by mouth daily.  multivitamin (ONE A DAY) tablet Take 1 Tab by mouth daily.  omega-3 fatty acids-vitamin e (FISH OIL) 1,000 mg cap Take 1 Cap by mouth daily.  magnesium oxide (MAG-OX) 400 mg tablet Take 250 mg by mouth daily.  aspirin delayed-release 81 mg tablet Take  by mouth every other day.  OTHER daily. Indications: vitamin for hair and nails     No current facility-administered medications for this visit. Allergies   Allergen Reactions    Other Plant, Animal, Environmental Other (comments)     hayfever         Review of Systems:  - Per HPI    Physical Examination:  Blood pressure (!) 130/52, pulse 63, temperature 97.4 °F (36.3 °C), temperature source Oral, resp. rate 16, height 5' 4\" (1.626 m), weight 220 lb (99.8 kg), SpO2 97 %.   - General: pleasant, no distress, good eye contact  - HEENT: no pallor, no periorbital edema, EOMI  - Neck: supple, no thyromegaly, no nodules  - Cardiovascular: regular,  normal S1 and S2,   - Respiratory: clear to auscultation bilaterally  - Gastrointestinal: soft, nontender, nondistended,  BS +  - Musculoskeletal: no edema  - Neurological: alert and oriented  - Psychiatric: normal mood and affect    Data Reviewed:     Lab Results   Component Value Date/Time    Calcium 10.8 (H) 08/28/2019 08:50 AM     Lab Results   Component Value Date/Time    VITAMIN D, 25-HYDROXY 42.9 02/22/2018 11:33 AM       Lab Results   Component Value Date/Time    TSH 0.527 02/22/2018 11:33 AM     Lab Results   Component Value Date/Time    Sodium 143 08/28/2019 08:50 AM    Potassium 4.9 08/28/2019 08:50 AM    Chloride 100 08/28/2019 08:50 AM    CO2 27 08/28/2019 08:50 AM    Anion gap 10 11/19/2010 03:55 AM    Glucose 128 (H) 08/28/2019 08:50 AM    BUN 18 08/28/2019 08:50 AM    Creatinine 0.75 08/28/2019 08:50 AM    BUN/Creatinine ratio 24 08/28/2019 08:50 AM    GFR est AA 98 08/28/2019 08:50 AM    GFR est non-AA 85 08/28/2019 08:50 AM    Calcium 10.8 (H) 08/28/2019 08:50 AM       [] Reviewed labs    Assessment/Plan:   Hypercalcemia  Primary hyperparathyroidism  Calcium has been increasing  24 hour urine calcium 456, total volume 2.9 L, urine creatinine 1639mg/dL which makes primary hyperparathyroidism more likely than chlorthalidone induced hypercalcemia in which 24-hour urine calcium will be lower due to hypercalciuria. She was on hydrochlorothiazide before, now on chlorthalidone. Discussed therapeutic options, criteria for surgery  She is interested in surgery  Parathyroid scan, neck ultrasound  Hydration  Check vitamin D and replete it for bone health and also to prevent severe hypocalcemia post surgery  Discussed the associated risk factors of parathyroidectomy, complications, transient hypocalcemia post surgery    Type 2 diabetes mellitus: On Metformin    Vitamin D deficiency    There are no Patient Instructions on file for this visit. Thank you for allowing me to participate in the care of this patient. Melyssa Walters MD        Patient Jefry Genao verbalized understanding   Voice-recognition software was used to generate this report, which may result in some phonetic-based errors in the grammar and contents. Even though attempts were made to correct all the mistakes, some may have been missed and remained in the body of the report.

## 2021-03-29 NOTE — LETTER
3/30/2021    Patient: Edith Hay   YOB: 1955   Date of Visit: 3/29/2021     Melina Barrett, [address] [place] [address] Drive [address]  Via Fax: [phone]    Dear Melina Barrett MD,      Thank you for referring Ms. Emely Schaefer to [id number] [place] [address] for evaluation. My notes for this consultation are attached. If you have questions, please do not hesitate to call me. I look forward to following your patient along with you.       Sincerely,    Zoran Kenny MD

## 2021-03-29 NOTE — PROGRESS NOTES
Gabbie Victoria is a 73 y.o. female here for   Chief Complaint   Patient presents with    New Patient     referred for Parathyroid       1. Have you been to the ER, urgent care clinic since your last visit? Hospitalized since your last visit? -n/a    2. Have you seen or consulted any other health care providers outside of the [address] [place] [address] since your last visit?   Include any pap smears or colon screening.-n/a

## 2021-03-30 ENCOUNTER — TRANSCRIBE ORDER (OUTPATIENT)
Dept: SCHEDULING | Age: 66
End: 2021-03-30

## 2021-03-30 DIAGNOSIS — E21.0 PRIMARY HYPERPARATHYROIDISM (HCC): Primary | ICD-10-CM

## 2021-03-30 DIAGNOSIS — E83.52 HYPERCALCEMIA: ICD-10-CM

## 2021-03-30 DIAGNOSIS — E55.9 AVITAMINOSIS D: ICD-10-CM

## 2021-03-31 ENCOUNTER — IMMUNIZATION (OUTPATIENT)
Dept: INTERNAL MEDICINE CLINIC | Age: 66
End: 2021-03-31
Payer: MEDICARE

## 2021-03-31 DIAGNOSIS — Z23 ENCOUNTER FOR IMMUNIZATION: Primary | ICD-10-CM

## 2021-03-31 PROCEDURE — 0002A COVID-19, MRNA, LNP-S, PF, 30MCG/0.3ML DOSE(PFIZER): CPT | Performed by: FAMILY MEDICINE

## 2021-03-31 PROCEDURE — 91300 COVID-19, MRNA, LNP-S, PF, 30MCG/0.3ML DOSE(PFIZER): CPT | Performed by: FAMILY MEDICINE

## 2021-04-21 ENCOUNTER — HOSPITAL ENCOUNTER (OUTPATIENT)
Dept: ULTRASOUND IMAGING | Age: 66
Discharge: HOME | End: 2021-04-21
Attending: INTERNAL MEDICINE
Payer: MEDICARE

## 2021-04-21 ENCOUNTER — HOSPITAL ENCOUNTER (OUTPATIENT)
Dept: NUCLEAR MEDICINE | Age: 66
Discharge: HOME | End: 2021-04-21
Attending: INTERNAL MEDICINE
Payer: MEDICARE

## 2021-04-21 ENCOUNTER — ANCILLARY PROCEDURE (OUTPATIENT)
Dept: CARDIOLOGY CLINIC | Age: 66
End: 2021-04-21
Payer: MEDICARE

## 2021-04-21 VITALS
BODY MASS INDEX: 37.56 KG/M2 | SYSTOLIC BLOOD PRESSURE: 150 MMHG | WEIGHT: 220 LBS | DIASTOLIC BLOOD PRESSURE: 70 MMHG | HEIGHT: 64 IN

## 2021-04-21 DIAGNOSIS — Z98.890 H/O MITRAL VALVE REPAIR: ICD-10-CM

## 2021-04-21 DIAGNOSIS — E78.5 DYSLIPIDEMIA: ICD-10-CM

## 2021-04-21 DIAGNOSIS — G47.33 OSA ON CPAP: ICD-10-CM

## 2021-04-21 DIAGNOSIS — I10 ESSENTIAL HYPERTENSION: ICD-10-CM

## 2021-04-21 DIAGNOSIS — E55.9 VITAMIN D DEFICIENCY: ICD-10-CM

## 2021-04-21 DIAGNOSIS — E21.0 PRIMARY HYPERPARATHYROIDISM (HCC): ICD-10-CM

## 2021-04-21 DIAGNOSIS — Z99.89 OSA ON CPAP: ICD-10-CM

## 2021-04-21 DIAGNOSIS — E83.52 HYPERCALCEMIA: ICD-10-CM

## 2021-04-21 DIAGNOSIS — E55.9 AVITAMINOSIS D: ICD-10-CM

## 2021-04-21 DIAGNOSIS — E66.09 CLASS 2 OBESITY DUE TO EXCESS CALORIES WITH BODY MASS INDEX (BMI) OF 39.0 TO 39.9 IN ADULT, UNSPECIFIED WHETHER SERIOUS COMORBIDITY PRESENT: ICD-10-CM

## 2021-04-21 LAB
ECHO AO ASC DIAM: 2.96 CM
ECHO AO ROOT DIAM: 3.3 CM
ECHO AV AREA PEAK VELOCITY: 3.4 CM2
ECHO AV AREA VTI: 3.43 CM2
ECHO AV AREA/BSA PEAK VELOCITY: 1.7 CM2/M2
ECHO AV AREA/BSA VTI: 1.7 CM2/M2
ECHO AV MEAN GRADIENT: 3.44 MMHG
ECHO AV PEAK GRADIENT: 7.26 MMHG
ECHO AV PEAK VELOCITY: 134.76 CM/S
ECHO AV VTI: 29.11 CM
ECHO EST RA PRESSURE: 3 MMHG
ECHO IVC PROX: 1.77 CM
ECHO LA AREA 4C: 26.54 CM2
ECHO LA MAJOR AXIS: 4.45 CM
ECHO LA MINOR AXIS: 2.18 CM
ECHO LA VOL 2C: 86.54 ML (ref 22–52)
ECHO LA VOL 4C: 91.99 ML (ref 22–52)
ECHO LA VOL BP: 97.65 ML (ref 22–52)
ECHO LA VOL/BSA BIPLANE: 47.94 ML/M2 (ref 16–28)
ECHO LA VOLUME INDEX A2C: 42.49 ML/M2 (ref 16–28)
ECHO LA VOLUME INDEX A4C: 45.16 ML/M2 (ref 16–28)
ECHO LV E' LATERAL VELOCITY: 5.57 CM/S
ECHO LV E' SEPTAL VELOCITY: 5.78 CM/S
ECHO LV INTERNAL DIMENSION DIASTOLIC: 5.21 CM (ref 3.9–5.3)
ECHO LV INTERNAL DIMENSION SYSTOLIC: 3.4 CM
ECHO LV IVSD: 0.81 CM (ref 0.6–0.9)
ECHO LV MASS 2D: 144.5 G (ref 67–162)
ECHO LV MASS INDEX 2D: 71 G/M2 (ref 43–95)
ECHO LV POSTERIOR WALL DIASTOLIC: 0.78 CM (ref 0.6–0.9)
ECHO LVOT DIAM: 2.18 CM
ECHO LVOT PEAK GRADIENT: 6.01 MMHG
ECHO LVOT PEAK VELOCITY: 122.57 CM/S
ECHO LVOT SV: 99.9 ML
ECHO LVOT VTI: 26.73 CM
ECHO MV A VELOCITY: 121.18 CM/S
ECHO MV E DECELERATION TIME (DT): 507.83 MS
ECHO MV E VELOCITY: 137.34 CM/S
ECHO MV E/A RATIO: 1.13
ECHO MV E/E' LATERAL: 24.66
ECHO MV E/E' RATIO (AVERAGED): 24.21
ECHO MV E/E' SEPTAL: 23.76
ECHO MV MAX VELOCITY: 173.8 CM/S
ECHO MV MEAN GRADIENT: 3.84 MMHG
ECHO MV PEAK GRADIENT: 12.08 MMHG
ECHO MV PRESSURE HALF TIME (PHT): 147.27 MS
ECHO MV VTI: 54.28 CM
ECHO RA AREA 4C: 19.67 CM2
ECHO RIGHT VENTRICULAR SYSTOLIC PRESSURE (RVSP): 41.31 MMHG
ECHO RV INTERNAL DIMENSION: 3.47 CM
ECHO RV TAPSE: 2.05 CM (ref 1.5–2)
ECHO TV REGURGITANT MAX VELOCITY: 309.46 CM/S
ECHO TV REGURGITANT PEAK GRADIENT: 38.31 MMHG

## 2021-04-21 PROCEDURE — 76536 US EXAM OF HEAD AND NECK: CPT

## 2021-04-21 PROCEDURE — 93306 TTE W/DOPPLER COMPLETE: CPT | Performed by: SPECIALIST

## 2021-04-21 PROCEDURE — 78070 PARATHYROID PLANAR IMAGING: CPT

## 2021-04-22 ENCOUNTER — TRANSCRIBE ORDER (OUTPATIENT)
Dept: SCHEDULING | Age: 66
End: 2021-04-22

## 2021-04-22 DIAGNOSIS — Z13.820 SPECIAL SCREENING FOR OSTEOPOROSIS: ICD-10-CM

## 2021-04-22 DIAGNOSIS — E21.3 HYPERPARATHYROIDISM, UNSPECIFIED (HCC): Primary | ICD-10-CM

## 2021-04-22 LAB
25(OH)D3+25(OH)D2 SERPL-MCNC: 38.4 NG/ML (ref 30–100)
ALBUMIN SERPL-MCNC: 4.9 G/DL (ref 3.8–4.8)
ALBUMIN/GLOB SERPL: 2 {RATIO} (ref 1.2–2.2)
ALP SERPL-CCNC: 80 IU/L (ref 39–117)
ALT SERPL-CCNC: 22 IU/L (ref 0–32)
AST SERPL-CCNC: 21 IU/L (ref 0–40)
BILIRUB SERPL-MCNC: 0.4 MG/DL (ref 0–1.2)
BUN SERPL-MCNC: 11 MG/DL (ref 8–27)
BUN/CREAT SERPL: 15 (ref 12–28)
CALCIUM 24H UR-MCNC: 14.6 MG/DL
CALCIUM 24H UR-MRATE: 438 MG/24 HR (ref 47–462)
CALCIUM SERPL-MCNC: 11.3 MG/DL (ref 8.7–10.3)
CHLORIDE SERPL-SCNC: 106 MMOL/L (ref 96–106)
CO2 SERPL-SCNC: 26 MMOL/L (ref 20–29)
CREAT 24H UR-MRATE: 1197 MG/24 HR (ref 800–1800)
CREAT SERPL-MCNC: 0.75 MG/DL (ref 0.57–1)
CREAT UR-MCNC: 39.9 MG/DL
GLOBULIN SER CALC-MCNC: 2.4 G/DL (ref 1.5–4.5)
GLUCOSE SERPL-MCNC: 110 MG/DL (ref 65–99)
POTASSIUM SERPL-SCNC: 5.2 MMOL/L (ref 3.5–5.2)
PROT SERPL-MCNC: 7.3 G/DL (ref 6–8.5)
PTH-INTACT SERPL-MCNC: 45 PG/ML (ref 15–65)
SODIUM SERPL-SCNC: 144 MMOL/L (ref 134–144)

## 2021-04-28 ENCOUNTER — TRANSCRIBE ORDER (OUTPATIENT)
Dept: SCHEDULING | Age: 66
End: 2021-04-28

## 2021-04-28 DIAGNOSIS — D35.1 BENIGN NEOPLASM OF PARATHYROID GLAND: Primary | ICD-10-CM

## 2021-04-29 ENCOUNTER — HOSPITAL ENCOUNTER (OUTPATIENT)
Dept: MAMMOGRAPHY | Age: 66
Discharge: HOME | End: 2021-04-29
Attending: INTERNAL MEDICINE
Payer: MEDICARE

## 2021-04-29 DIAGNOSIS — E21.3 HYPERPARATHYROIDISM, UNSPECIFIED (HCC): ICD-10-CM

## 2021-04-29 DIAGNOSIS — Z13.820 SPECIAL SCREENING FOR OSTEOPOROSIS: ICD-10-CM

## 2021-04-29 PROCEDURE — 77080 DXA BONE DENSITY AXIAL: CPT

## 2021-05-06 ENCOUNTER — HOSPITAL ENCOUNTER (OUTPATIENT)
Dept: CT IMAGING | Age: 66
Discharge: HOME | End: 2021-05-06
Attending: OTOLARYNGOLOGY

## 2021-05-06 DIAGNOSIS — D35.1 BENIGN NEOPLASM OF PARATHYROID GLAND: ICD-10-CM

## 2021-05-14 ENCOUNTER — HOSPITAL ENCOUNTER (OUTPATIENT)
Dept: CT IMAGING | Age: 66
Discharge: HOME | End: 2021-05-14
Attending: OTOLARYNGOLOGY
Payer: MEDICARE

## 2021-05-14 PROCEDURE — 74011000636 HC RX REV CODE- 636: Performed by: RADIOLOGY

## 2021-05-14 PROCEDURE — 70491 CT SOFT TISSUE NECK W/DYE: CPT

## 2021-05-14 RX ADMIN — IOPAMIDOL 100 ML: 612 INJECTION, SOLUTION INTRAVENOUS at 08:29

## 2021-06-11 PROBLEM — E21.0 PRIMARY HYPERPARATHYROIDISM (HCC): Status: ACTIVE | Noted: 2021-06-11

## 2021-06-11 PROBLEM — E55.9 VITAMIN D DEFICIENCY: Status: ACTIVE | Noted: 2021-06-11

## 2021-06-11 PROBLEM — E83.52 HYPERCALCEMIA: Status: ACTIVE | Noted: 2021-06-11

## 2021-06-11 PROBLEM — G47.30 SLEEP APNEA: Status: ACTIVE | Noted: 2021-06-11

## 2021-06-11 PROBLEM — E11.9 DIABETES (HCC): Status: ACTIVE | Noted: 2021-06-11

## 2021-06-13 NOTE — PROGRESS NOTES
ATTENTION:   This medical record was transcribed using an electronic medical records/speech recognition system. Although proofread, it may and can contain electronic, spelling and other errors. Corrections may be executed at a later time. Please feel free to contact us for any clarifications as needed. ICD-10-CM ICD-9-CM   1. Essential hypertension  I10 401.9   2. H/O mitral valve repair  Z98.890 V15.1   3. Dyslipidemia  E78.5 272.4   4. BILLY on CPAP  G47.33 327.23    Z99.89 V46.8   5. Class 2 obesity due to excess calories with body mass index (BMI) of 39.0 to 39.9 in adult, unspecified whether serious comorbidity present  E66.09 278.00    Z68.39 V85.39            Tanner Disla is a 73 y.o. female with hypertension, dyslipidemia, diabetes and mitral valve repair referred for 6 month follow up. Cardiac risk factors: family history, dyslipidemia, diabetes, obesity, hypertension, post-menopausal  I have personally obtained the history from the patient. HISTORY OF PRESENTING ILLNESS     No interval cardiac issues. Is having parathyroid surgery soon. ACTIVE PROBLEM LIST     Patient Active Problem List    Diagnosis Date Noted    Diabetes ([facility] [address] 06/11/2021    Sleep apnea 06/11/2021    Primary hyperparathyroidism ([address] 06/11/2021    Hypercalcemia 06/11/2021    Vitamin D deficiency 06/11/2021    Severe obesity (BMI 35.0-39. 9) with comorbidity ([address] 04/20/2018    Mitral valve regurgitation 11/12/2010    Hypertension 11/12/2010    Hyperlipidemia 11/12/2010    Palpitations 11/12/2010           PAST MEDICAL HISTORY     Past Medical History:   Diagnosis Date    Diabetes ([address]     Hypertension     Mitral valve disorders(424.0)     Other ill-defined conditions(799.89)     mitral valve regurg    Sleep apnea with use of continuous positive airway pressure (CPAP)     Unspecified adverse effect of anesthesia     difficulty waking           PAST SURGICAL HISTORY     Past Surgical History:   Procedure Laterality Date    CARDIAC CATHETERIZATION      HX  SECTION  1987    c section    HX COLONOSCOPY  2016    HX GI  10/2011    CAMERON    HX MITRAL VALVULOPLASTY  2010    HX MYOMECTOMY  1984    HX PARTIAL HYSTERECTOMY      [facility] ABDOMEN SURGERY [address] UNLISTED  1980s    laparotomy    [facility] CHEST SURGERY PROCEDURE UNLISTED      mitral valve repair          ALLERGIES     Allergies   Allergen Reactions    Other Plant, Animal, Environmental Other (comments)     hayfever          FAMILY HISTORY     Family History   Problem Relation Age of Onset    Heart Disease Mother     Cancer Mother     Hypertension Mother     Alzheimer Mother     Hypertension Father     Diabetes Father     Cancer Sister     Hypertension Sister     Breast Cancer Sister 46    Hypertension Brother     Cancer Maternal Aunt     Diabetes Maternal Aunt     Other Maternal Aunt         multiple myeloma     Cancer Maternal Grandmother     negative for cardiac disease       SOCIAL HISTORY     Social History     Socioeconomic History    Marital status:      Spouse name: Not on file    Number of children: Not on file    Years of education: Not on file    Highest education level: Not on file   Occupational History    Occupation:      Employer: [employer]   Tobacco Use    Smoking status: Never Smoker    Smokeless tobacco: Never Used   Substance and Sexual Activity    Alcohol use: Yes     Alcohol/week: 3.0 standard drinks     Types: 3 Glasses of wine per week     Comment: 3-4 week     Drug use: No    Sexual activity: Not Currently     Social Determinants of Health     Financial Resource Strain:     Difficulty of Paying Living Expenses:    Food Insecurity:     Worried About Running Out of Food in the Last Year:     Ran Out of Food in the Last Year:    Transportation Needs:     Lack of Transportation (Medical):      Lack of Transportation (Non-Medical):    Physical Activity:     Days of Exercise per Week:     Minutes of Exercise per Session:    Stress:     Feeling of Stress :    Social Connections:     Frequency of Communication with Friends and Family:     Frequency of Social Gatherings with Friends and Family:     Attends [employer] Services:     Active Member of Clubs or Organizations:     Attends Club or Organization Meetings:     Marital Status:          MEDICATIONS     Current Outpatient Medications   Medication Sig    OTHER Black strap malassis    metFORMIN (GLUCOPHAGE) 1,000 mg tablet Take 1,000 mg by mouth two (2) times daily (with meals).  rosuvastatin (CRESTOR) 20 mg tablet Take 1 Tab by mouth nightly.  chlorthalidone (HYGROTEN) 25 mg tablet TAKE 1 TABLET BY MOUTH EVERY DAY    cpap machine kit by Does Not Apply route.  lisinopril (PRINIVIL, ZESTRIL) 10 mg tablet Take 1 Tab by mouth daily.  ascorbic acid, vitamin C, (VITAMIN C) 1,000 mg tablet Take  by mouth daily.  multivitamin (ONE A DAY) tablet Take 1 Tab by mouth daily.  omega-3 fatty acids-vitamin e (FISH OIL) 1,000 mg cap Take 1 Cap by mouth daily. No current facility-administered medications for this visit. I have reviewed the nurses notes, vitals, problem list, allergy list, medical history, family, social history and medications. REVIEW OF SYMPTOMS   Pertinent positives per HPI  General: Pt denies excessive weight gain or loss. Pt is able to conduct ADL's  HEENT: Denies blurred vision, headaches, hearing loss, epistaxis and difficulty swallowing. Respiratory: Denies cough, congestion, shortness of breath, DOWLING, wheezing or stridor.   Cardiovascular: Denies precordial pain, palpitations, edema or PND  Gastrointestinal: Denies poor appetite, indigestion, abdominal pain or blood in stool  Genitourinary: Denies hematuria, dysuria, increased urinary frequency  Musculoskeletal: Denies joint pain or swelling from muscles or joints  Neurologic: Denies tremor, paresthesias, headache, or sensory motor disturbance  Psychiatric: Denies confusion, insomnia, depression  Integumentray: Denies rash, itching or ulcers. Hematologic: Denies easy bruising, bleeding     PHYSICAL EXAMINATION      Vitals:    06/14/21 1605   BP: 121/60   Pulse: 72   Weight: 209 lb 12.8 oz (95.2 kg)   Height: 5' 4\" (1.626 m)     General: Well developed, in no acute distress. HEENT: No jaundice, oral mucosa moist, no oral ulcers  Neck: Supple, no stiffness, no lymphadenopathy, supple  Heart:  Normal S1/S2 negative S3 or S4. Regular, no murmur, gallop or rub, no jugular venous distention  Respiratory: Clear bilaterally x 4, no wheezing or rales  Abdomen:   Soft, non-tender, bowel sounds are active.   Extremities:  No edema, normal cap refill, no cyanosis. Musculoskeletal: No clubbing, no deformities  Neuro: A&Ox3, speech clear, gait stable, cooperative, no focal neurologic deficits  Skin: Skin color is normal. No rashes or lesions. Non diaphoretic, moist.  Vascular: 2+ pulses symmetric in all extremities        EKG:      DIAGNOSTIC DATA     1. Echo   2/2/18- EF 65%, no WMAs.  Wall thickness mildly increased.  Prosthesis MV w/nml fx.     4/20/16- EF 60-65%, MV annular ring prosthesis exhibit normal function, TR mild   1/12/15- EF 55-60%, TR mild/mod, Pulm HTN mild ,Hx of TRR robotic assisted MVR using 29mm ATS adjustable ring There was mild regurgitation. 10/14/11: LVH, EF 55-60%   4/21/21-EF 60 - 65%, LAE, Mitral valve repaired with annuloplasty ring. Mitral valve mean gradient is 3.8 mmHg. S/P 20 mm ATS Ring model 735AF-29, mild TR, mild Pulm HTN, PAP 41 mmHg    2. Loop   1/27/15 - SB 45-48 (only base line recorded)     3 . Cardiac catheterization   (10/14/10) : No significant CAD with severe MR   History TRR robotic assisted MVR using 29 mm ATS adjustable ring model [id number]-29     4.  Cholesterol profile   1/12/15: , HDL 58, ,    1/27/16: , HDL 57, LDL 90,    1/12/17- , HDL 49, LDL 90,  6/22/17- , HDL 52, LDL 85,    5/22/19- , HDL 52, ,    9/14/20- , HDL 44, LDL 99,    6/9/21- , HDL 47, LDL 67, TG 88         LABORATORY DATA            Lab Results   Component Value Date/Time    WBC 5.9 05/22/2019 08:30 AM    Hemoglobin (POC) 12.9 12/24/2012 06:26 AM    HGB 13.6 05/22/2019 08:30 AM    Hematocrit (POC) 38 12/24/2012 06:26 AM    HCT 41.6 05/22/2019 08:30 AM    PLATELET 790 88/81/9720 08:30 AM    MCV 84 05/22/2019 08:30 AM      Lab Results   Component Value Date/Time    Sodium 144 04/21/2021 09:11 AM    Potassium 5.2 04/21/2021 09:11 AM    Chloride 106 04/21/2021 09:11 AM    CO2 26 04/21/2021 09:11 AM    Anion gap 10 11/19/2010 03:55 AM    Glucose 110 (H) 04/21/2021 09:11 AM    BUN 11 04/21/2021 09:11 AM    Creatinine 0.75 04/21/2021 09:11 AM    BUN/Creatinine ratio 15 04/21/2021 09:11 AM    GFR est AA 97 04/21/2021 09:11 AM    GFR est non-AA 84 04/21/2021 09:11 AM    Calcium 11.3 (H) 04/21/2021 09:11 AM    Bilirubin, total 0.4 04/21/2021 09:11 AM    Alk. phosphatase 80 04/21/2021 09:11 AM    Protein, total 7.3 04/21/2021 09:11 AM    Albumin 4.9 (H) 04/21/2021 09:11 AM    Globulin 3.2 06/18/2011 10:10 PM    A-G Ratio 2.0 04/21/2021 09:11 AM    ALT (SGPT) 22 04/21/2021 09:11 AM           ASSESSMENT/RECOMMENDATIONS:.      1. Mitral valve repair  -Doing well with no issues regarding her mitral valve repair   -last echo revealed normal MV repair  2. Dyslipidemia  -LDL is at goal now at 67  3. Hypertension  - Blood pressure is excellent at this time on chlorthalidone and lisinopril and no adjustments  -conintue low sodium diet  4. BILLY  -States she is compliant  5. Diabetes mellitus  - last HGB A1C is not available  -Trying to reduce carbohydrate intake  6. Cardiac preoperative risk stratification   -low cardiac operative risk and may proceed. 6. Return in 6 mo or PRN.      Orders Placed This Encounter    OTHER     Sig: Black stracass segura Follow-up and Dispositions  ·   Return in about 6 months (around 12/14/2021). I have discussed the diagnosis with  Marlin Ramos and the intended plan as seen in the above orders. Questions were answered concerning future plans. I have discussed medication side effects and warnings with the patient as well. Thank you,  Karlos Rees MD for involving me in the care of  Marlin Ramos. Please do not hesitate to contact me for further questions/concerns. ADDENDUM:    (4/20/18): Ms. Tony is a low cardiac risk for a low risk procedure and may proceed. No cardiac testing is  needed. Please call if you have any additional questions. Kwame Pearce MD, [address] [place] [address] Rd., [address]      [facility], [address] [place] [address]      [phone] / [phone] Fax

## 2021-06-14 ENCOUNTER — OFFICE VISIT (OUTPATIENT)
Dept: CARDIOLOGY CLINIC | Age: 66
End: 2021-06-14
Payer: MEDICARE

## 2021-06-14 VITALS
DIASTOLIC BLOOD PRESSURE: 60 MMHG | HEART RATE: 72 BPM | SYSTOLIC BLOOD PRESSURE: 121 MMHG | WEIGHT: 209.8 LBS | HEIGHT: 64 IN | BODY MASS INDEX: 35.82 KG/M2

## 2021-06-14 DIAGNOSIS — E78.5 DYSLIPIDEMIA: ICD-10-CM

## 2021-06-14 DIAGNOSIS — I10 ESSENTIAL HYPERTENSION: Primary | ICD-10-CM

## 2021-06-14 DIAGNOSIS — Z99.89 OSA ON CPAP: ICD-10-CM

## 2021-06-14 DIAGNOSIS — Z98.890 H/O MITRAL VALVE REPAIR: ICD-10-CM

## 2021-06-14 DIAGNOSIS — E66.09 CLASS 2 OBESITY DUE TO EXCESS CALORIES WITH BODY MASS INDEX (BMI) OF 39.0 TO 39.9 IN ADULT, UNSPECIFIED WHETHER SERIOUS COMORBIDITY PRESENT: ICD-10-CM

## 2021-06-14 DIAGNOSIS — G47.33 OSA ON CPAP: ICD-10-CM

## 2021-06-14 PROCEDURE — G8754 DIAS BP LESS 90: HCPCS | Performed by: SPECIALIST

## 2021-06-14 PROCEDURE — 1090F PRES/ABSN URINE INCON ASSESS: CPT | Performed by: SPECIALIST

## 2021-06-14 PROCEDURE — G8536 NO DOC ELDER MAL SCRN: HCPCS | Performed by: SPECIALIST

## 2021-06-14 PROCEDURE — 99214 OFFICE O/P EST MOD 30 MIN: CPT | Performed by: SPECIALIST

## 2021-06-14 PROCEDURE — 3017F COLORECTAL CA SCREEN DOC REV: CPT | Performed by: SPECIALIST

## 2021-06-14 PROCEDURE — G8399 PT W/DXA RESULTS DOCUMENT: HCPCS | Performed by: SPECIALIST

## 2021-06-14 PROCEDURE — G8417 CALC BMI ABV UP PARAM F/U: HCPCS | Performed by: SPECIALIST

## 2021-06-14 PROCEDURE — G8427 DOCREV CUR MEDS BY ELIG CLIN: HCPCS | Performed by: SPECIALIST

## 2021-06-14 PROCEDURE — G8752 SYS BP LESS 140: HCPCS | Performed by: SPECIALIST

## 2021-06-14 PROCEDURE — G8432 DEP SCR NOT DOC, RNG: HCPCS | Performed by: SPECIALIST

## 2021-06-14 PROCEDURE — 1101F PT FALLS ASSESS-DOCD LE1/YR: CPT | Performed by: SPECIALIST

## 2021-06-28 ENCOUNTER — OFFICE VISIT (OUTPATIENT)
Dept: ENDOCRINOLOGY | Age: 66
End: 2021-06-28
Payer: MEDICARE

## 2021-06-28 ENCOUNTER — HOSPITAL ENCOUNTER (OUTPATIENT)
Dept: PREADMISSION TESTING | Age: 66
Discharge: HOME | End: 2021-06-28
Payer: MEDICARE

## 2021-06-28 VITALS
SYSTOLIC BLOOD PRESSURE: 137 MMHG | DIASTOLIC BLOOD PRESSURE: 67 MMHG | BODY MASS INDEX: 35.17 KG/M2 | OXYGEN SATURATION: 98 % | TEMPERATURE: 96.8 F | HEART RATE: 67 BPM | WEIGHT: 206 LBS | HEIGHT: 64 IN

## 2021-06-28 VITALS
HEIGHT: 64 IN | WEIGHT: 208.11 LBS | BODY MASS INDEX: 35.53 KG/M2 | RESPIRATION RATE: 18 BRPM | HEART RATE: 78 BPM | SYSTOLIC BLOOD PRESSURE: 117 MMHG | TEMPERATURE: 98.4 F | DIASTOLIC BLOOD PRESSURE: 65 MMHG

## 2021-06-28 DIAGNOSIS — E55.9 VITAMIN D DEFICIENCY: ICD-10-CM

## 2021-06-28 DIAGNOSIS — E21.0 PRIMARY HYPERPARATHYROIDISM (HCC): Primary | ICD-10-CM

## 2021-06-28 DIAGNOSIS — E83.52 HYPERCALCEMIA: ICD-10-CM

## 2021-06-28 LAB
BASOPHILS # BLD: 0.1 K/UL (ref 0–0.1)
BASOPHILS NFR BLD: 1 % (ref 0–1)
DIFFERENTIAL METHOD BLD: ABNORMAL
EOSINOPHIL # BLD: 0.2 K/UL (ref 0–0.4)
EOSINOPHIL NFR BLD: 3 % (ref 0–7)
ERYTHROCYTE [DISTWIDTH] IN BLOOD BY AUTOMATED COUNT: 14.6 % (ref 11.5–14.5)
HCT VFR BLD AUTO: 38.7 % (ref 35–47)
HGB BLD-MCNC: 12.5 G/DL (ref 11.5–16)
IMM GRANULOCYTES # BLD AUTO: 0 K/UL (ref 0–0.04)
IMM GRANULOCYTES NFR BLD AUTO: 0 % (ref 0–0.5)
LYMPHOCYTES # BLD: 1.9 K/UL (ref 0.8–3.5)
LYMPHOCYTES NFR BLD: 26 % (ref 12–49)
MCH RBC QN AUTO: 26.8 PG (ref 26–34)
MCHC RBC AUTO-ENTMCNC: 32.3 G/DL (ref 30–36.5)
MCV RBC AUTO: 82.9 FL (ref 80–99)
MONOCYTES # BLD: 0.6 K/UL (ref 0–1)
MONOCYTES NFR BLD: 8 % (ref 5–13)
NEUTS SEG # BLD: 4.6 K/UL (ref 1.8–8)
NEUTS SEG NFR BLD: 62 % (ref 32–75)
NRBC # BLD: 0 K/UL (ref 0–0.01)
NRBC BLD-RTO: 0 PER 100 WBC
PLATELET # BLD AUTO: 277 K/UL (ref 150–400)
PMV BLD AUTO: 12.9 FL (ref 8.9–12.9)
RBC # BLD AUTO: 4.67 M/UL (ref 3.8–5.2)
WBC # BLD AUTO: 7.5 K/UL (ref 3.6–11)

## 2021-06-28 PROCEDURE — 1090F PRES/ABSN URINE INCON ASSESS: CPT | Performed by: INTERNAL MEDICINE

## 2021-06-28 PROCEDURE — G8399 PT W/DXA RESULTS DOCUMENT: HCPCS | Performed by: INTERNAL MEDICINE

## 2021-06-28 PROCEDURE — 36415 COLL VENOUS BLD VENIPUNCTURE: CPT

## 2021-06-28 PROCEDURE — 3017F COLORECTAL CA SCREEN DOC REV: CPT | Performed by: INTERNAL MEDICINE

## 2021-06-28 PROCEDURE — 85025 COMPLETE CBC W/AUTO DIFF WBC: CPT

## 2021-06-28 PROCEDURE — G8510 SCR DEP NEG, NO PLAN REQD: HCPCS | Performed by: INTERNAL MEDICINE

## 2021-06-28 PROCEDURE — G8754 DIAS BP LESS 90: HCPCS | Performed by: INTERNAL MEDICINE

## 2021-06-28 PROCEDURE — 99214 OFFICE O/P EST MOD 30 MIN: CPT | Performed by: INTERNAL MEDICINE

## 2021-06-28 PROCEDURE — G8417 CALC BMI ABV UP PARAM F/U: HCPCS | Performed by: INTERNAL MEDICINE

## 2021-06-28 PROCEDURE — 1101F PT FALLS ASSESS-DOCD LE1/YR: CPT | Performed by: INTERNAL MEDICINE

## 2021-06-28 PROCEDURE — G8427 DOCREV CUR MEDS BY ELIG CLIN: HCPCS | Performed by: INTERNAL MEDICINE

## 2021-06-28 PROCEDURE — G8752 SYS BP LESS 140: HCPCS | Performed by: INTERNAL MEDICINE

## 2021-06-28 PROCEDURE — G8536 NO DOC ELDER MAL SCRN: HCPCS | Performed by: INTERNAL MEDICINE

## 2021-06-28 NOTE — PERIOP NOTES
PFIZER  03/10/21 &03/31/21 COPY PLACED ON CHART     PRE-OPERATIVE INSTRUCTIONS REVIEWED WITH PATIENT. PT GIVEN TIME TO ASK QUESTIONS  PATIENT GIVEN 2-BOTTLE OF CHG SOAP. REVIEWED  PATIENT GIVEN SSI INFECTION FAQ SHEET.        INSTRUCTIONS GIVEN AND REVIEWED ON ADMISSION PROCESS     PT IS A NO BLOOD PRODUCT PT, REFUSAL SIGNED AND IS ON CHART

## 2021-06-28 NOTE — PROGRESS NOTES
Joyce Cobos MD          Patient Information  Name : Lisa Francis 73 y.o.     YOB: 1955         Referred by: Houston Larry MD       Chief Complaint   Patient presents with    Thyroid Problem     parathyroid and calcium       History of present illness:    Lisa Francis is a 73 y.o. female here for follow-up  She has hypercalcemia dating back to 2011, initially was mild, now calcium has been ranging from 11.2-11.5, with elevated PTH. She is on chlorthalidone. 24-hour urine calcium was 456  Creatinine 0.67, , , vitamin D 45  Nuclear medicine parathyroid scan was nonlocalizing  CT neck right parathyroid adenoma  Scheduled for surgery  DEXA 2021: No osteoporosis    Type 2 diabetes: On Metformin        BP Readings from Last 3 Encounters:   06/28/21 137/67   06/14/21 121/60   04/21/21 (!) 150/70       Wt Readings from Last 3 Encounters:   06/28/21 206 lb (93.4 kg)   06/14/21 209 lb 12.8 oz (95.2 kg)   04/21/21 220 lb (99.8 kg)       Past Medical History:   Diagnosis Date    Diabetes ([facility] [address]     Hypertension     Mitral valve disorders(424.0)     Other ill-defined conditions(799.89)     mitral valve regurg    Sleep apnea with use of continuous positive airway pressure (CPAP)     Unspecified adverse effect of anesthesia     difficulty waking       Current Outpatient Medications   Medication Sig    OTHER Black strap malassis    metFORMIN (GLUCOPHAGE) 1,000 mg tablet Take 1,000 mg by mouth two (2) times daily (with meals).  rosuvastatin (CRESTOR) 20 mg tablet Take 1 Tab by mouth nightly.  chlorthalidone (HYGROTEN) 25 mg tablet TAKE 1 TABLET BY MOUTH EVERY DAY    cpap machine kit by Does Not Apply route.  lisinopril (PRINIVIL, ZESTRIL) 10 mg tablet Take 1 Tab by mouth daily.  ascorbic acid, vitamin C, (VITAMIN C) 1,000 mg tablet Take  by mouth daily.  multivitamin (ONE A DAY) tablet Take 1 Tab by mouth daily.     omega-3 fatty acids-vitamin e (FISH OIL) 1,000 mg cap Take 1 Cap by mouth daily. No current facility-administered medications for this visit. Allergies   Allergen Reactions    Other Plant, Animal, Environmental Other (comments)     hayfever         Review of Systems:  - Per HPI    Physical Examination:  Blood pressure 137/67, pulse 67, temperature 96.8 °F (36 °C), height 5' 4\" (1.626 m), weight 206 lb (93.4 kg), SpO2 98 %. - General: pleasant, no distress, good eye contact  - HEENT: no exophthalmos, no periorbital edema, EOMI  - Neck: No visible thyromegaly  - RS: Normal respiratory effort  - Musculoskeletal: no tremors  - Neurological: alert and oriented  - Psychiatric: normal mood and affect  - Skin: Normal color    Data Reviewed:     Lab Results   Component Value Date/Time    Calcium 11.3 (H) 04/21/2021 09:11 AM     Lab Results   Component Value Date/Time    VITAMIN D, 25-HYDROXY 38.4 04/21/2021 09:11 AM       Lab Results   Component Value Date/Time    TSH 0.527 02/22/2018 11:33 AM     Lab Results   Component Value Date/Time    Sodium 144 04/21/2021 09:11 AM    Potassium 5.2 04/21/2021 09:11 AM    Chloride 106 04/21/2021 09:11 AM    CO2 26 04/21/2021 09:11 AM    Anion gap 10 11/19/2010 03:55 AM    Glucose 110 (H) 04/21/2021 09:11 AM    BUN 11 04/21/2021 09:11 AM    Creatinine 0.75 04/21/2021 09:11 AM    BUN/Creatinine ratio 15 04/21/2021 09:11 AM    GFR est AA 97 04/21/2021 09:11 AM    GFR est non-AA 84 04/21/2021 09:11 AM    Calcium 11.3 (H) 04/21/2021 09:11 AM       [] Reviewed labs    Assessment/Plan:   Hypercalcemia  Primary hyperparathyroidism    24 hour urine calcium 456, total volume 2.9 L, urine creatinine 1639mg/dL which makes primary hyperparathyroidism more likely than chlorthalidone induced hypercalcemia in which 24-hour urine calcium will be lower due to hypercalciuria. She was on hydrochlorothiazide before, now on chlorthalidone.   CT neck: Right parathyroid adenoma  Hydration  Discussed the associated risk factors of parathyroidectomy, complications, transient hypocalcemia post surgery    Type 2 diabetes mellitus: On Metformin    Vitamin D deficiency    There are no Patient Instructions on file for this visit. Follow-up and Dispositions    · Return in about 4 weeks (around 7/26/2021). Thank you for allowing me to participate in the care of this patient. Wilfrido Galan MD        Patient Heidi Glaser verbalized understanding   Voice-recognition software was used to generate this report, which may result in some phonetic-based errors in the grammar and contents. Even though attempts were made to correct all the mistakes, some may have been missed and remained in the body of the report.

## 2021-06-29 NOTE — PERIOP NOTES
Faxed PAT testing reports to Dr. Wetzel Nissen. Notified Julia/Dr. Tina Fierro office RE: abnormal HgbA1c 7.0.

## 2021-07-07 ENCOUNTER — TRANSCRIBE ORDER (OUTPATIENT)
Dept: SCHEDULING | Age: 66
End: 2021-07-07

## 2021-07-07 DIAGNOSIS — E21.0 PRIMARY HYPERPARATHYROIDISM (HCC): ICD-10-CM

## 2021-07-07 DIAGNOSIS — D35.1 BENIGN NEOPLASM OF PARATHYROID GLAND: Primary | ICD-10-CM

## 2021-07-09 ENCOUNTER — ANESTHESIA EVENT (OUTPATIENT)
Dept: MEDSURG UNIT | Age: 66
End: 2021-07-09
Payer: MEDICARE

## 2021-07-09 ENCOUNTER — APPOINTMENT (OUTPATIENT)
Dept: NUCLEAR MEDICINE | Age: 66
End: 2021-07-09
Attending: OTOLARYNGOLOGY
Payer: MEDICARE

## 2021-07-09 ENCOUNTER — HOSPITAL ENCOUNTER (OUTPATIENT)
Age: 66
Setting detail: OBSERVATION
Discharge: HOME | End: 2021-07-10
Attending: OTOLARYNGOLOGY | Admitting: OTOLARYNGOLOGY
Payer: MEDICARE

## 2021-07-09 ENCOUNTER — ANESTHESIA (OUTPATIENT)
Dept: MEDSURG UNIT | Age: 66
End: 2021-07-09
Payer: MEDICARE

## 2021-07-09 DIAGNOSIS — E21.0 PRIMARY HYPERPARATHYROIDISM (HCC): ICD-10-CM

## 2021-07-09 DIAGNOSIS — E21.3 HYPERPARATHYROIDISM (HCC): Primary | ICD-10-CM

## 2021-07-09 DIAGNOSIS — D35.1 BENIGN NEOPLASM OF PARATHYROID GLAND: ICD-10-CM

## 2021-07-09 LAB
GLUCOSE BLD STRIP.AUTO-MCNC: 106 MG/DL (ref 65–117)
GLUCOSE BLD STRIP.AUTO-MCNC: 137 MG/DL (ref 65–117)
INTRA-OP PTH, IPTHRT: 33 PG/ML (ref 18.4–88)
INTRA-OP PTH, IPTHRT: 64.9 PG/ML (ref 18.4–88)
PTH-INTACT IO % DIF SERPL: NORMAL %
PTH-INTACT P EXCISION SERPL-MCNC: 17.6 PG/ML (ref 18.4–88)
PTH-INTACT P EXCISION SERPL-MCNC: <6.3 PG/ML (ref 18.4–88)
SERVICE CMNT-IMP: ABNORMAL
SERVICE CMNT-IMP: NORMAL
SPECIMEN DRAWN SERPL: 1250
SPECIMEN DRAWN SERPL: ABNORMAL
SPECIMEN DRAWN SERPL: ABNORMAL
SPECIMEN DRAWN SERPL: NORMAL

## 2021-07-09 PROCEDURE — 99218 HC RM OBSERVATION: CPT

## 2021-07-09 PROCEDURE — 74011000250 HC RX REV CODE- 250: Performed by: OTOLARYNGOLOGY

## 2021-07-09 PROCEDURE — 77030032988 HC TU ET NIM TRIVNTG EMG MEDT -D: Performed by: OTOLARYNGOLOGY

## 2021-07-09 PROCEDURE — 82962 GLUCOSE BLOOD TEST: CPT

## 2021-07-09 PROCEDURE — 77030021052 HC RNG RETRCTR STAY COOP -A: Performed by: OTOLARYNGOLOGY

## 2021-07-09 PROCEDURE — A9500 TC99M SESTAMIBI: HCPCS

## 2021-07-09 PROCEDURE — 2709999900 HC NON-CHARGEABLE SUPPLY: Performed by: OTOLARYNGOLOGY

## 2021-07-09 PROCEDURE — 74011250636 HC RX REV CODE- 250/636: Performed by: NURSE ANESTHETIST, CERTIFIED REGISTERED

## 2021-07-09 PROCEDURE — 74011000250 HC RX REV CODE- 250: Performed by: NURSE ANESTHETIST, CERTIFIED REGISTERED

## 2021-07-09 PROCEDURE — 76030000020 HC AMB SURG 1.5 TO 2 HR INTENSV-TIER 1: Performed by: OTOLARYNGOLOGY

## 2021-07-09 PROCEDURE — 77030020268 HC MISC GENERAL SUPPLY: Performed by: OTOLARYNGOLOGY

## 2021-07-09 PROCEDURE — 77030026438 HC STYL ET INTUB CARD -A: Performed by: NURSE ANESTHETIST, CERTIFIED REGISTERED

## 2021-07-09 PROCEDURE — 83970 ASSAY OF PARATHORMONE: CPT

## 2021-07-09 PROCEDURE — 88305 TISSUE EXAM BY PATHOLOGIST: CPT

## 2021-07-09 PROCEDURE — 77030002933 HC SUT MCRYL J&J -A: Performed by: OTOLARYNGOLOGY

## 2021-07-09 PROCEDURE — 77030014008 HC SPNG HEMSTAT J&J -C: Performed by: OTOLARYNGOLOGY

## 2021-07-09 PROCEDURE — 74011250637 HC RX REV CODE- 250/637: Performed by: ANESTHESIOLOGY

## 2021-07-09 PROCEDURE — 88331 PATH CONSLTJ SURG 1 BLK 1SPC: CPT

## 2021-07-09 PROCEDURE — 36415 COLL VENOUS BLD VENIPUNCTURE: CPT

## 2021-07-09 PROCEDURE — 76210000037 HC AMBSU PH I REC 2 TO 2.5 HR: Performed by: OTOLARYNGOLOGY

## 2021-07-09 PROCEDURE — 77030031753 HC SHR ENDO COAG HARM J&J -E: Performed by: OTOLARYNGOLOGY

## 2021-07-09 PROCEDURE — 74011250637 HC RX REV CODE- 250/637: Performed by: OTOLARYNGOLOGY

## 2021-07-09 PROCEDURE — 76060000063 HC AMB SURG ANES 1.5 TO 2 HR: Performed by: OTOLARYNGOLOGY

## 2021-07-09 PROCEDURE — 74011250636 HC RX REV CODE- 250/636: Performed by: ANESTHESIOLOGY

## 2021-07-09 PROCEDURE — 77030031139 HC SUT VCRL2 J&J -A: Performed by: OTOLARYNGOLOGY

## 2021-07-09 PROCEDURE — 77030008698 HC TU ET REINF MEDT -D: Performed by: NURSE ANESTHETIST, CERTIFIED REGISTERED

## 2021-07-09 PROCEDURE — 77030002916 HC SUT ETHLN J&J -A: Performed by: OTOLARYNGOLOGY

## 2021-07-09 RX ORDER — SODIUM CHLORIDE 0.9 % (FLUSH) 0.9 %
5-40 SYRINGE (ML) INJECTION EVERY 8 HOURS
Status: DISCONTINUED | OUTPATIENT
Start: 2021-07-09 | End: 2021-07-10 | Stop reason: HOSPADM

## 2021-07-09 RX ORDER — FERROUS SULFATE, DRIED 160(50) MG
1 TABLET, EXTENDED RELEASE ORAL EVERY 6 HOURS
Status: DISCONTINUED | OUTPATIENT
Start: 2021-07-09 | End: 2021-07-10 | Stop reason: HOSPADM

## 2021-07-09 RX ORDER — FENTANYL CITRATE 50 UG/ML
50 INJECTION, SOLUTION INTRAMUSCULAR; INTRAVENOUS AS NEEDED
Status: DISCONTINUED | OUTPATIENT
Start: 2021-07-09 | End: 2021-07-09 | Stop reason: HOSPADM

## 2021-07-09 RX ORDER — SUCCINYLCHOLINE CHLORIDE 20 MG/ML
INJECTION INTRAMUSCULAR; INTRAVENOUS AS NEEDED
Status: DISCONTINUED | OUTPATIENT
Start: 2021-07-09 | End: 2021-07-09 | Stop reason: HOSPADM

## 2021-07-09 RX ORDER — OXYCODONE AND ACETAMINOPHEN 5; 325 MG/1; MG/1
1 TABLET ORAL AS NEEDED
Status: DISCONTINUED | OUTPATIENT
Start: 2021-07-09 | End: 2021-07-09 | Stop reason: HOSPADM

## 2021-07-09 RX ORDER — DEXTROSE, SODIUM CHLORIDE, AND POTASSIUM CHLORIDE 5; .45; .15 G/100ML; G/100ML; G/100ML
25 INJECTION INTRAVENOUS CONTINUOUS
Status: DISCONTINUED | OUTPATIENT
Start: 2021-07-09 | End: 2021-07-10 | Stop reason: HOSPADM

## 2021-07-09 RX ORDER — CHLORTHALIDONE 25 MG/1
25 TABLET ORAL DAILY
Status: DISCONTINUED | OUTPATIENT
Start: 2021-07-10 | End: 2021-07-10 | Stop reason: HOSPADM

## 2021-07-09 RX ORDER — MIDAZOLAM HYDROCHLORIDE 1 MG/ML
1 INJECTION, SOLUTION INTRAMUSCULAR; INTRAVENOUS AS NEEDED
Status: DISCONTINUED | OUTPATIENT
Start: 2021-07-09 | End: 2021-07-09 | Stop reason: HOSPADM

## 2021-07-09 RX ORDER — SODIUM CHLORIDE 9 MG/ML
50 INJECTION, SOLUTION INTRAVENOUS CONTINUOUS
Status: DISCONTINUED | OUTPATIENT
Start: 2021-07-09 | End: 2021-07-09 | Stop reason: HOSPADM

## 2021-07-09 RX ORDER — LIDOCAINE HYDROCHLORIDE AND EPINEPHRINE 10; 10 MG/ML; UG/ML
INJECTION, SOLUTION INFILTRATION; PERINEURAL AS NEEDED
Status: DISCONTINUED | OUTPATIENT
Start: 2021-07-09 | End: 2021-07-09 | Stop reason: HOSPADM

## 2021-07-09 RX ORDER — ACETAMINOPHEN 325 MG/1
650 TABLET ORAL
Status: DISCONTINUED | OUTPATIENT
Start: 2021-07-09 | End: 2021-07-10 | Stop reason: HOSPADM

## 2021-07-09 RX ORDER — SODIUM CHLORIDE 0.9 % (FLUSH) 0.9 %
5-40 SYRINGE (ML) INJECTION EVERY 8 HOURS
Status: DISCONTINUED | OUTPATIENT
Start: 2021-07-09 | End: 2021-07-09 | Stop reason: HOSPADM

## 2021-07-09 RX ORDER — FENTANYL CITRATE 50 UG/ML
25 INJECTION, SOLUTION INTRAMUSCULAR; INTRAVENOUS
Status: DISCONTINUED | OUTPATIENT
Start: 2021-07-09 | End: 2021-07-09 | Stop reason: HOSPADM

## 2021-07-09 RX ORDER — HYDROMORPHONE HYDROCHLORIDE 1 MG/ML
0.2 INJECTION, SOLUTION INTRAMUSCULAR; INTRAVENOUS; SUBCUTANEOUS
Status: DISCONTINUED | OUTPATIENT
Start: 2021-07-09 | End: 2021-07-09 | Stop reason: HOSPADM

## 2021-07-09 RX ORDER — SODIUM CHLORIDE 0.9 % (FLUSH) 0.9 %
5-40 SYRINGE (ML) INJECTION AS NEEDED
Status: DISCONTINUED | OUTPATIENT
Start: 2021-07-09 | End: 2021-07-09 | Stop reason: HOSPADM

## 2021-07-09 RX ORDER — ACETAMINOPHEN 325 MG/1
650 TABLET ORAL ONCE
Status: COMPLETED | OUTPATIENT
Start: 2021-07-09 | End: 2021-07-09

## 2021-07-09 RX ORDER — ONDANSETRON 2 MG/ML
4 INJECTION INTRAMUSCULAR; INTRAVENOUS
Status: DISCONTINUED | OUTPATIENT
Start: 2021-07-09 | End: 2021-07-10 | Stop reason: HOSPADM

## 2021-07-09 RX ORDER — ONDANSETRON 2 MG/ML
INJECTION INTRAMUSCULAR; INTRAVENOUS AS NEEDED
Status: DISCONTINUED | OUTPATIENT
Start: 2021-07-09 | End: 2021-07-09 | Stop reason: HOSPADM

## 2021-07-09 RX ORDER — DIPHENHYDRAMINE HYDROCHLORIDE 50 MG/ML
12.5 INJECTION, SOLUTION INTRAMUSCULAR; INTRAVENOUS AS NEEDED
Status: DISCONTINUED | OUTPATIENT
Start: 2021-07-09 | End: 2021-07-09 | Stop reason: HOSPADM

## 2021-07-09 RX ORDER — PROPOFOL 10 MG/ML
INJECTION, EMULSION INTRAVENOUS AS NEEDED
Status: DISCONTINUED | OUTPATIENT
Start: 2021-07-09 | End: 2021-07-09 | Stop reason: HOSPADM

## 2021-07-09 RX ORDER — TETRAKIS(2-METHOXYISOBUTYLISOCYANIDE)COPPER(I) TETRAFLUOROBORATE 1 MG/ML
21.6 INJECTION, POWDER, LYOPHILIZED, FOR SOLUTION INTRAVENOUS
Status: COMPLETED | OUTPATIENT
Start: 2021-07-09 | End: 2021-07-09

## 2021-07-09 RX ORDER — LISINOPRIL 10 MG/1
10 TABLET ORAL DAILY
Status: DISCONTINUED | OUTPATIENT
Start: 2021-07-10 | End: 2021-07-10 | Stop reason: HOSPADM

## 2021-07-09 RX ORDER — SODIUM CHLORIDE, SODIUM LACTATE, POTASSIUM CHLORIDE, CALCIUM CHLORIDE 600; 310; 30; 20 MG/100ML; MG/100ML; MG/100ML; MG/100ML
125 INJECTION, SOLUTION INTRAVENOUS CONTINUOUS
Status: DISCONTINUED | OUTPATIENT
Start: 2021-07-09 | End: 2021-07-09 | Stop reason: HOSPADM

## 2021-07-09 RX ORDER — SODIUM CHLORIDE 9 MG/ML
1000 INJECTION, SOLUTION INTRAVENOUS CONTINUOUS
Status: DISCONTINUED | OUTPATIENT
Start: 2021-07-09 | End: 2021-07-09 | Stop reason: HOSPADM

## 2021-07-09 RX ORDER — METFORMIN HYDROCHLORIDE 500 MG/1
1000 TABLET ORAL 2 TIMES DAILY WITH MEALS
Status: DISCONTINUED | OUTPATIENT
Start: 2021-07-09 | End: 2021-07-10 | Stop reason: HOSPADM

## 2021-07-09 RX ORDER — MORPHINE SULFATE 2 MG/ML
2 INJECTION, SOLUTION INTRAMUSCULAR; INTRAVENOUS
Status: DISCONTINUED | OUTPATIENT
Start: 2021-07-09 | End: 2021-07-09 | Stop reason: HOSPADM

## 2021-07-09 RX ORDER — MIDAZOLAM HYDROCHLORIDE 1 MG/ML
INJECTION, SOLUTION INTRAMUSCULAR; INTRAVENOUS AS NEEDED
Status: DISCONTINUED | OUTPATIENT
Start: 2021-07-09 | End: 2021-07-09 | Stop reason: HOSPADM

## 2021-07-09 RX ORDER — LIDOCAINE HYDROCHLORIDE 20 MG/ML
INJECTION, SOLUTION EPIDURAL; INFILTRATION; INTRACAUDAL; PERINEURAL AS NEEDED
Status: DISCONTINUED | OUTPATIENT
Start: 2021-07-09 | End: 2021-07-09 | Stop reason: HOSPADM

## 2021-07-09 RX ORDER — MIDAZOLAM HYDROCHLORIDE 1 MG/ML
0.5 INJECTION, SOLUTION INTRAMUSCULAR; INTRAVENOUS
Status: DISCONTINUED | OUTPATIENT
Start: 2021-07-09 | End: 2021-07-09 | Stop reason: HOSPADM

## 2021-07-09 RX ORDER — CEFAZOLIN SODIUM 1 G/3ML
INJECTION, POWDER, FOR SOLUTION INTRAMUSCULAR; INTRAVENOUS AS NEEDED
Status: DISCONTINUED | OUTPATIENT
Start: 2021-07-09 | End: 2021-07-09 | Stop reason: HOSPADM

## 2021-07-09 RX ORDER — OXYCODONE AND ACETAMINOPHEN 5; 325 MG/1; MG/1
1 TABLET ORAL
Status: DISCONTINUED | OUTPATIENT
Start: 2021-07-09 | End: 2021-07-10 | Stop reason: HOSPADM

## 2021-07-09 RX ORDER — CALCITRIOL 0.25 UG/1
0.25 CAPSULE ORAL DAILY
Status: DISCONTINUED | OUTPATIENT
Start: 2021-07-10 | End: 2021-07-10 | Stop reason: HOSPADM

## 2021-07-09 RX ORDER — DEXAMETHASONE SODIUM PHOSPHATE 4 MG/ML
INJECTION, SOLUTION INTRA-ARTICULAR; INTRALESIONAL; INTRAMUSCULAR; INTRAVENOUS; SOFT TISSUE AS NEEDED
Status: DISCONTINUED | OUTPATIENT
Start: 2021-07-09 | End: 2021-07-09 | Stop reason: HOSPADM

## 2021-07-09 RX ORDER — LIDOCAINE HYDROCHLORIDE 10 MG/ML
0.1 INJECTION, SOLUTION EPIDURAL; INFILTRATION; INTRACAUDAL; PERINEURAL AS NEEDED
Status: DISCONTINUED | OUTPATIENT
Start: 2021-07-09 | End: 2021-07-09 | Stop reason: HOSPADM

## 2021-07-09 RX ORDER — OXYCODONE AND ACETAMINOPHEN 10; 325 MG/1; MG/1
1 TABLET ORAL
Status: DISCONTINUED | OUTPATIENT
Start: 2021-07-09 | End: 2021-07-10 | Stop reason: HOSPADM

## 2021-07-09 RX ORDER — EPHEDRINE SULFATE/0.9% NACL/PF 50 MG/5 ML
5 SYRINGE (ML) INTRAVENOUS AS NEEDED
Status: DISCONTINUED | OUTPATIENT
Start: 2021-07-09 | End: 2021-07-09 | Stop reason: HOSPADM

## 2021-07-09 RX ORDER — ROCURONIUM BROMIDE 10 MG/ML
INJECTION, SOLUTION INTRAVENOUS AS NEEDED
Status: DISCONTINUED | OUTPATIENT
Start: 2021-07-09 | End: 2021-07-09 | Stop reason: HOSPADM

## 2021-07-09 RX ORDER — FENTANYL CITRATE 50 UG/ML
INJECTION, SOLUTION INTRAMUSCULAR; INTRAVENOUS AS NEEDED
Status: DISCONTINUED | OUTPATIENT
Start: 2021-07-09 | End: 2021-07-09 | Stop reason: HOSPADM

## 2021-07-09 RX ORDER — ONDANSETRON 2 MG/ML
4 INJECTION INTRAMUSCULAR; INTRAVENOUS AS NEEDED
Status: DISCONTINUED | OUTPATIENT
Start: 2021-07-09 | End: 2021-07-09 | Stop reason: HOSPADM

## 2021-07-09 RX ORDER — SODIUM CHLORIDE 0.9 % (FLUSH) 0.9 %
5-40 SYRINGE (ML) INJECTION AS NEEDED
Status: DISCONTINUED | OUTPATIENT
Start: 2021-07-09 | End: 2021-07-10 | Stop reason: HOSPADM

## 2021-07-09 RX ADMIN — ACETAMINOPHEN 650 MG: 325 TABLET ORAL at 13:19

## 2021-07-09 RX ADMIN — PROPOFOL 100 MG: 10 INJECTION, EMULSION INTRAVENOUS at 14:28

## 2021-07-09 RX ADMIN — PROPOFOL 50 MG: 10 INJECTION, EMULSION INTRAVENOUS at 14:20

## 2021-07-09 RX ADMIN — METFORMIN HYDROCHLORIDE 1000 MG: 500 TABLET ORAL at 18:43

## 2021-07-09 RX ADMIN — CEFAZOLIN 2 G: 330 INJECTION, POWDER, FOR SOLUTION INTRAMUSCULAR; INTRAVENOUS at 14:09

## 2021-07-09 RX ADMIN — FENTANYL CITRATE 50 MCG: 50 INJECTION, SOLUTION INTRAMUSCULAR; INTRAVENOUS at 13:55

## 2021-07-09 RX ADMIN — ROCURONIUM BROMIDE 5 MG: 10 SOLUTION INTRAVENOUS at 13:55

## 2021-07-09 RX ADMIN — CALCIUM CARBONATE-VITAMIN D TAB 500 MG-200 UNIT 1 TABLET: 500-200 TAB at 18:43

## 2021-07-09 RX ADMIN — MIDAZOLAM 2 MG: 1 INJECTION INTRAMUSCULAR; INTRAVENOUS at 13:47

## 2021-07-09 RX ADMIN — SUCCINYLCHOLINE CHLORIDE 140 MG: 20 INJECTION, SOLUTION INTRAMUSCULAR; INTRAVENOUS at 13:55

## 2021-07-09 RX ADMIN — PROPOFOL 150 MG: 10 INJECTION, EMULSION INTRAVENOUS at 13:55

## 2021-07-09 RX ADMIN — Medication 10 ML: at 18:44

## 2021-07-09 RX ADMIN — Medication 10 ML: at 20:25

## 2021-07-09 RX ADMIN — TETRAKIS(2-METHOXYISOBUTYLISOCYANIDE)COPPER(I) TETRAFLUOROBORATE 21.6 MILLICURIE: 1 INJECTION, POWDER, LYOPHILIZED, FOR SOLUTION INTRAVENOUS at 10:40

## 2021-07-09 RX ADMIN — OXYCODONE AND ACETAMINOPHEN 1 TABLET: 10; 325 TABLET ORAL at 18:50

## 2021-07-09 RX ADMIN — DEXAMETHASONE SODIUM PHOSPHATE 8 MG: 4 INJECTION, SOLUTION INTRAMUSCULAR; INTRAVENOUS at 14:05

## 2021-07-09 RX ADMIN — LIDOCAINE HYDROCHLORIDE 60 MG: 20 INJECTION, SOLUTION EPIDURAL; INFILTRATION; INTRACAUDAL; PERINEURAL at 13:55

## 2021-07-09 RX ADMIN — SODIUM CHLORIDE, POTASSIUM CHLORIDE, SODIUM LACTATE AND CALCIUM CHLORIDE: 600; 310; 30; 20 INJECTION, SOLUTION INTRAVENOUS at 13:37

## 2021-07-09 RX ADMIN — ONDANSETRON HYDROCHLORIDE 4 MG: 2 INJECTION, SOLUTION INTRAMUSCULAR; INTRAVENOUS at 14:55

## 2021-07-09 RX ADMIN — FENTANYL CITRATE 50 MCG: 50 INJECTION, SOLUTION INTRAMUSCULAR; INTRAVENOUS at 14:33

## 2021-07-09 RX ADMIN — PROPOFOL 50 MG: 10 INJECTION, EMULSION INTRAVENOUS at 14:32

## 2021-07-09 NOTE — ROUTINE PROCESS
Patient: Jennelle Epley MRN: [id number]  SSN: [id number]   YOB: 1955  Age: 73 y.o. Sex: female     Patient is status post Procedure(s): MINIMALLY INVASIVE RADIOGUIDED PARATHYROID EXPLORATION WITH IOPTH (1030), PTEYE. Surgeon(s) and Role:     * Selma Jones MD - Primary     Terence Vincent MD - Assisting    Local/Dose/Irrigation: [id number]                Peripheral IV 07/09/21 Right Wrist (Active)   Site Assessment Clean, dry, & intact 07/09/21 1320   Phlebitis Assessment 0 07/09/21 1320   Dressing Status Clean, dry, & intact 07/09/21 1320   Dressing Type Tape;Transparent 07/09/21 1320   Hub Color/Line Status Blue; Infusing 07/09/21 1320            Airway - Endotracheal Tube 07/09/21 Oral (Active)                   Dressing/Packing:  Incision 07/09/21 Neck-Dressing/Treatment: Steri-strips (07/09/21 1435)    Splint/Cast:  ]    Other:

## 2021-07-09 NOTE — ANESTHESIA PREPROCEDURE EVALUATION
Relevant Problems   RESPIRATORY SYSTEM   (+) Sleep apnea      CARDIOVASCULAR   (+) Hypertension   (+) Mitral valve regurgitation      ENDOCRINE   (+) Diabetes (HCC)   (+) Severe obesity (BMI 35.0-39. 9) with comorbidity ([address]       Anesthetic History   No history of anesthetic complications            Review of Systems / Medical History  Patient summary reviewed, nursing notes reviewed and pertinent labs reviewed    Pulmonary  Within defined limits      Sleep apnea           Neuro/Psych   Within defined limits           Cardiovascular  Within defined limits  Hypertension                   GI/Hepatic/Renal  Within defined limits              Endo/Other  Within defined limits  Diabetes    Morbid obesity     Other Findings              Physical Exam    Airway  Mallampati: I  TM Distance: > 6 cm  Neck ROM: normal range of motion   Mouth opening: Normal     Cardiovascular  Regular rate and rhythm,  S1 and S2 normal,  no murmur, click, rub, or gallop             Dental  No notable dental hx       Pulmonary  Breath sounds clear to auscultation               Abdominal  GI exam deferred       Other Findings            Anesthetic Plan    ASA: 3  Anesthesia type: general          Induction: Intravenous  Anesthetic plan and risks discussed with: Patient

## 2021-07-09 NOTE — ANESTHESIA POSTPROCEDURE EVALUATION
Procedure(s): MINIMALLY INVASIVE RADIOGUIDED PARATHYROID EXPLORATION WITH IOPTH (1030), PTEYE.    general    Anesthesia Post Evaluation      Multimodal analgesia: multimodal analgesia used between 6 hours prior to anesthesia start to PACU discharge  Patient location during evaluation: PACU  Patient participation: complete - patient participated  Level of consciousness: awake  Pain score: 2  Pain management: adequate  Airway patency: patent  Anesthetic complications: no  Cardiovascular status: acceptable  Respiratory status: acceptable  Hydration status: acceptable  Comments: I have evaluated the patient and meets criteria for discharge from PACU. Colby Ferguson MD  Post anesthesia nausea and vomiting:  controlled  Final Post Anesthesia Temperature Assessment:  Normothermia (36.0-37.5 degrees C)      INITIAL Post-op Vital signs:   Vitals Value Taken Time   /62 07/09/21 1600   Temp 36.7 °C (98.1 °F) 07/09/21 1551   Pulse 57 07/09/21 1602   Resp 11 07/09/21 1602   SpO2 100 % 07/09/21 1602   Vitals shown include unvalidated device data.

## 2021-07-09 NOTE — OP NOTES
Date of Surgery: 09 July 2021  Pre-operative Diagnosis: Primary Hyperparathyroidism  Post-operative Diagnosis: Primary Hyperparathyroidism  Procedure(s):   Neck exploration   Radioguided parathyroid surgery  Parathyroidectomy - resection of RIGHT SUPERIOR parathyroid adenoma  Parathyroidectomy - resection of LEFT INFERIOR parathyroid adenoma  Laryngeal nerve monitoring  Surgeon(s) and Role:   Panel 1:   * Ryan Coulter MD - Co-surgeon   * Domincik Knight MD - Co-surgeon   Anesthesia: General +6 ml of Local (50:50 mix of 1% LIDO + EPI)   Urine output: Not documented   Estimated Blood Loss: 2 ml    IVF: 500 ml   Drains: None   Pre-operative iPTH = 64.9 pg/ml   Patient in room at 1349 hours   Antibiotic prophylaxis ANCEF 2 grams given at 1409 hours   Beta blocker not indicated   Pre-prep time out: 1408 hours   Prayer at 1415 hours   Time out for Surgery at 1415 hours   (Correct patient, operative site and procedure confirmed, along with having the necessary equipment on hand to perform the operation safely)   Start of surgery at 1415 hours   End of surgery at 1455 hours   VTE prophylaxis with bilateral thigh high LORRAINE hose and bilateral lower extremity compression devices   Pressure points padded   Sponge, sharp and instrument count: Correct   History: 29-year-old female with symptomatic biochemically confirmed primary hyperparathyroidism (hypercalcemia with non-suppressed serum PTH). Sestamibi scan is non-localizing. Ultrasound is non-localizing. CT Scan is localizing to the right side of the neck for abnormal parathyroid gland (0.5 x 0.9 x 1.3 cm enhancing nodule on the right lateral to the esophagus posterior to the mid thyroid level suspicious for parathyroid adenoma).     Serum Calcium 11.3 mg/dl  Serum PTH 45 pg/ml  Serum 25-OH-Vitamin D 38.4 ng/ml  Urine Calcium 438 mg/24hr    No family history of endocrine malignancy  No personal history of endocrine malignancy    No prior radiation exposure    06/28/2021   Dr. Rena Nichols Assessment/Plan:     Hypercalcemia dating back to 2011, initially was mild, now calcium has been ranging from 11.2-11.5, with elevated PTH. Primary hyperparathyroidism  Calcium has been increasing    24-hour urine calcium 456, total volume 2.9 L, urine creatinine 1639mg/dL, which makes primary hyperparathyroidism more likely than chlorthalidone induced hypercalcemia in which 24-hour urine calcium will be lower due to hypercalciuria. She was on hydrochlorothiazide before, now on chlorthalidone. Discussed therapeutic options, criteria for surgery. She is interested in surgery. Parathyroid scan, neck ultrasound  Hydration  Check vitamin D and replete it for bone health and also to prevent severe hypocalcemia post-surgery. Discussed the associated risk factors of parathyroidectomy, complications, transient hypocalcemia post-surgery. Type 2 diabetes mellitus: On Metformin  Vitamin D deficiency    04/21/2021  EXAM: NM PARATHYROID SCAN  INDICATION: Primary hyperparathyroidism. COMPARISON: None. CORRELATIVE IMAGING STUDIES: None  TRACER: 20 mCi Tc 99m sestamibi. TECHNIQUE: Imaging of the neck and chest was performed in the anterior projection following the uneventful intravenous administration of Tc 99m sestamibi. Delayed images of the chest and neck were also obtained. FINDINGS: The initial images demonstrate physiologic tracer uptake in the salivary glands, thyroid, and myocardium. No abnormal activity is evident. The delayed images demonstrate no abnormal tracer activity in the neck or chest to suggest parathyroid adenoma. IMPRESSION:  No evidence of parathyroid adenoma. 04/21/2021  INDICATION: Primary hyperparathyroidism  FINDINGS: Routine ultrasound imaging of the thyroid gland was performed. The right thyroid lobe measures 5.1 x 1.5 x 1.6 cm. The left thyroid lobe measures 5.1 x 1.3 x 1.4 cm. The thyroid isthmus measures 5 mm in thickness.    The thyroid gland is homogeneous in echogenicity, with no focal nodule. No parathyroid  adenoma or cervical lymphadenopathy is evident. IMPRESSION:  Normal thyroid gland. No visualized parathyroid adenoma. 05/14/2021  INDICATION: Benign neoplasm of parathyroid gland / PTH PROTOCOL  COMPARISON: None  TECHNIQUE:  Axial neck CT was performed after the uneventful administration of  IV contrast. Sagittal and coronal reformats were generated. CT dose reduction was achieved through use of a standardized protocol tailored for this examination and automatic exposure control for dose modulation. CONTRAST: 100 mL Isovue 370  FINDINGS:  Main pulmonary artery is borderline enlarged measuring 3.2 cm  NASOPHARYNX: Normal.  SUPRAHYOID NECK: Normal oropharynx, oral cavity, parapharyngeal space, and  retropharyngeal space. INFRAHYOID NECK: Normal larynx, hypopharynx and supraglottis. PAROTID GLANDS: Normal.  SUBMANDIBULAR GLANDS: Normal.  THYROID GLAND: Normal. No nodule. LYMPH NODES: None enlarged by CT size criteria . LUNG APICES: Clear. OSSEOUS STRUCTURES: No destructive bone lesion. There are degenerative changes  mid to lower cervical spine  ADDITIONAL COMMENTS: There are areas of mucosal thickening in the maxillary  sinuses left greater than right. There is a 0.5 x 0.9 x 1.3 cm enhancing nodule on the right lateral to the  esophagus posterior to the mid thyroid level suspicious for parathyroid adenoma. IMPRESSION:   1. There is a 0.5 x 0.9 x 1.3 cm enhancing nodule on the right lateral to the  esophagus at the level of the mid right thyroid gland suspicious for parathyroid  adenoma. Pre-operative Sestamibi Scan done prior to operation is non-localizing.   Procedure (s) performed:   Neck exploration   Radioguided parathyroid surgery  Parathyroidectomy - resection of RIGHT SUPERIOR parathyroid adenoma  Parathyroidectomy - resection of LEFT INFERIOR parathyroid adenoma  Laryngeal nerve monitoring   Findings:   Abnormal parathyroid gland identified - grossly consistent with adenoma in the RIGHT SUPERIOR anatomical location. This adenoma was large (42 x 10 mm), elongated, multilobulated with abundant fatty tissue and the resected specimen likely included the right inferior parathyroid gland. It was completely excised and submitted intact to pathology. Frozen section: hypercellular parathyroid tissue (1,580 mg in weight)    Clinically normal left superior parathyroid gland (6 x 5 mm); no biopsy obtained. Abnormal parathyroid gland identified - grossly consistent with adenoma in the LEFT INFERIOR anatomical location. It was completely excised and submitted intact to pathology. No findings suspicious for malignancy. No palpable central or lateral neck adenopathy. A diligent search of the central and both lateral areas of the neck was unrevealing, specifically there was no palpable adenopathy in either the right or left lateral (Level II, III, IV) neck, or the central compartment (Level VI and VII). Right lobe measures approximately 5.1 x 1.6 cm. Left lobe measures approximately 5.1 x 1.4 cm. Isthmus measures 5 mm    Both right and left recurrent laryngeal nerves were identified and carefully preserved throughout the entire course of the operation and confirmed to be structurally and functionally intact. The structural and functional integrity of the left and right recurrent laryngeal nerves was confirmed with the nerve stimulator (EnzySurge System), as prominent audible signal was attained when both branches of the left and right recurrent laryngeal nerves were stimulated. Excellent hemostasis confirmed at end of operation. Operation:   The patient was escorted to the operating room. Upon arrival to the operative room, the patient, procedure, and operative site were confirmed via a pre-operative time-out. All were in agreement.    The patient was placed in the supine position and general anesthesia induced without incident using a NIM endotracheal tube for the purpose of laryngeal nerve monitoring. Prophylactic antibiotic (ANCEF 2 grams IVPB) was administered prior to the procedure. Beta blocker not indicated. With the patient in the supine position the arms were tucked, a pillow was placed behind the knees and the heels padded; the neck was slightly extended, and operating table elevated to 30 degrees. Roll was placed behind the scapulae to create mild degree of neck extension. Pressure ulcer prophylaxis was in effect with pressure point padding. VTE prophylaxis was also in effect with LORRAINE hose and lower extremity mechanical compression devices. Sterile anterior neck prep (Chlorhexidine - alcohol) and drape. We prayed for our patient and we repeated the TIME OUT prior to commencing the operation to confirm the    correct patient,    correct operative site,    correct operative procedure, and    necessary equipment on hand to conduct the operation safely. Local anesthesia (6 ml) infiltrated subcutaneously at site of planned anterior cervical skin incision. Pre-operative serum iPTH level was 64.9 pg/ml. A 7 cm, anterior cervical incision made. Limited sub-platysmal flaps were created. [employer] Navigator GPS radio-immuno-guided surgery device was set on Tc99m and 100X. Strap musculature was  in the midline. A dissection plane was developed posterior to the right neck strap musculature using cautery and facilitated by atraumatic [facility] retractors. [place] was used to displace the right thyroid lobe anteromedially as the right lateral thyroid recess was developed using electrocautery, and then gentle blunt dissection using a Kitner dissector. The fibroareolar floor was opened with electrocautery that provided access to the right posterior neck. We began the search for the right superior parathyroid gland by dissecting.    We sought to identify the key structures for exposure of the right superior parathyroid gland -the arteriovenous band situated immediately cephalad to where the right middle thyroid vein is situated. Dissection was performed using a combination of gentle blunt Kitner dissection as well as the tip of a right-angle clamp. Our dissection was focused on the andreia-esophageal region as we proceeded cephalad with our dissection at the level of the middle thyroid vein and mid thyroid gland, based on the pre-operative CT finding: 0.5 x 0.9 x 1.3 cm enhancing nodule on the right lateral to the esophagus posterior to the mid thyroid level suspicious for parathyroid adenoma. The right superior parathyroid gland was identified and found to be clinically abnormal in size and appearance. This adenoma was large (42 x 10 mm), elongated, multilobulated with abundant fatty tissue, extending from the periesophageal region, posterolateral to the right recurrent laryngeal nerve to the region anteromedial to the right recurrent laryngeal nerve; this conglomerate of parathyroid glandular and adipose tissue likely included the right inferior parathyroid gland. We took care not to disrupt the parathyroid gland during dissection, mobilization, and complete evaluation of the abnormal appearing right superior parathyroid gland. Diligent search for the right inferior parathyroid gland was unrevealing as it was most likely contained within the right paraesophageal conglomerate of parathyroid glandular and adipose tissue extending from posterolateral to anteromedial to the right recurrent laryngeal nerve. ---  Attention was then directed to the left neck. A dissection plane was developed posterior to the left strap musculature using cautery and facilitated by atraumatic retractors. Gillermina Dolphin was used to displace the left thyroid lobe as the left lateral thyroid recess was developed using electrocautery, and then gentle blunt dissection using the Kitner dissector.    The fibroareolar floor in the left posterior neck was opened with electrocautery that provided access to the left andreia-esophageal space. We sought to identify the key structures for exposure of the left superior parathyroid gland -the arteriovenous band situated immediately cephalad to where the left middle thyroid vein is situated, and posterior and lateral to the left recurrent laryngeal nerve. The superior parathyroid gland is usually encountered at the Ligament of Grant dorsolateral to the recurrent nerve. The superior parathyroid gland originates from the 4th pharyngeal pouch and is more posterior in position relative to the more anteriorly situated inferior parathyroid gland. The inferior parathyroid gland is usually encountered anterior and medial to the recurrent nerve, inferior to where the recurrent nerve crosses the inferior thyroid artery. The inferior parathyroid gland originates from the 3rd pharyngeal pouch. Typically (~75% of cases), the superior parathyroid gland is situated near the posterior aspect of the thyroid gland, at the level of the cricothyroid junction, in proximity to where the recurrent laryngeal nerve enters the larynx. The superior parathyroid gland is consistently situated dorsal/posterior and lateral to the recurrent laryngeal nerve. In a smaller percentage of patients (~24% of cases) the superior parathyroid gland is adjacent to the superior pole of the thyroid gland. In an exceedingly small percentage of patients (~1%), the superior parathyroid gland is in a retro-pharyngeal, para-esophageal, retro-esophageal, or posterior-superior mediastinal location. Rarely is the superior parathyroid gland situated within the substance of the superior or lateral pole of the thyroid gland. The left superior parathyroid gland was identified and found to be clinically normal in size and appearance (6 x 5 mm in size) - soft, tan, flattened, ovoid and smoothly encapsulated.   We took care not to disrupt the left superior parathyroid gland during dissection, mobilization, and complete evaluation of this parathyroid gland. The normal appearing left superior parathyroid gland was not biopsied. ---   We began the search for the left inferior parathyroid gland by dissecting gently low into the thyro-thymic ligament and apical thymus, and then proceeding cephalad with the dissection. The anatomic location of the inferior parathyroid gland is much more variable than that of the superior parathyroid gland, given the longer and more variable embryological migratory path of the 3rd pharyngeal pouch with the thymus. The inferior parathyroid gland may be embedded within the thymus, thyroid gland, the anterior or posterior mediastinum. The most common location we find the inferior parathyroid gland (~40% of the time) is in proximity to the posterior thyroid capsule at the level of the 1st tracheal ring, in proximity to the inferior thyroid artery - anterior and medial to the recurrent nerve, inferior to where the recurrent nerve crosses the inferior thyroid artery. When we do not find the inferior parathyroid gland in this location we search for other possible anatomic locations - thyro-thymic ligament, within the substance of the thymus near the level of the thoracic inlet (in ~40% of cases the inferior parathyroid gland is found in this location). Other potential anatomic locations for the inferior parathyroid gland include lateral to the thyroid gland (15%), or in atypical or ectopic locations (carotid sheath or mid-thyroid level lateral to the sheath, or deep within the thyroid gland - 2% of cases). When not identified during operation, post-operative imaging is performed with persistent or recurrent hyperparathyroidism to search for the abnormal inferior parathyroid gland or in rare cases a 5th parathyroid gland.    Anatomic areas assessed during post-operative imaging include the anterior or posterior mediastinum, posterior to the trachea and anterior to the esophagus, or posterior to the esophagus, and at or above the angle of the mandible (to assess for an undescended parathyroid gland). Dissection was performed using a combination of gentle blunt Kitner dissection as well as the tip of a right-angle clamp to identify and expose an abnormal-appearing left inferior parathyroid gland (13 x 8 mm in size), grossly consistent with parathyroid adenoma. We took care not to disrupt the left inferior parathyroid gland during dissection, mobilization, and complete evaluation of this abnormal appearing parathyroid gland. ---   We re-directed our attention to the right neck. We took care not to disrupt the parathyroid gland during dissection, mobilization, and complete evaluation of the right superior parathyroid gland. The right superior parathyroid gland adenoma was excised (42 x 10 mm in size and 1580 grams in weight with ex vivo counts of 294 pg/mg/min and background count of 28 pg/mg/min; i.e. hyperactive parathyroid), and submitted to frozen section, which was confirmatory - hypercellular parathyroid tissue. This adenoma was large (42 x 10 mm), elongated, multilobulated with abundant fatty tissue and the resected specimen likely included the right inferior parathyroid gland. It was completely excised and submitted intact to pathology. As our careful search for the right inferior parathyroid gland was unrevealing, we believe that it was most likely contained within the right paraesophageal conglomerate of parathyroid glandular and adipose tissue extending from posterolateral to anteromedial to the right recurrent laryngeal nerve. We re-directed our attention to the left neck. We took care not to disrupt the parathyroid gland during dissection, mobilization, and complete evaluation of the left inferior parathyroid gland.    The left inferior parathyroid gland adenoma was excised (13 x 8 mm in size and 340 grams in weight with ex vivo counts of 77 pg/mg/min and background count of 28 pg/mg/min; i.e. hyperactive parathyroid), and submitted to frozen section, which was confirmatory - cellular parathyroid tissue. Blood was drawn after the excision of the right superior parathyroid adenoma - serum iPTH 10 minutes following right superior parathyroid adenoma resection was 33.0 pg/ml, and after resection of the left inferior parathyroid adenoma resection it was 17.6 pg/ml. ---  Throughout our dissection on both sides of the neck we oriented on the recurrent laryngeal nerves and kept the dissection on the capsule of all four parathyroid glands, taking great care not to rupture the parathyroid gland capsule. We made certain to expose the entire parathyroid gland for each one identified to assess the gland fully. As we did on the left, during the dissection on the right we took care to maintain the encountered recurrent laryngeal nerve out of harms way. With one normal appearing parathyroid gland identified and carefully preserved (left superior parathyroid gland), and two abnormal parathyroid glands excised (right superior and left inferior parathyroid adenomas, with suspected right inferior parathyroid gland in the resected right superior parathyroid paraesophageal gland and adipose tissue conglomerate that extended from posterolateral to anteromedial to the right recurrent laryngeal nerve), and with normalization of serum iPTH after removal of the two parathyroid adenomas (right superior and left inferior parathyroid adenomas), we elected to conclude the operation. To summarize: post-resection of the both right superior and left inferior parathyroid adenoma resection, serum iPTH level dropped from 64.9 pg/ml to 17.6 pg/ml, biochemically consistent with curative resection according to our pre-specified criteria:   1. > 50% drop in serum iPTH from pre-operative baseline, and   2. drop of serum iPTH into the normal range.    The structural and functional integrity of the left and right recurrent laryngeal nerves was confirmed with the nerve stimulator (NIM System), as a loud audible signal was attained when both branches of the left and right recurrent laryngeal nerves were stimulated. A diligent search of the central and both lateral areas of the neck was unrevealing, specifically there was no palpable adenopathy in either the right or left lateral (Level II, III, IV) neck, or the central compartment (Level VI and VII)   Excellent hemostasis was confirmed. The operative site was irrigated with normal saline and excellent hemostasis confirmed. **   An adjunctive tool, parathyroid detection system was utilized to confirm the surgeon-visualized parathyroid glands throughout the operation. The PTeye parathyroid detection system uses laser probe to evoke a fluorescent response from and confirm parathyroid tissue. Baseline PTeye was 29, with confirmatory detection ratio of   3.0 for the right superior parathyroid gland,  3.2 for the left superior parathyroid gland,  10.0 for the left inferior parathyroid gland. **  Excellent hemostasis in the operative field was re-confirmed under repeated Valsalva maneuver. The operative field was irrigated and hemostasis once again confirmed. The strap musculature (sternothyroid and sternohyoid muscles) was re-approximated in the midline with running 3-0 Vicryl suture. The platysma muscle was reconstituted with running absorbable (3-0 Vicryl) suture   The wound was irrigated with saline. The skin closure was completed with running subcuticular 5-0 monocryl suture and transversely oriented Steri-strips applied to the closed surgical incision. This was an uncomplicated operation with minimal blood loss. The patient was extubated without incident and escorted to the PACU in stable condition with aspiration precautions in effect   Complications: None   Implants: None   Disposition:    To PACU extubated and in stable condition with aspiration precautions in effect   Cassia Lawrence MD JAMES FACS  Annette Amato MD

## 2021-07-09 NOTE — PROGRESS NOTES
Patient interviewed and examined    Appears comfortable, sitting upright in bed    Normal voice - no dysphonia, dysarthria or hoarseness    Tolerating oral intake    Alert and fully oriented  Vital signs stable  T 98.1  HR 58  RR 16  /81  SpO2 96% on room air    Anterior cervical incision clean, dry, intact with steri strips in place  No signs of operative site hematome    Doing well on day of surgery s/p neck exploration, radioguided parathyroid surgery and resection of right superior and left inferior parathyroid adenomas    Details of operation provided to patient and all of her questions were answered to her stated satisfaction    Continue to monitor serum Ca/Mg/Phos  Continue Calcium and Vit D replacement  Continue aspiration precautions and overnight observation    Jose Antonio Alvarez MD [facility]

## 2021-07-09 NOTE — BRIEF OP NOTE
Brief Postoperative Note    Patient: Lars Cai  YOB: 1955  MRN: [id number]  Date of Surgery: 09 July 2021  Pre-operative Diagnosis: Primary Hyperparathyroidism  Post-operative Diagnosis: Primary Hyperparathyroidism  Procedure(s):   Neck exploration   Radioguided parathyroid surgery  Parathyroidectomy - resection of RIGHT SUPERIOR parathyroid adenoma  Parathyroidectomy - resection of LEFT INFERIOR parathyroid adenoma  Laryngeal nerve monitoring  Surgeon(s) and Role:   Panel 1:   * Neena Doshi MD - Co-surgeon   * Ling Craft MD - Co-surgeon   Anesthesia: General +6 ml of Local (50:50 mix of 1% LIDO + EPI)   Urine output: Not documented   Estimated Blood Loss: 2 ml    IVF: 500 ml   Drains: None   Pre-operative iPTH = 64.9 pg/ml   Patient in room at 1349 hours   Antibiotic prophylaxis ANCEF 2 grams given at 1409 hours   Beta blocker not indicated   Pre-prep time out: 1408 hours   Prayer at 1415 hours   Time out for Surgery at 1415 hours   (Correct patient, operative site and procedure confirmed, along with having the necessary equipment on hand to perform the operation safely)   Start of surgery at 1415 hours   End of surgery at 1455 hours   VTE prophylaxis with bilateral thigh high LORRAINE hose and bilateral lower extremity compression devices   Pressure points padded   Sponge, sharp and instrument count: Correct   History: 30-year-old female with symptomatic biochemically confirmed primary hyperparathyroidism (hypercalcemia with non-suppressed serum PTH). Sestamibi scan is non-localizing. Ultrasound is non-localizing. CT Scan is localizing to the right side of the neck for abnormal parathyroid gland (0.5 x 0.9 x 1.3 cm enhancing nodule on the right lateral to the esophagus posterior to the mid thyroid level suspicious for parathyroid adenoma).     Serum Calcium 11.3 mg/dl  Serum PTH 45 pg/ml  Serum 25-OH-Vitamin D 38.4 ng/ml  Urine Calcium 438 mg/24hr    No family history of endocrine malignancy  No personal history of endocrine malignancy    No prior radiation exposure    06/28/2021   Dr. Olena Jacob Assessment/Plan:     Hypercalcemia dating back to 2011, initially was mild, now calcium has been ranging from 11.2-11.5, with elevated PTH. Primary hyperparathyroidism  Calcium has been increasing    24-hour urine calcium 456, total volume 2.9 L, urine creatinine 1639mg/dL, which makes primary hyperparathyroidism more likely than chlorthalidone induced hypercalcemia in which 24-hour urine calcium will be lower due to hypercalciuria. She was on hydrochlorothiazide before, now on chlorthalidone. Discussed therapeutic options, criteria for surgery. She is interested in surgery. Parathyroid scan, neck ultrasound  Hydration  Check vitamin D and replete it for bone health and also to prevent severe hypocalcemia post-surgery. Discussed the associated risk factors of parathyroidectomy, complications, transient hypocalcemia post-surgery. Type 2 diabetes mellitus: On Metformin  Vitamin D deficiency    04/21/2021  EXAM: NM PARATHYROID SCAN  INDICATION: Primary hyperparathyroidism. COMPARISON: None. CORRELATIVE IMAGING STUDIES: None  TRACER: 20 mCi Tc 99m sestamibi. TECHNIQUE: Imaging of the neck and chest was performed in the anterior projection following the uneventful intravenous administration of Tc 99m sestamibi. Delayed images of the chest and neck were also obtained. FINDINGS: The initial images demonstrate physiologic tracer uptake in the salivary glands, thyroid, and myocardium. No abnormal activity is evident. The delayed images demonstrate no abnormal tracer activity in the neck or chest to suggest parathyroid adenoma. IMPRESSION:  No evidence of parathyroid adenoma. 04/21/2021  INDICATION: Primary hyperparathyroidism  FINDINGS: Routine ultrasound imaging of the thyroid gland was performed. The right thyroid lobe measures 5.1 x 1.5 x 1.6 cm.     The left thyroid lobe measures 5.1 x 1.3 x 1.4 cm. The thyroid isthmus measures 5 mm in thickness. The thyroid gland is homogeneous in echogenicity, with no focal nodule. No parathyroid  adenoma or cervical lymphadenopathy is evident. IMPRESSION:  Normal thyroid gland. No visualized parathyroid adenoma. 05/14/2021  INDICATION: Benign neoplasm of parathyroid gland / PTH PROTOCOL  COMPARISON: None  TECHNIQUE:  Axial neck CT was performed after the uneventful administration of  IV contrast. Sagittal and coronal reformats were generated. CT dose reduction was achieved through use of a standardized protocol tailored for this examination and automatic exposure control for dose modulation. CONTRAST: 100 mL Isovue 370  FINDINGS:  Main pulmonary artery is borderline enlarged measuring 3.2 cm  NASOPHARYNX: Normal.  SUPRAHYOID NECK: Normal oropharynx, oral cavity, parapharyngeal space, and  retropharyngeal space. INFRAHYOID NECK: Normal larynx, hypopharynx and supraglottis. PAROTID GLANDS: Normal.  SUBMANDIBULAR GLANDS: Normal.  THYROID GLAND: Normal. No nodule. LYMPH NODES: None enlarged by CT size criteria . LUNG APICES: Clear. OSSEOUS STRUCTURES: No destructive bone lesion. There are degenerative changes  mid to lower cervical spine  ADDITIONAL COMMENTS: There are areas of mucosal thickening in the maxillary  sinuses left greater than right. There is a 0.5 x 0.9 x 1.3 cm enhancing nodule on the right lateral to the  esophagus posterior to the mid thyroid level suspicious for parathyroid adenoma. IMPRESSION:   1. There is a 0.5 x 0.9 x 1.3 cm enhancing nodule on the right lateral to the  esophagus at the level of the mid right thyroid gland suspicious for parathyroid  adenoma. Pre-operative Sestamibi Scan done prior to operation is non-localizing.   Procedure (s) performed:   Neck exploration   Radioguided parathyroid surgery  Parathyroidectomy - resection of RIGHT SUPERIOR parathyroid adenoma  Parathyroidectomy - resection of LEFT INFERIOR parathyroid adenoma  Laryngeal nerve monitoring   Findings:   Abnormal parathyroid gland identified - grossly consistent with adenoma in the RIGHT SUPERIOR anatomical location. This adenoma was large (42 x 10 mm), elongated, multilobulated with abundant fatty tissue and the resected specimen likely included the right inferior parathyroid gland. It was completely excised and submitted intact to pathology. Frozen section: hypercellular parathyroid tissue (1,580 mg in weight)    Clinically normal left superior parathyroid gland (6 x 5 mm); no biopsy obtained. Abnormal parathyroid gland identified - grossly consistent with adenoma in the LEFT INFERIOR anatomical location. It was completely excised and submitted intact to pathology. No findings suspicious for malignancy. No palpable central or lateral neck adenopathy. A diligent search of the central and both lateral areas of the neck was unrevealing, specifically there was no palpable adenopathy in either the right or left lateral (Level II, III, IV) neck, or the central compartment (Level VI and VII). Right lobe measures approximately 5.1 x 1.6 cm. Left lobe measures approximately 5.1 x 1.4 cm. Isthmus measures 5 mm    Both right and left recurrent laryngeal nerves were identified and carefully preserved throughout the entire course of the operation and confirmed to be structurally and functionally intact. The structural and functional integrity of the left and right recurrent laryngeal nerves was confirmed with the nerve stimulator ([employer] System), as prominent audible signal was attained when both branches of the left and right recurrent laryngeal nerves were stimulated. Excellent hemostasis confirmed at end of operation.      Specimens:   ID Type Source Tests Collected by Time Destination   1 : RIGHT SUPERIOR PARATHYROID ADENOMA Frozen Section   Arnav Arce MD 7/9/2021 1429 Pathology   2 : LEFT INFERIOR PARATHYROID ADENOMA Frozen Section Vani Wu MD 7/9/2021 1438 Pathology        The patient was escorted to the operating room. Upon arrival to the operative room, the patient, procedure, and operative site were confirmed via a pre-operative time-out. All were in agreement. The patient was placed in the supine position and general anesthesia induced without incident using a NIM endotracheal tube for the purpose of laryngeal nerve monitoring. Prophylactic antibiotic (ANCEF 2 grams IVPB) was administered prior to the procedure. Beta blocker not indicated. With the patient in the supine position the arms were tucked, a pillow was placed behind the knees and the heels padded; the neck was slightly extended, and operating table elevated to 30 degrees. Roll was placed behind the scapulae to create mild degree of neck extension. Pressure ulcer prophylaxis was in effect with pressure point padding. VTE prophylaxis was also in effect with LORRAINE hose and lower extremity mechanical compression devices. Sterile anterior neck prep (Chlorhexidine - alcohol) and drape. We prayed for our patient and we repeated the TIME OUT prior to commencing the operation to confirm the    correct patient,    correct operative site,    correct operative procedure, and    necessary equipment on hand to conduct the operation safely. Local anesthesia (6 ml) infiltrated subcutaneously at site of planned anterior cervical skin incision. Pre-operative serum iPTH level was 64.9 pg/ml. A 7 cm, anterior cervical incision made. Limited sub-platysmal flaps were created. [employer] Navigator GPS radio-immuno-guided surgery device was set on Tc99m and 100X. Strap musculature was  in the midline. A dissection plane was developed posterior to the right neck strap musculature using cautery and facilitated by atraumatic [place] retractors.    Kimber Graver was used to displace the right thyroid lobe anteromedially as the right lateral thyroid recess was developed using electrocautery, and then gentle blunt dissection using a Kitner dissector. The fibroareolar floor was opened with electrocautery that provided access to the right posterior neck. We began the search for the right superior parathyroid gland by dissecting. We sought to identify the key structures for exposure of the right superior parathyroid gland -the arteriovenous band situated immediately cephalad to where the right middle thyroid vein is situated. Dissection was performed using a combination of gentle blunt Kitner dissection as well as the tip of a right-angle clamp. Our dissection was focused on the andreia-esophageal region as we proceeded cephalad with our dissection at the level of the middle thyroid vein and mid thyroid gland, based on the pre-operative CT finding: 0.5 x 0.9 x 1.3 cm enhancing nodule on the right lateral to the esophagus posterior to the mid thyroid level suspicious for parathyroid adenoma. The right superior parathyroid gland was identified and found to be clinically abnormal in size and appearance. This adenoma was large (42 x 10 mm), elongated, multilobulated with abundant fatty tissue, extending from the periesophageal region, posterolateral to the right recurrent laryngeal nerve to the region anteromedial to the right recurrent laryngeal nerve; this conglomerate of parathyroid glandular and adipose tissue likely included the right inferior parathyroid gland. We took care not to disrupt the parathyroid gland during dissection, mobilization, and complete evaluation of the abnormal appearing right superior parathyroid gland. Diligent search for the right inferior parathyroid gland was unrevealing as it was most likely contained within the right paraesophageal conglomerate of parathyroid glandular and adipose tissue extending from posterolateral to anteromedial to the right recurrent laryngeal nerve. ---  Attention was then directed to the left neck.    A dissection plane was developed posterior to the left strap musculature using cautery and facilitated by atraumatic retractors. John Basilia was used to displace the left thyroid lobe as the left lateral thyroid recess was developed using electrocautery, and then gentle blunt dissection using the Kitner dissector. The fibroareolar floor in the left posterior neck was opened with electrocautery that provided access to the left andreia-esophageal space. We sought to identify the key structures for exposure of the left superior parathyroid gland -the arteriovenous band situated immediately cephalad to where the left middle thyroid vein is situated, and posterior and lateral to the left recurrent laryngeal nerve. The superior parathyroid gland is usually encountered at the Ligament of [place] dorsolateral to the recurrent nerve. The superior parathyroid gland originates from the 4th pharyngeal pouch and is more posterior in position relative to the more anteriorly situated inferior parathyroid gland. The inferior parathyroid gland is usually encountered anterior and medial to the recurrent nerve, inferior to where the recurrent nerve crosses the inferior thyroid artery. The inferior parathyroid gland originates from the 3rd pharyngeal pouch. Typically (~75% of cases), the superior parathyroid gland is situated near the posterior aspect of the thyroid gland, at the level of the cricothyroid junction, in proximity to where the recurrent laryngeal nerve enters the larynx. The superior parathyroid gland is consistently situated dorsal/posterior and lateral to the recurrent laryngeal nerve. In a smaller percentage of patients (~24% of cases) the superior parathyroid gland is adjacent to the superior pole of the thyroid gland. In an exceedingly small percentage of patients (~1%), the superior parathyroid gland is in a retro-pharyngeal, para-esophageal, retro-esophageal, or posterior-superior mediastinal location. Rarely is the superior parathyroid gland situated within the substance of the superior or lateral pole of the thyroid gland. The left superior parathyroid gland was identified and found to be clinically normal in size and appearance (6 x 5 mm in size) - soft, tan, flattened, ovoid and smoothly encapsulated. We took care not to disrupt the left superior parathyroid gland during dissection, mobilization, and complete evaluation of this parathyroid gland. The normal appearing left superior parathyroid gland was not biopsied. ---   We began the search for the left inferior parathyroid gland by dissecting gently low into the thyro-thymic ligament and apical thymus, and then proceeding cephalad with the dissection. The anatomic location of the inferior parathyroid gland is much more variable than that of the superior parathyroid gland, given the longer and more variable embryological migratory path of the 3rd pharyngeal pouch with the thymus. The inferior parathyroid gland may be embedded within the thymus, thyroid gland, the anterior or posterior mediastinum. The most common location we find the inferior parathyroid gland (~40% of the time) is in proximity to the posterior thyroid capsule at the level of the 1st tracheal ring, in proximity to the inferior thyroid artery - anterior and medial to the recurrent nerve, inferior to where the recurrent nerve crosses the inferior thyroid artery. When we do not find the inferior parathyroid gland in this location we search for other possible anatomic locations - thyro-thymic ligament, within the substance of the thymus near the level of the thoracic inlet (in ~40% of cases the inferior parathyroid gland is found in this location).    Other potential anatomic locations for the inferior parathyroid gland include lateral to the thyroid gland (15%), or in atypical or ectopic locations (carotid sheath or mid-thyroid level lateral to the sheath, or deep within the thyroid gland - 2% of cases). When not identified during operation, post-operative imaging is performed with persistent or recurrent hyperparathyroidism to search for the abnormal inferior parathyroid gland or in rare cases a 5th parathyroid gland. Anatomic areas assessed during post-operative imaging include the anterior or posterior mediastinum, posterior to the trachea and anterior to the esophagus, or posterior to the esophagus, and at or above the angle of the mandible (to assess for an undescended parathyroid gland). Dissection was performed using a combination of gentle blunt Kitner dissection as well as the tip of a right-angle clamp to identify and expose an abnormal-appearing left inferior parathyroid gland (13 x 8 mm in size), grossly consistent with parathyroid adenoma. We took care not to disrupt the left inferior parathyroid gland during dissection, mobilization, and complete evaluation of this abnormal appearing parathyroid gland. ---   We re-directed our attention to the right neck. We took care not to disrupt the parathyroid gland during dissection, mobilization, and complete evaluation of the right superior parathyroid gland. The right superior parathyroid gland adenoma was excised (42 x 10 mm in size and 1580 grams in weight with ex vivo counts of 294 pg/mg/min and background count of 28 pg/mg/min; i.e. hyperactive parathyroid), and submitted to frozen section, which was confirmatory - hypercellular parathyroid tissue. This adenoma was large (42 x 10 mm), elongated, multilobulated with abundant fatty tissue and the resected specimen likely included the right inferior parathyroid gland. It was completely excised and submitted intact to pathology.   As our careful search for the right inferior parathyroid gland was unrevealing, we believe that it was most likely contained within the right paraesophageal conglomerate of parathyroid glandular and adipose tissue extending from posterolateral to anteromedial to the right recurrent laryngeal nerve. We re-directed our attention to the left neck. We took care not to disrupt the parathyroid gland during dissection, mobilization, and complete evaluation of the left inferior parathyroid gland. The left inferior parathyroid gland adenoma was excised (13 x 8 mm in size and 340 grams in weight with ex vivo counts of 77 pg/mg/min and background count of 28 pg/mg/min; i.e. hyperactive parathyroid), and submitted to frozen section, which was confirmatory - cellular parathyroid tissue. Blood was drawn after the excision of the right superior parathyroid adenoma - serum iPTH 10 minutes following right superior parathyroid adenoma resection was 33.0 pg/ml, and after resection of the left inferior parathyroid adenoma resection it was 17.6 pg/ml. ---  Throughout our dissection on both sides of the neck we oriented on the recurrent laryngeal nerves and kept the dissection on the capsule of all four parathyroid glands, taking great care not to rupture the parathyroid gland capsule. We made certain to expose the entire parathyroid gland for each one identified to assess the gland fully. As we did on the left, during the dissection on the right we took care to maintain the encountered recurrent laryngeal nerve out of harms way. With one normal appearing parathyroid gland identified and carefully preserved (left superior parathyroid gland), and two abnormal parathyroid glands excised (right superior and left inferior parathyroid adenomas, with suspected right inferior parathyroid gland in the resected right superior parathyroid paraesophageal gland and adipose tissue conglomerate that extended from posterolateral to anteromedial to the right recurrent laryngeal nerve), and with normalization of serum iPTH after removal of the two parathyroid adenomas (right superior and left inferior parathyroid adenomas), we elected to conclude the operation.   To summarize: post-resection of the both right superior and left inferior parathyroid adenoma resection, serum iPTH level dropped from 64.9 pg/ml to 17.6 pg/ml, biochemically consistent with curative resection according to our pre-specified criteria:   1. > 50% drop in serum iPTH from pre-operative baseline, and   2. drop of serum iPTH into the normal range. The structural and functional integrity of the left and right recurrent laryngeal nerves was confirmed with the nerve stimulator ([employer] System), as a loud audible signal was attained when both branches of the left and right recurrent laryngeal nerves were stimulated. A diligent search of the central and both lateral areas of the neck was unrevealing, specifically there was no palpable adenopathy in either the right or left lateral (Level II, III, IV) neck, or the central compartment (Level VI and VII)   Excellent hemostasis was confirmed. The operative site was irrigated with normal saline and excellent hemostasis confirmed. **   An adjunctive tool, parathyroid detection system was utilized to confirm the surgeon-visualized parathyroid glands throughout the operation. The PTeye parathyroid detection system uses laser probe to evoke a fluorescent response from and confirm parathyroid tissue. Baseline PTeye was 29, with confirmatory detection ratio of   3.0 for the right superior parathyroid gland,  3.2 for the left superior parathyroid gland,  10.0 for the left inferior parathyroid gland. **  Excellent hemostasis in the operative field was re-confirmed under repeated Valsalva maneuver. The operative field was irrigated and hemostasis once again confirmed. The strap musculature (sternothyroid and sternohyoid muscles) was re-approximated in the midline with running 3-0 Vicryl suture. The platysma muscle was reconstituted with running absorbable (3-0 Vicryl) suture   The wound was irrigated with saline.    The skin closure was completed with running subcuticular 5-0 monocryl suture and transversely oriented Steri-strips applied to the closed surgical incision. This was an uncomplicated operation with minimal blood loss. The patient was extubated without incident and escorted to the PACU in stable condition with aspiration precautions in effect   Complications: None   Implants: None   Disposition:    To PACU extubated and in stable condition with aspiration precautions in effect   Tori Carvajal MD, MBA [facility]  Low Carbone MD    Electronically Signed by Tori Carvajal MD on 7/9/2021 at 5:27 PM

## 2021-07-10 VITALS
HEIGHT: 64 IN | SYSTOLIC BLOOD PRESSURE: 127 MMHG | BODY MASS INDEX: 35.51 KG/M2 | OXYGEN SATURATION: 99 % | DIASTOLIC BLOOD PRESSURE: 70 MMHG | TEMPERATURE: 97.3 F | HEART RATE: 52 BPM | WEIGHT: 208 LBS | RESPIRATION RATE: 16 BRPM

## 2021-07-10 LAB
CALCIUM SERPL-MCNC: 11.8 MG/DL (ref 8.5–10.1)
MAGNESIUM SERPL-MCNC: 2 MG/DL (ref 1.6–2.4)
PHOSPHATE SERPL-MCNC: 4.7 MG/DL (ref 2.6–4.7)

## 2021-07-10 PROCEDURE — 36415 COLL VENOUS BLD VENIPUNCTURE: CPT

## 2021-07-10 PROCEDURE — 99218 HC RM OBSERVATION: CPT

## 2021-07-10 PROCEDURE — 83735 ASSAY OF MAGNESIUM: CPT

## 2021-07-10 PROCEDURE — 74011250637 HC RX REV CODE- 250/637: Performed by: OTOLARYNGOLOGY

## 2021-07-10 PROCEDURE — 82310 ASSAY OF CALCIUM: CPT

## 2021-07-10 PROCEDURE — 84100 ASSAY OF PHOSPHORUS: CPT

## 2021-07-10 RX ORDER — ONDANSETRON 4 MG/1
4 TABLET, FILM COATED ORAL
Qty: 30 TABLET | Refills: 3 | Status: SHIPPED | OUTPATIENT
Start: 2021-07-10 | End: 2022-01-28

## 2021-07-10 RX ORDER — FERROUS SULFATE, DRIED 160(50) MG
2 TABLET, EXTENDED RELEASE ORAL EVERY 6 HOURS
Qty: 180 TABLET | Refills: 3 | Status: SHIPPED | OUTPATIENT
Start: 2021-07-10 | End: 2022-02-25

## 2021-07-10 RX ORDER — CALCITRIOL 0.25 UG/1
0.5 CAPSULE ORAL 2 TIMES DAILY
Qty: 90 CAPSULE | Refills: 3 | Status: SHIPPED | OUTPATIENT
Start: 2021-07-10 | End: 2022-01-28

## 2021-07-10 RX ORDER — DOCUSATE SODIUM 100 MG/1
100 CAPSULE, LIQUID FILLED ORAL
Qty: 60 CAPSULE | Refills: 2 | Status: SHIPPED | OUTPATIENT
Start: 2021-07-10 | End: 2021-10-08

## 2021-07-10 RX ORDER — OXYCODONE AND ACETAMINOPHEN 5; 325 MG/1; MG/1
1 TABLET ORAL
Qty: 12 TABLET | Refills: 0 | Status: SHIPPED | OUTPATIENT
Start: 2021-07-10 | End: 2021-07-13

## 2021-07-10 RX ADMIN — OXYCODONE AND ACETAMINOPHEN 1 TABLET: 10; 325 TABLET ORAL at 04:52

## 2021-07-10 RX ADMIN — OXYCODONE AND ACETAMINOPHEN 1 TABLET: 10; 325 TABLET ORAL at 00:43

## 2021-07-10 RX ADMIN — METFORMIN HYDROCHLORIDE 1000 MG: 500 TABLET ORAL at 08:34

## 2021-07-10 RX ADMIN — CALCIUM CARBONATE-VITAMIN D TAB 500 MG-200 UNIT 1 TABLET: 500-200 TAB at 06:37

## 2021-07-10 RX ADMIN — CHLORTHALIDONE 25 MG: 25 TABLET ORAL at 08:34

## 2021-07-10 RX ADMIN — CALCIUM CARBONATE-VITAMIN D TAB 500 MG-200 UNIT 1 TABLET: 500-200 TAB at 00:42

## 2021-07-10 RX ADMIN — CALCITRIOL CAPSULES 0.25 MCG 0.25 MCG: 0.25 CAPSULE ORAL at 08:34

## 2021-07-10 RX ADMIN — LISINOPRIL 10 MG: 10 TABLET ORAL at 08:34

## 2021-07-10 NOTE — PROGRESS NOTES
0740: Bedside shift change report given to Patsy Peck RN (oncoming nurse) by Mendel Dee RN (offgoing nurse). Report included the following information SBAR, Kardex, OR Summary, Procedure Summary, Intake/Output, MAR and Recent Results.

## 2021-07-10 NOTE — DISCHARGE INSTRUCTIONS
Post  Parathyroidectomy Instructions    Follow up: with Dr. Silva Angela 3 weeks after surgery  . Shortly after surgery call [phone] to schedule this appointment. You may remove your steristrips after 2 weeks     Eat regular foods.  You may shower in 24 hours. Do not allow direct water pressure on your wound. If water trickles down while washing your hair, allow the wound to dry on its own.  Do not scrub or soak your wound for 2 weeks or 14 days.  No strenuous activity: for 14 days.  No moving more than 15 pounds: for 14 days. Then includes pulling, pushing, tugging, throwing.  There is generally not a lot of pain: with this surgery. Take your pain medication as needed. Most patients, if they do have pain, will have neck stiffness/discomfort. You may have numbness or tingling surrounding the area of your wound. Narcotics can cause constipation; use your Colace if this is the case.  Nausea and vomiting: from lingering effects of general anesthesia usually resolves by the following day. The narcotic pain medication can cause nausea and vomiting. They should be taken with food or fluids to minimize this. Medications that reduce nausea and vomiting can be prescribed by your physician.  Fever above 100.4, redness around wound, pus drainage from wound: call your doctor.  Bleeding: is uncommon (less than 1%). If it does occur your neck will develop a fullness. It is good to take a look at your neck shortly after surgery to see what a baseline appearance is. If there are changes to this, then call your provider. \    [id number] [phone] for concerns - a doctor is on call 24./7  Special Instructions    o You may be on Calcium (Oscal) - it is very important that you take this.   Dr. Silva Angela will start a taper at your follow up visit  o If you experience tingling in the hands or around your mouth, your calcium may be dropping, go to the emergency room immediately and tell them you had your parathyroid removed.

## 2021-07-10 NOTE — PROGRESS NOTES
Patient interviewed and examined  She reports having an uneventful night  The patient expressed gratitude for healthcare services provided  Pain under good control  Tolerating oral intake well  Voiding without difficulty  Ambulatory    Appears comfortable, sitting upright in bed  Normal voice - no dysphonia, dysarthria or hoarseness    Alert and fully oriented  In good spirits  Vital signs stable  T 98.5  HR 53  RR 16  /65  SpO2 94% on room air    Anterior cervical incision clean, dry, intact with steri strips in place  No signs of operative site hematoma    Serum Ca/Mg/Phos 11.8/2.0/4.7  iPTH < 6.3 pg/ml    Doing well POD1 s/p neck exploration, radioguided parathyroid surgery and resection of right superior and left inferior parathyroid adenomas  Details of operation provided to patient  All of her questions were answered to her stated satisfaction    Anticipate progressive decline in serum Ca into normal range post-op with verification of curative parathyroidectomy   (serum iPTH 64.9 to <6.3 pg/ml)    Discharge instructions provided and I underscored importance of Calcium and Vitamin D replacement regimen adherence    Continue Calcium and Vit D replacement    Discharge home today    Mustapha Sullivan MD [facility]

## 2021-08-12 ENCOUNTER — TELEPHONE (OUTPATIENT)
Dept: ENDOCRINOLOGY | Age: 66
End: 2021-08-12

## 2021-08-12 NOTE — TELEPHONE ENCOUNTER
Get labs done now  - comp panel , intact pth     And  bnv  - can be in  4-5  months - if she has no symptoms   comp panel and intact pth

## 2021-08-12 NOTE — TELEPHONE ENCOUNTER
Patient just released from surgeon. She recently had parathyroid surgery and would like to know the next step.

## 2021-08-13 NOTE — TELEPHONE ENCOUNTER
Informed pt per Dr Que Neely to have labs drawn soon. Pt states she has an appt with the surgeon soon and will have labs drawn then. Pt asked to be transferred to  for follow up visit in 4-5 months.

## 2021-09-28 RX ORDER — ROSUVASTATIN CALCIUM 20 MG/1
TABLET, COATED ORAL
Qty: 30 TABLET | Refills: 3 | Status: SHIPPED | OUTPATIENT
Start: 2021-09-28 | End: 2022-03-09

## 2021-11-03 LAB
BUN SERPL-MCNC: 18 MG/DL (ref 8–27)
BUN/CREAT SERPL: 25 (ref 12–28)
CALCIUM SERPL-MCNC: 10.1 MG/DL (ref 8.7–10.3)
CHLORIDE SERPL-SCNC: 99 MMOL/L (ref 96–106)
CO2 SERPL-SCNC: 28 MMOL/L (ref 20–29)
CREAT SERPL-MCNC: 0.73 MG/DL (ref 0.57–1)
GLUCOSE SERPL-MCNC: 103 MG/DL (ref 65–99)
POTASSIUM SERPL-SCNC: 4.5 MMOL/L (ref 3.5–5.2)
PTH-INTACT SERPL-MCNC: 36 PG/ML (ref 15–65)
SODIUM SERPL-SCNC: 141 MMOL/L (ref 134–144)

## 2021-11-09 ENCOUNTER — OFFICE VISIT (OUTPATIENT)
Dept: ENDOCRINOLOGY | Age: 66
End: 2021-11-09
Payer: MEDICARE

## 2021-11-09 VITALS
RESPIRATION RATE: 18 BRPM | OXYGEN SATURATION: 97 % | SYSTOLIC BLOOD PRESSURE: 120 MMHG | BODY MASS INDEX: 35.85 KG/M2 | TEMPERATURE: 97.3 F | HEART RATE: 88 BPM | DIASTOLIC BLOOD PRESSURE: 52 MMHG | HEIGHT: 64 IN | WEIGHT: 210 LBS

## 2021-11-09 DIAGNOSIS — E21.0 PRIMARY HYPERPARATHYROIDISM (HCC): Primary | ICD-10-CM

## 2021-11-09 DIAGNOSIS — E55.9 VITAMIN D DEFICIENCY: ICD-10-CM

## 2021-11-09 PROCEDURE — G8754 DIAS BP LESS 90: HCPCS | Performed by: INTERNAL MEDICINE

## 2021-11-09 PROCEDURE — 1101F PT FALLS ASSESS-DOCD LE1/YR: CPT | Performed by: INTERNAL MEDICINE

## 2021-11-09 PROCEDURE — G8427 DOCREV CUR MEDS BY ELIG CLIN: HCPCS | Performed by: INTERNAL MEDICINE

## 2021-11-09 PROCEDURE — G8510 SCR DEP NEG, NO PLAN REQD: HCPCS | Performed by: INTERNAL MEDICINE

## 2021-11-09 PROCEDURE — 1090F PRES/ABSN URINE INCON ASSESS: CPT | Performed by: INTERNAL MEDICINE

## 2021-11-09 PROCEDURE — G8536 NO DOC ELDER MAL SCRN: HCPCS | Performed by: INTERNAL MEDICINE

## 2021-11-09 PROCEDURE — G8399 PT W/DXA RESULTS DOCUMENT: HCPCS | Performed by: INTERNAL MEDICINE

## 2021-11-09 PROCEDURE — G8417 CALC BMI ABV UP PARAM F/U: HCPCS | Performed by: INTERNAL MEDICINE

## 2021-11-09 PROCEDURE — G8752 SYS BP LESS 140: HCPCS | Performed by: INTERNAL MEDICINE

## 2021-11-09 PROCEDURE — 3017F COLORECTAL CA SCREEN DOC REV: CPT | Performed by: INTERNAL MEDICINE

## 2021-11-09 PROCEDURE — 99214 OFFICE O/P EST MOD 30 MIN: CPT | Performed by: INTERNAL MEDICINE

## 2021-11-09 NOTE — LETTER
11/9/2021    Patient: Gabbie Victoria   YOB: 1955   Date of Visit: 11/9/2021     Brianna Patricia MD  [place] [id number]  Via Fax: [phone]    Dear Brianna Patricia MD,      Thank you for referring Ms. Cindy Hollis to [id number] [place] [address] for evaluation. My notes for this consultation are attached. If you have questions, please do not hesitate to call me. I look forward to following your patient along with you.       Sincerely,    Celestina Thibodeaux MD

## 2021-11-09 NOTE — PROGRESS NOTES
Freddy Bautista is a 73 y.o. female here for   Chief Complaint   Patient presents with    Elevated Blood Calcium    Vitamin D Deficiency       1. Have you been to the ER, urgent care clinic since your last visit? Hospitalized since your last visit? -no    2. Have you seen or consulted any other health care providers outside of the [address] [place] [address] since your last visit?   Include any pap smears or colon screening.-no

## 2021-11-09 NOTE — PROGRESS NOTES
Michelle Murdock MD          Patient Information  Name : Danie Steele 73 y.o.     YOB: 1955         Referred by: Honorio Vega MD       Chief Complaint   Patient presents with    Elevated Blood Calcium    Vitamin D Deficiency       History of present illness:    Danie Steele is a 73 y.o. female here for follow-up  She has hypercalcemia dating back to 2011, initially was mild, now calcium has been ranging from 11.2-11.5, with elevated PTH. She is on chlorthalidone. 24-hour urine calcium was 456  Creatinine 0.67, , , vitamin D 45  Nuclear medicine parathyroid scan was nonlocalizing  CT neck right parathyroid adenoma  DEXA 2021: No osteoporosis  Status post parathyroidectomy, required calcium supplements postop    OP note   Findings:   Abnormal parathyroid gland identified  grossly consistent with adenoma in the RIGHT SUPERIOR anatomical location. This adenoma was large (42 x 10 mm), elongated, multilobulated with abundant fatty tissue and the resected specimen likely included the right inferior parathyroid gland. It was completely excised and submitted intact to pathology. Frozen section: hypercellular parathyroid tissue (1,580 mg in weight)     Clinically normal left superior parathyroid gland (6 x 5 mm); no biopsy obtained.     Abnormal parathyroid gland identified  grossly consistent with adenoma in the LEFT INFERIOR anatomical location. It was completely excised and submitted intact to pathology. Type 2 diabetes:  On Metformin        BP Readings from Last 3 Encounters:   11/09/21 (!) 120/52   07/10/21 127/70   06/28/21 117/65       Wt Readings from Last 3 Encounters:   11/09/21 210 lb (95.3 kg)   07/09/21 208 lb (94.3 kg)   06/28/21 208 lb 1.8 oz (94.4 kg)       Past Medical History:   Diagnosis Date    Adverse effect of anesthesia     DIFFICULTY WAKING     COVID-19 vaccine series completed     PFIZER  03/10/21 &03/31/21    Diabetes ([facility] [address]     History of mitral valve repair 2011    Hypertension     Mitral valve disorders(424.0)     Other ill-defined conditions(799.89)     mitral valve regurg    Sleep apnea with use of continuous positive airway pressure (CPAP)     USES CPAP    Unspecified adverse effect of anesthesia     difficulty waking       Current Outpatient Medications   Medication Sig    rosuvastatin (CRESTOR) 20 mg tablet TAKE 1 TABLET BY MOUTH EVERY DAY AT NIGHT    metFORMIN (GLUCOPHAGE) 1,000 mg tablet Take 1,000 mg by mouth two (2) times daily (with meals).  chlorthalidone (HYGROTEN) 25 mg tablet TAKE 1 TABLET BY MOUTH EVERY DAY    cpap machine kit by Does Not Apply route.  lisinopril (PRINIVIL, ZESTRIL) 10 mg tablet Take 1 Tab by mouth daily.  ascorbic acid, vitamin C, (VITAMIN C) 1,000 mg tablet Take  by mouth daily.  multivitamin (ONE A DAY) tablet Take 1 Tab by mouth daily.  omega-3 fatty acids-vitamin e (FISH OIL) 1,000 mg cap Take 1 Cap by mouth daily.  calcitRIOL (ROCALTROL) 0.25 mcg capsule Take 2 Capsules by mouth two (2) times a day. (Patient not taking: Reported on 11/9/2021)    calcium-vitamin D (OS-BRIANNE +D3) 500 mg-200 unit per tablet Take 2 Tablets by mouth every six (6) hours. (Patient not taking: Reported on 11/9/2021)    ondansetron hcl (Zofran) 4 mg tablet Take 1 Tablet by mouth every eight (8) hours as needed for Nausea or Vomiting. (Patient not taking: Reported on 11/9/2021)    OTHER Black strap malassis (Patient not taking: Reported on 11/9/2021)     No current facility-administered medications for this visit. Allergies   Allergen Reactions    Other Plant, Animal, Environmental Other (comments)     hayfever         Review of Systems:  - Per HPI    Physical Examination:  Blood pressure (!) 120/52, pulse 88, temperature 97.3 °F (36.3 °C), temperature source Temporal, resp. rate 18, height 5' 4\" (1.626 m), weight 210 lb (95.3 kg), SpO2 97 %.   - General: pleasant, no distress, good eye contact  - HEENT: no exophthalmos, no periorbital edema, EOMI  - Neck: Healed scar  - RS: Normal respiratory effort  - Musculoskeletal: no tremors  - Neurological: alert and oriented  - Psychiatric: normal mood and affect  - Skin: Normal color    Data Reviewed:     Lab Results   Component Value Date/Time    Calcium 10.1 11/02/2021 12:00 AM    Phosphorus 4.7 07/10/2021 04:58 AM     Lab Results   Component Value Date/Time    VITAMIN D, 25-HYDROXY 38.4 04/21/2021 09:11 AM       Lab Results   Component Value Date/Time    TSH 0.527 02/22/2018 11:33 AM     Lab Results   Component Value Date/Time    Sodium 141 11/02/2021 12:00 AM    Potassium 4.5 11/02/2021 12:00 AM    Chloride 99 11/02/2021 12:00 AM    CO2 28 11/02/2021 12:00 AM    Anion gap 10 11/19/2010 03:55 AM    Glucose 103 (H) 11/02/2021 12:00 AM    BUN 18 11/02/2021 12:00 AM    Creatinine 0.73 11/02/2021 12:00 AM    BUN/Creatinine ratio 25 11/02/2021 12:00 AM    GFR est  11/02/2021 12:00 AM    GFR est non-AA 87 11/02/2021 12:00 AM    Calcium 10.1 11/02/2021 12:00 AM       [] Reviewed labs    Assessment/Plan:     Primary hyperparathyroidism  S/p parathyroidiectomy 3 gland resection on 7/2021 Dr Alex Magaña    24 hour urine calcium 456, total volume 2.9 L, urine creatinine 1639mg/dL which makes primary hyperparathyroidism more likely than chlorthalidone induced hypercalcemia in which 24-hour urine calcium will be lower due to hypercalciuria. She was on hydrochlorothiazide before, now on chlorthalidone. Status post parathyroidectomy  She is off calcium supplements  Calcium is 10, she is on chlorthalidone  Not on calcium supplements, PTH 25  To return if she has persistently high calcium, recommend monitoring BMP every 6 months.     Type 2 diabetes mellitus: On Metformin    Vitamin D deficiency    Spent > 40 minutes on the day of the visit reviewing chart, examining, ordering/reviewing labs, counseling, discussing therapeutics and documentation in the medical record    There are no Patient Instructions on file for this visit. Thank you for allowing me to participate in the care of this patient. Sonya Cummings MD        Patient Nevaeh Negro verbalized understanding   Voice-recognition software was used to generate this report, which may result in some phonetic-based errors in the grammar and contents. Even though attempts were made to correct all the mistakes, some may have been missed and remained in the body of the report.

## 2022-01-28 ENCOUNTER — OFFICE VISIT (OUTPATIENT)
Dept: CARDIOLOGY CLINIC | Age: 67
End: 2022-01-28
Payer: MEDICARE

## 2022-01-28 VITALS
HEART RATE: 72 BPM | WEIGHT: 212.8 LBS | BODY MASS INDEX: 36.33 KG/M2 | DIASTOLIC BLOOD PRESSURE: 60 MMHG | HEIGHT: 64 IN | SYSTOLIC BLOOD PRESSURE: 119 MMHG

## 2022-01-28 DIAGNOSIS — I10 ESSENTIAL HYPERTENSION: Primary | ICD-10-CM

## 2022-01-28 DIAGNOSIS — E78.5 DYSLIPIDEMIA: ICD-10-CM

## 2022-01-28 DIAGNOSIS — E66.09 CLASS 2 OBESITY DUE TO EXCESS CALORIES WITH BODY MASS INDEX (BMI) OF 39.0 TO 39.9 IN ADULT, UNSPECIFIED WHETHER SERIOUS COMORBIDITY PRESENT: ICD-10-CM

## 2022-01-28 DIAGNOSIS — G47.33 OSA ON CPAP: ICD-10-CM

## 2022-01-28 DIAGNOSIS — Z99.89 OSA ON CPAP: ICD-10-CM

## 2022-01-28 DIAGNOSIS — E66.01 SEVERE OBESITY (BMI 35.0-35.9 WITH COMORBIDITY) (HCC): ICD-10-CM

## 2022-01-28 DIAGNOSIS — Z98.890 H/O MITRAL VALVE REPAIR: ICD-10-CM

## 2022-01-28 PROCEDURE — G8399 PT W/DXA RESULTS DOCUMENT: HCPCS | Performed by: SPECIALIST

## 2022-01-28 PROCEDURE — G8417 CALC BMI ABV UP PARAM F/U: HCPCS | Performed by: SPECIALIST

## 2022-01-28 PROCEDURE — G8754 DIAS BP LESS 90: HCPCS | Performed by: SPECIALIST

## 2022-01-28 PROCEDURE — 99214 OFFICE O/P EST MOD 30 MIN: CPT | Performed by: SPECIALIST

## 2022-01-28 PROCEDURE — G8427 DOCREV CUR MEDS BY ELIG CLIN: HCPCS | Performed by: SPECIALIST

## 2022-01-28 PROCEDURE — 3017F COLORECTAL CA SCREEN DOC REV: CPT | Performed by: SPECIALIST

## 2022-01-28 PROCEDURE — 1101F PT FALLS ASSESS-DOCD LE1/YR: CPT | Performed by: SPECIALIST

## 2022-01-28 PROCEDURE — 1090F PRES/ABSN URINE INCON ASSESS: CPT | Performed by: SPECIALIST

## 2022-01-28 PROCEDURE — G8752 SYS BP LESS 140: HCPCS | Performed by: SPECIALIST

## 2022-01-28 PROCEDURE — G8536 NO DOC ELDER MAL SCRN: HCPCS | Performed by: SPECIALIST

## 2022-01-28 PROCEDURE — G8432 DEP SCR NOT DOC, RNG: HCPCS | Performed by: SPECIALIST

## 2022-01-28 RX ORDER — LANOLIN ALCOHOL/MO/W.PET/CERES
1000 CREAM (GRAM) TOPICAL DAILY
COMMUNITY

## 2022-01-28 NOTE — LETTER
1/28/2022    Patient: Gabriela Navarro   YOB: 1955   Date of Visit: 1/28/2022     Jovanny Carrero, [address] [place] [address] Drive [address]  Via Fax: [phone]    Dear Jovanny Carrero MD,      Thank you for referring Ms. Angelo Vora to [facility] for evaluation. My notes for this consultation are attached. If you have questions, please do not hesitate to call me. I look forward to following your patient along with you.       Sincerely,    Justin Anderson MD

## 2022-01-28 NOTE — PROGRESS NOTES
CARDIOLOGY OFFICE NOTE    Kwame Hawley MD, [address], [place], [address] [facility]  Phone [phone]; Fax [phone]  Mobile [phone]   Voice Mail [phone]         ATTENTION:   This medical record was transcribed using an electronic medical records/speech recognition system. Although proofread, it may and can contain electronic, spelling and other errors. Corrections may be executed at a later time. Please feel free to contact us for any clarifications as needed. ICD-10-CM ICD-9-CM   1. Essential hypertension  I10 401.9   2. Severe obesity (BMI 35.0-35.9 with comorbidity) ([facility])  E66.01 278.01    Z68.35 V85.35   3. H/O mitral valve repair  Z98.890 V15.1   4. Dyslipidemia  E78.5 272.4   5. BILLY on CPAP  G47.33 327.23    Z99.89 V46.8   6. Class 2 obesity due to excess calories with body mass index (BMI) of 39.0 to 39.9 in adult, unspecified whether serious comorbidity present  E66.09 278.00    Z68.39 V85.39            Greg Harding is a 73 y.o. female with hypertension, dyslipidemia, diabetes and mitral valve repair referred for 6 month follow up. Cardiac risk factors: family history, dyslipidemia, diabetes, obesity, hypertension, post-menopausal  I have personally obtained the history from the patient. HISTORY OF PRESENTING ILLNESS     Doing well with no chest pain or shortness of breath or any interval cardiac issues. When asked if she had any questions or concerns she said no. ACTIVE PROBLEM LIST     Patient Active Problem List    Diagnosis Date Noted    Hyperparathyroidism ([address] 07/09/2021    Diabetes ([address] 06/11/2021    Sleep apnea 06/11/2021    Primary hyperparathyroidism ([address] 06/11/2021    Hypercalcemia 06/11/2021    Vitamin D deficiency 06/11/2021    Severe obesity (BMI 35.0-39. 9) with comorbidity ([address] 04/20/2018    Mitral valve regurgitation 11/12/2010    Hypertension 11/12/2010    Hyperlipidemia 11/12/2010    Palpitations 2010           PAST MEDICAL HISTORY     Past Medical History:   Diagnosis Date    Adverse effect of anesthesia     DIFFICULTY WAKING     COVID-19 vaccine series completed     PFIZER  03/10/21 &21    Diabetes ([facility] [address]     History of mitral valve repair     Hypertension     Mitral valve disorders(424.0)     Other ill-defined conditions(799.89)     mitral valve regurg    Sleep apnea with use of continuous positive airway pressure (CPAP)     USES CPAP    Unspecified adverse effect of anesthesia     difficulty waking           PAST SURGICAL HISTORY     Past Surgical History:   Procedure Laterality Date    CARDIAC CATHETERIZATION      HX  SECTION  1987    c section    HX COLONOSCOPY  2016    HX GI  10/2011    CAMERON    HX MITRAL VALVULOPLASTY  2010    HX MYOMECTOMY  1984    HX PARTIAL HYSTERECTOMY      [place] ABDOMEN SURGERY PROC UNLISTED  1980s    laparotomy    [place] CHEST SURGERY PROCEDURE UNLISTED      mitral valve repair          ALLERGIES     Allergies   Allergen Reactions    Other Plant, Animal, Environmental Other (comments)     hayfever          FAMILY HISTORY     Family History   Problem Relation Age of Onset    Heart Disease Mother     Cancer Mother         THYROID    Hypertension Mother     Alzheimer's Disease Mother     Hypertension Father     Diabetes Father     Cancer Sister         BREAST     Hypertension Sister     Breast Cancer Sister 46    Hypertension Brother     Cancer Maternal Aunt     Diabetes Maternal Aunt     Other Maternal Aunt         multiple myeloma     Cancer Maternal Grandmother     Anesth Problems Neg Hx     negative for cardiac disease       SOCIAL HISTORY     Social History     Socioeconomic History    Marital status:    Occupational History    Occupation:      Employer: [employer]   Tobacco Use    Smoking status: Never Smoker    Smokeless tobacco: Never Used   Vaping Use    Vaping Use: Never used   Substance and Sexual Activity    Alcohol use: Yes     Alcohol/week: 3.0 standard drinks     Types: 3 Glasses of wine per week     Comment: 3-4 week     Drug use: No    Sexual activity: Not Currently         MEDICATIONS     Current Outpatient Medications   Medication Sig    cyanocobalamin (Vitamin B-12) 1,000 mcg tablet Take 1,000 mcg by mouth daily.  rosuvastatin (CRESTOR) 20 mg tablet TAKE 1 TABLET BY MOUTH EVERY DAY AT NIGHT    calcium-vitamin D (OS-BRIANNE +D3) 500 mg-200 unit per tablet Take 2 Tablets by mouth every six (6) hours.  metFORMIN (GLUCOPHAGE) 1,000 mg tablet Take 1,000 mg by mouth two (2) times daily (with meals).  chlorthalidone (HYGROTEN) 25 mg tablet TAKE 1 TABLET BY MOUTH EVERY DAY    cpap machine kit by Does Not Apply route.  lisinopril (PRINIVIL, ZESTRIL) 10 mg tablet Take 1 Tab by mouth daily.  ascorbic acid, vitamin C, (VITAMIN C) 1,000 mg tablet Take  by mouth daily.  multivitamin (ONE A DAY) tablet Take 1 Tab by mouth daily. No current facility-administered medications for this visit. I have reviewed the nurses notes, vitals, problem list, allergy list, medical history, family, social history and medications. REVIEW OF SYMPTOMS   Pertinent positives per HPI  General: Pt denies excessive weight gain or loss. Pt is able to conduct ADL's  HEENT: Denies blurred vision, headaches, hearing loss, epistaxis and difficulty swallowing. Respiratory: Denies cough, congestion, shortness of breath, DOWLING, wheezing or stridor.   Cardiovascular: Denies precordial pain, palpitations, edema or PND  Gastrointestinal: Denies poor appetite, indigestion, abdominal pain or blood in stool  Genitourinary: Denies hematuria, dysuria, increased urinary frequency  Musculoskeletal: Denies joint pain or swelling from muscles or joints  Neurologic: Denies tremor, paresthesias, headache, or sensory motor disturbance  Psychiatric: Denies confusion, insomnia, depression  Integumentray: Denies rash, itching or ulcers. Hematologic: Denies easy bruising, bleeding     PHYSICAL EXAMINATION      Vitals:    01/28/22 1059   BP: 119/60   Pulse: 72   Weight: 212 lb 12.8 oz (96.5 kg)   Height: 5' 4\" (1.626 m)     General: Well developed, in no acute distress. HEENT: No jaundice, oral mucosa moist, no oral ulcers  Neck: Supple, no stiffness, no lymphadenopathy, supple  Heart:  Normal S1/S2 negative S3 or S4. Regular, no murmur, gallop or rub, no jugular venous distention  Respiratory: Clear bilaterally x 4, no wheezing or rales  Extremities:  No edema, normal cap refill, no cyanosis. Musculoskeletal: No clubbing, no deformities  Neuro: A&Ox3, speech clear, gait stable, cooperative, no focal neurologic deficits  Skin: Skin color is normal. No rashes or lesions. Non diaphoretic, moist.             DIAGNOSTIC DATA     1. Echo   2/2/18- EF 65%, no WMAs.  Wall thickness mildly increased.  Prosthesis MV w/nml fx.     4/20/16- EF 60-65%, MV annular ring prosthesis exhibit normal function, TR mild   1/12/15- EF 55-60%, TR mild/mod, Pulm HTN mild ,Hx of TRR robotic assisted MVR using 29mm ATS adjustable ring There was mild regurgitation. 10/14/11: LVH, EF 55-60%   4/21/21-EF 60 - 65%, LAE, Mitral valve repaired with annuloplasty ring. Mitral valve mean gradient is 3.8 mmHg. S/P 20 mm ATS Ring model 735AF-29, mild TR, mild Pulm HTN, PAP 41 mmHg    2. Loop   1/27/15 - SB 45-48 (only base line recorded)     3 . Cardiac catheterization   (10/14/10) : No significant CAD with severe MR   History TRR robotic assisted MVR using 29 mm ATS adjustable ring model [id number]-29     4.  Cholesterol profile   1/12/15: , HDL 58, ,    1/27/16: , HDL 57, LDL 90,    1/12/17- , HDL 49, LDL 90,    6/22/17- , HDL 52, LDL 85,    5/22/19- , HDL 52, ,    9/14/20- , HDL 44, LDL 99,    6/9/21- , HDL 47, LDL 67, TG 88 LABORATORY DATA            Lab Results   Component Value Date/Time    WBC 7.5 06/28/2021 02:50 PM    Hemoglobin (POC) 12.9 12/24/2012 06:26 AM    HGB 12.5 06/28/2021 02:50 PM    Hematocrit (POC) 38 12/24/2012 06:26 AM    HCT 38.7 06/28/2021 02:50 PM    PLATELET 188 06/95/1006 02:50 PM    MCV 82.9 06/28/2021 02:50 PM      Lab Results   Component Value Date/Time    Sodium 141 11/02/2021 12:00 AM    Potassium 4.5 11/02/2021 12:00 AM    Chloride 99 11/02/2021 12:00 AM    CO2 28 11/02/2021 12:00 AM    Anion gap 10 11/19/2010 03:55 AM    Glucose 103 (H) 11/02/2021 12:00 AM    BUN 18 11/02/2021 12:00 AM    Creatinine 0.73 11/02/2021 12:00 AM    BUN/Creatinine ratio 25 11/02/2021 12:00 AM    GFR est  11/02/2021 12:00 AM    GFR est non-AA 87 11/02/2021 12:00 AM    Calcium 10.1 11/02/2021 12:00 AM    Bilirubin, total 0.4 04/21/2021 09:11 AM    Alk. phosphatase 80 04/21/2021 09:11 AM    Protein, total 7.3 04/21/2021 09:11 AM    Albumin 4.9 (H) 04/21/2021 09:11 AM    Globulin 3.2 06/18/2011 10:10 PM    A-G Ratio 2.0 04/21/2021 09:11 AM    ALT (SGPT) 22 04/21/2021 09:11 AM           ASSESSMENT/RECOMMENDATIONS:.      1. Mitral valve repair  -Overall she is doing well with no issues her last echo was in April 2021angioplasty ring was functioning normally. 2. Dyslipidemia  -LDL is at goal now at 67 is excellent  3. Hypertension  - Blood pressure is excellent at this time on chlorthalidone and lisinopril and no adjustments  -We will reduce her lisinopril to 5 mg a day and recheck her blood pressure in 6 to 8 weeks for a virtual visit  -Continue low-sodium diet  4. BILLY  -States she is compliant  5. Diabetes mellitus  - last HGB A1C she says is 7.4  -Continue exercise and reduce carbohydrate intake  6. Return in 6 mo or PRN. Orders Placed This Encounter    cyanocobalamin (Vitamin B-12) 1,000 mcg tablet     Sig: Take 1,000 mcg by mouth daily.           Follow-up and Dispositions  ·   Return in about 6 months (around 7/28/2022). I have discussed the diagnosis with  Olivia Barth and the intended plan as seen in the above orders. Questions were answered concerning future plans. I have discussed medication side effects and warnings with the patient as well. Thank you,  Lei Liu MD for involving me in the care of  Olivia Brath. Please do not hesitate to contact me for further questions/concerns. ADDENDUM:    (4/20/18): Ms. Tony is a low cardiac risk for a low risk procedure and may proceed. No cardiac testing is  needed. Please call if you have any additional questions. Kwame Pearce MD, [address] [place] [address] Rd[address]      [facility], [address] [place] [address]      [phone] / [phone] Fax

## 2022-02-02 NOTE — PROGRESS NOTES
Reviewed. Patient is to be contacted by Radiology. Products Recommended: neutrogena sheer zinc, cerave am, la roche posay toleriane uv General Sunscreen Counseling: I recommended a broad spectrum sunscreen with a SPF of 30 or higher.  I explained that SPF 30 sunscreens block approximately 97 percent of the sun's harmful rays.  Sunscreens should be applied at least 15 minutes prior to expected sun exposure and then every 2 hours after that as long as sun exposure continues. If swimming or exercising sunscreen should be reapplied every 45 minutes to an hour after getting wet or sweating.  One ounce, or the equivalent of a shot glass full of sunscreen, is adequate to protect the skin not covered by a bathing suit. I also recommended a lip balm with a sunscreen as well. Sun protective clothing can be used in lieu of sunscreen but must be worn the entire time you are exposed to the sun's rays. Detail Level: Zone

## 2022-02-25 ENCOUNTER — VIRTUAL VISIT (OUTPATIENT)
Dept: CARDIOLOGY CLINIC | Age: 67
End: 2022-02-25
Payer: MEDICARE

## 2022-02-25 DIAGNOSIS — I10 ESSENTIAL HYPERTENSION: Primary | ICD-10-CM

## 2022-02-25 DIAGNOSIS — E78.5 DYSLIPIDEMIA: ICD-10-CM

## 2022-02-25 DIAGNOSIS — Z98.890 H/O MITRAL VALVE REPAIR: ICD-10-CM

## 2022-02-25 DIAGNOSIS — E66.09 CLASS 2 OBESITY DUE TO EXCESS CALORIES WITH BODY MASS INDEX (BMI) OF 39.0 TO 39.9 IN ADULT, UNSPECIFIED WHETHER SERIOUS COMORBIDITY PRESENT: ICD-10-CM

## 2022-02-25 DIAGNOSIS — E66.01 SEVERE OBESITY (BMI 35.0-35.9 WITH COMORBIDITY) (HCC): ICD-10-CM

## 2022-02-25 DIAGNOSIS — G47.33 OSA ON CPAP: ICD-10-CM

## 2022-02-25 DIAGNOSIS — Z99.89 OSA ON CPAP: ICD-10-CM

## 2022-02-25 PROCEDURE — 1090F PRES/ABSN URINE INCON ASSESS: CPT | Performed by: SPECIALIST

## 2022-02-25 PROCEDURE — G8756 NO BP MEASURE DOC: HCPCS | Performed by: SPECIALIST

## 2022-02-25 PROCEDURE — G8427 DOCREV CUR MEDS BY ELIG CLIN: HCPCS | Performed by: SPECIALIST

## 2022-02-25 PROCEDURE — 99214 OFFICE O/P EST MOD 30 MIN: CPT | Performed by: SPECIALIST

## 2022-02-25 PROCEDURE — G8432 DEP SCR NOT DOC, RNG: HCPCS | Performed by: SPECIALIST

## 2022-02-25 PROCEDURE — G8399 PT W/DXA RESULTS DOCUMENT: HCPCS | Performed by: SPECIALIST

## 2022-02-25 PROCEDURE — 1101F PT FALLS ASSESS-DOCD LE1/YR: CPT | Performed by: SPECIALIST

## 2022-02-25 PROCEDURE — 3017F COLORECTAL CA SCREEN DOC REV: CPT | Performed by: SPECIALIST

## 2022-02-25 RX ORDER — LISINOPRIL 10 MG/1
5 TABLET ORAL DAILY
COMMUNITY
End: 2022-09-02 | Stop reason: DRUGHIGH

## 2022-02-25 NOTE — PROGRESS NOTES
VIRTUAL VISIT DOCUMENTATION     Pursuant to the emergency declaration under the [facility] waiver authority and the [employer] Act, this Virtual  Visit was conducted, with patient's consent, to reduce the patient's risk of exposure to COVID-19 and provide continuity of care for an established patient. Services were provided through a video synchronous discussion virtually to substitute for in-person clinic visit. CHIEF COMPLAINT      Kori Barbosa is a 73 y.o. female who was seen by synchronous (real-time) audio-video technology on 2/25/2022. Patient is being seen today for hypertension     ASSESSMENT        ICD-10-CM ICD-9-CM    1. Essential hypertension  I10 401.9    2. Severe obesity (BMI 35.0-35.9 with comorbidity) ([facility])  E66.01 278.01     Z68.35 V85.35    3. BILLY on CPAP  G47.33 327.23     Z99.89 V46.8    4. H/O mitral valve repair  Z98.890 V15.1    5. Dyslipidemia  E78.5 272.4    6. Class 2 obesity due to excess calories with body mass index (BMI) of 39.0 to 39.9 in adult, unspecified whether serious comorbidity present  E66.09 278.00     Z68.39 V85.39           ASSESSMENT/RECOMMENDATIONS:.      1. Mitral valve repair  -Overall she is doing well with no issues her last echo was in April 2021angioplasty ring was functioning normally.  -We will repeat echo is intermittently but this point  2. Dyslipidemia  -LDL is at goal now at 67 is excellent  -Continue diet low in red meat  3. Hypertension  - Blood pressure is excellent at this time on chlorthalidone and lisinopril and no adjustments  -We will reduce her lisinopril to 5 mg a day and recheck her blood pressure in 6 to 8 weeks for a virtual visit  Her BP readings have been 131/70, 129/66, 130/71 these are at goal so continue on lisinopril 5 mg a day  -Continue low-sodium diet  4. BILLY  -States she is compliant  5.  Diabetes mellitus  - last HGB A1C she says is 7.4  -Continue exercise and reduce carbohydrate intake  6. Return in 6 mo or PRN. We discussed the expected course, resolution and complications of the diagnosis(es) in detail. Medication risks, benefits, costs, interactions, and alternatives were discussed as indicated. I advised her to contact the office if her condition worsens, changes or fails to improve as anticipated. She expressed understanding with the diagnosis(es) and plan    HISTORY OF PRESENTING ILLNESS      Nima Díaz is a 73 y.o. female  with hypertension, dyslipidemia, diabetes and mitral valve repair referred for 6 month follow up. Her BP has been good even after we reduced her lisinopril.she has pain in her pelvic pain. I do not think this is vascular and worse that she see her GYN first before we do any further testing. Cardiac risk factors: family history, dyslipidemia, diabetes, obesity, hypertension, post-menopausal  I have personally obtained the history from the patient. ACTIVE PROBLEM LIST     Patient Active Problem List    Diagnosis Date Noted    Hyperparathyroidism ([address] 07/09/2021    Diabetes ([address] 06/11/2021    Sleep apnea 06/11/2021    Primary hyperparathyroidism ([address] 06/11/2021    Hypercalcemia 06/11/2021    Vitamin D deficiency 06/11/2021    Severe obesity (BMI 35.0-39. 9) with comorbidity ([address] 04/20/2018    Mitral valve regurgitation 11/12/2010    Hypertension 11/12/2010    Hyperlipidemia 11/12/2010    Palpitations 11/12/2010           PAST MEDICAL HISTORY     Past Medical History:   Diagnosis Date    Adverse effect of anesthesia     DIFFICULTY WAKING     COVID-19 vaccine series completed     PFIZER  03/10/21 &03/31/21    Diabetes ([address]     History of mitral valve repair 2011    Hypertension     Mitral valve disorders(424.0)     Other ill-defined conditions(799.89)     mitral valve regurg    Sleep apnea with use of continuous positive airway pressure (CPAP)     USES CPAP    Unspecified adverse effect of anesthesia     difficulty waking           PAST SURGICAL HISTORY     Past Surgical History:   Procedure Laterality Date    CARDIAC CATHETERIZATION      HX  SECTION  1987    c section    HX COLONOSCOPY  2016    HX GI  10/2011    CAMERON    HX MITRAL VALVULOPLASTY  2010    HX MYOMECTOMY  1984    HX PARTIAL HYSTERECTOMY      [place] ABDOMEN SURGERY [address] UNLISTED  1980s    laparotomy    [place] CHEST SURGERY PROCEDURE UNLISTED      mitral valve repair          ALLERGIES     Allergies   Allergen Reactions    Other Plant, Animal, Environmental Other (comments)     hayfever          FAMILY HISTORY     Family History   Problem Relation Age of Onset    Heart Disease Mother     Cancer Mother         THYROID    Hypertension Mother     Alzheimer's Disease Mother     Hypertension Father     Diabetes Father     Cancer Sister         BREAST     Hypertension Sister     Breast Cancer Sister 46    Hypertension Brother     Cancer Maternal Aunt     Diabetes Maternal Aunt     Other Maternal Aunt         multiple myeloma     Cancer Maternal Grandmother     Anesth Problems Neg Hx     negative for cardiac disease       SOCIAL HISTORY     Social History     Socioeconomic History    Marital status:    Occupational History    Occupation:      Employer: [employer]   Tobacco Use    Smoking status: Never Smoker    Smokeless tobacco: Never Used   Vaping Use    Vaping Use: Never used   Substance and Sexual Activity    Alcohol use: Yes     Alcohol/week: 3.0 standard drinks     Types: 3 Glasses of wine per week     Comment: 3-4 week     Drug use: No    Sexual activity: Not Currently         MEDICATIONS     Current Outpatient Medications   Medication Sig    lisinopriL (PRINIVIL, ZESTRIL) 10 mg tablet Take 5 mg by mouth daily.  cyanocobalamin (Vitamin B-12) 1,000 mcg tablet Take 1,000 mcg by mouth daily.     rosuvastatin (CRESTOR) 20 mg tablet TAKE 1 TABLET BY MOUTH EVERY DAY AT NIGHT    metFORMIN (GLUCOPHAGE) 1,000 mg tablet Take 1,000 mg by mouth two (2) times daily (with meals).  chlorthalidone (HYGROTEN) 25 mg tablet TAKE 1 TABLET BY MOUTH EVERY DAY    cpap machine kit by Does Not Apply route.  ascorbic acid, vitamin C, (VITAMIN C) 1,000 mg tablet Take  by mouth daily.  multivitamin (ONE A DAY) tablet Take 1 Tab by mouth daily. No current facility-administered medications for this visit. I have reviewed the nurses notes, vitals, problem list, allergy list, medical history, family, social history and medications. REVIEW OF SYMPTOMS     Constitutional: Negative for fever, chills, malaise/fatigue and diaphoresis. Respiratory: Negative for cough, hemoptysis, sputum production, shortness of breath and wheezing. Cardiovascular: Negative for chest pain, palpitations, orthopnea, claudication, leg swelling and PND. Gastrointestinal: Negative for heartburn, nausea, vomiting, blood in stool and melena. Genitourinary: Negative for dysuria and flank pain. Musculoskeletal: Negative for joint pain and back pain. Skin: Negative for rash. Neurological: Negative for focal weakness, seizures, loss of consciousness, weakness and headaches. Endo/Heme/Allergies: Negative for abnormal bleeding. Psychiatric/Behavioral: Negative for memory loss. PHYSICAL EXAMINATION      Due to this being a TeleHealth evaluation, many elements of the physical examination are unable to be assessed. General: Well developed, in no acute distress, cooperative and alert  HEENT: Pupils equal/round. No marked JVD visible on video. Respiratory: No audible wheezing, no signs of respiratory distress, lips non cyanotic  Extremities:  No edema  Neuro: A&Ox3, speech clear, no facial droop, answering questions appropriately  Skin: Skin color is normal. No rashes or lesions.  Non diaphoretic on visible skin during exam       DIAGNOSTIC DATA      1. Echo   2/2/18- EF 65%, no WMAs.  Wall thickness mildly increased.  Prosthesis MV w/nml fx.     4/20/16- EF 60-65%, MV annular ring prosthesis exhibit normal function, TR mild   1/12/15- EF 55-60%, TR mild/mod, Pulm HTN mild ,Hx of TRR robotic assisted MVR using 29mm ATS adjustable ring There was mild regurgitation. 10/14/11: LVH, EF 55-60%   4/21/21-EF 60 - 65%, LAE, Mitral valve repaired with annuloplasty ring. Mitral valve mean gradient is 3.8 mmHg. S/P 20 mm ATS Ring model 735AF-29, mild TR, mild Pulm HTN, PAP 41 mmHg    2. Loop   1/27/15 - SB 45-48 (only base line recorded)     3 . Cardiac catheterization   (10/14/10) : No significant CAD with severe MR   History TRR robotic assisted MVR using 29 mm ATS adjustable ring model 735AF-29     4. Cholesterol profile   1/12/15: , HDL 58, ,    1/27/16: , HDL 57, LDL 90,    1/12/17- , HDL 49, LDL 90,    6/22/17- , HDL 52, LDL 85,    5/22/19- , HDL 52, ,    9/14/20- , HDL 44, LDL 99,    6/9/21- , HDL 47, LDL 67, TG 88       LABORATORY DATA      Lab Results   Component Value Date/Time    WBC 7.5 06/28/2021 02:50 PM    Hemoglobin (POC) 12.9 12/24/2012 06:26 AM    HGB 12.5 06/28/2021 02:50 PM    Hematocrit (POC) 38 12/24/2012 06:26 AM    HCT 38.7 06/28/2021 02:50 PM    PLATELET 139 70/48/9786 02:50 PM    MCV 82.9 06/28/2021 02:50 PM      Lab Results   Component Value Date/Time    Sodium 141 11/02/2021 12:00 AM    Potassium 4.5 11/02/2021 12:00 AM    Chloride 99 11/02/2021 12:00 AM    CO2 28 11/02/2021 12:00 AM    Anion gap 10 11/19/2010 03:55 AM    Glucose 103 (H) 11/02/2021 12:00 AM    BUN 18 11/02/2021 12:00 AM    Creatinine 0.73 11/02/2021 12:00 AM    BUN/Creatinine ratio 25 11/02/2021 12:00 AM    GFR est  11/02/2021 12:00 AM    GFR est non-AA 87 11/02/2021 12:00 AM    Calcium 10.1 11/02/2021 12:00 AM    Bilirubin, total 0.4 04/21/2021 09:11 AM    Alk.  phosphatase 80 04/21/2021 09:11 AM Protein, total 7.3 04/21/2021 09:11 AM    Albumin 4.9 (H) 04/21/2021 09:11 AM    Globulin 3.2 06/18/2011 10:10 PM    A-G Ratio 2.0 04/21/2021 09:11 AM    ALT (SGPT) 22 04/21/2021 09:11 AM             FOLLOW-UP     Follow-up and Dispositions    · Return in about 6 months (around 8/25/2022). Patient was made aware and verbalized understanding that an appointment will be scheduled for them for a virtual visit and/or office visit within the above time frame. Patient understanding his/her responsibility to call and change time/date if he/she so chooses. Thank you, Varinder Harden MD for allowing me to participate in the care of Sandro Barrios. Please do not hesitate to contact me for further questions/concerns. Greater than 20 minutes was spent in direct video patient care, planning and chart review. This visit was conducted using [email or web address] services.        Loy Khalil MD    [address]        [phone] / [phone] Fax       [facility], [address] [address]  Steve Francisco  [phone] / [phone] Fax

## 2022-03-09 RX ORDER — ROSUVASTATIN CALCIUM 20 MG/1
TABLET, COATED ORAL
Qty: 30 TABLET | Refills: 5 | Status: SHIPPED | OUTPATIENT
Start: 2022-03-09 | End: 2022-07-27

## 2022-03-18 PROBLEM — E21.0 PRIMARY HYPERPARATHYROIDISM (HCC): Status: ACTIVE | Noted: 2021-06-11

## 2022-03-18 PROBLEM — E66.01 SEVERE OBESITY (BMI 35.0-39.9) WITH COMORBIDITY (HCC): Status: ACTIVE | Noted: 2018-04-20

## 2022-03-19 PROBLEM — G47.30 SLEEP APNEA: Status: ACTIVE | Noted: 2021-06-11

## 2022-03-19 PROBLEM — E21.3 HYPERPARATHYROIDISM (HCC): Status: ACTIVE | Noted: 2021-07-09

## 2022-03-19 PROBLEM — E55.9 VITAMIN D DEFICIENCY: Status: ACTIVE | Noted: 2021-06-11

## 2022-03-20 PROBLEM — E83.52 HYPERCALCEMIA: Status: ACTIVE | Noted: 2021-06-11

## 2022-03-20 PROBLEM — E11.9 DIABETES (HCC): Status: ACTIVE | Noted: 2021-06-11

## 2022-07-27 RX ORDER — ROSUVASTATIN CALCIUM 20 MG/1
TABLET, COATED ORAL
Qty: 90 TABLET | Refills: 0 | Status: SHIPPED | OUTPATIENT
Start: 2022-07-27

## 2022-09-02 ENCOUNTER — OFFICE VISIT (OUTPATIENT)
Dept: CARDIOLOGY CLINIC | Age: 67
End: 2022-09-02
Payer: MEDICARE

## 2022-09-02 VITALS
HEIGHT: 64 IN | SYSTOLIC BLOOD PRESSURE: 118 MMHG | BODY MASS INDEX: 35.17 KG/M2 | HEART RATE: 68 BPM | WEIGHT: 206 LBS | DIASTOLIC BLOOD PRESSURE: 60 MMHG

## 2022-09-02 DIAGNOSIS — Z98.890 H/O MITRAL VALVE REPAIR: ICD-10-CM

## 2022-09-02 DIAGNOSIS — Z99.89 OSA ON CPAP: ICD-10-CM

## 2022-09-02 DIAGNOSIS — E66.01 SEVERE OBESITY (BMI 35.0-35.9 WITH COMORBIDITY) (HCC): ICD-10-CM

## 2022-09-02 DIAGNOSIS — E78.5 DYSLIPIDEMIA: ICD-10-CM

## 2022-09-02 DIAGNOSIS — I10 ESSENTIAL HYPERTENSION: Primary | ICD-10-CM

## 2022-09-02 DIAGNOSIS — G47.33 OSA ON CPAP: ICD-10-CM

## 2022-09-02 PROCEDURE — 93000 ELECTROCARDIOGRAM COMPLETE: CPT | Performed by: SPECIALIST

## 2022-09-02 PROCEDURE — 1090F PRES/ABSN URINE INCON ASSESS: CPT | Performed by: SPECIALIST

## 2022-09-02 PROCEDURE — G8754 DIAS BP LESS 90: HCPCS | Performed by: SPECIALIST

## 2022-09-02 PROCEDURE — G8752 SYS BP LESS 140: HCPCS | Performed by: SPECIALIST

## 2022-09-02 PROCEDURE — G8427 DOCREV CUR MEDS BY ELIG CLIN: HCPCS | Performed by: SPECIALIST

## 2022-09-02 PROCEDURE — 1123F ACP DISCUSS/DSCN MKR DOCD: CPT | Performed by: SPECIALIST

## 2022-09-02 PROCEDURE — G8432 DEP SCR NOT DOC, RNG: HCPCS | Performed by: SPECIALIST

## 2022-09-02 PROCEDURE — G8536 NO DOC ELDER MAL SCRN: HCPCS | Performed by: SPECIALIST

## 2022-09-02 PROCEDURE — G8417 CALC BMI ABV UP PARAM F/U: HCPCS | Performed by: SPECIALIST

## 2022-09-02 PROCEDURE — 99214 OFFICE O/P EST MOD 30 MIN: CPT | Performed by: SPECIALIST

## 2022-09-02 PROCEDURE — 3017F COLORECTAL CA SCREEN DOC REV: CPT | Performed by: SPECIALIST

## 2022-09-02 PROCEDURE — G8399 PT W/DXA RESULTS DOCUMENT: HCPCS | Performed by: SPECIALIST

## 2022-09-02 PROCEDURE — 1101F PT FALLS ASSESS-DOCD LE1/YR: CPT | Performed by: SPECIALIST

## 2022-09-02 RX ORDER — LISINOPRIL 5 MG/1
TABLET ORAL DAILY
COMMUNITY

## 2022-09-02 NOTE — PATIENT INSTRUCTIONS

## 2022-09-02 NOTE — PROGRESS NOTES
CARDIOLOGY OFFICE NOTE    Kwame Ruiz MD, [address], [place], [address] [facility]  Phone [phone]; Fax [phone]  Mobile [phone]   Voice Mail [phone]    Primary care: Romana Moritz, MD       ATTENTION:   This medical record was transcribed using an electronic medical records/speech recognition system. Although proofread, it may and can contain electronic, spelling and other errors. Corrections may be executed at a later time. Please feel free to contact us for any clarifications as needed. Freddy Bautista is a 78 y.o. female with  referred for hypertension dyslipidemia mitral valve repair in 2011          Cardiac risk factors: family history, dyslipidemia, diabetes, obesity, hypertension, post-menopausal  I have personally obtained the history from the patient. HISTORY OF PRESENTING ILLNESS    Ms./Mr. Freddy Bautista is a 78  y.o. female  with hypertension, dyslipidemia, diabetes and mitral valve repair occurred in 2011 done at [facility]. referred for 6 month follow up. Overall she has been doing well. She is very busy taking care of others so it may have neglected herself slightly. ACTIVE PROBLEM LIST     Patient Active Problem List    Diagnosis Date Noted    Hyperparathyroidism ([address] 07/09/2021    Diabetes ([address] 06/11/2021    Sleep apnea 06/11/2021    Primary hyperparathyroidism ([address] 06/11/2021    Hypercalcemia 06/11/2021    Vitamin D deficiency 06/11/2021    Severe obesity (BMI 35.0-39. 9) with comorbidity ([address] 04/20/2018    Mitral valve regurgitation 11/12/2010    Hypertension 11/12/2010    Hyperlipidemia 11/12/2010    Palpitations 11/12/2010           PAST MEDICAL HISTORY     Past Medical History:   Diagnosis Date    Adverse effect of anesthesia     DIFFICULTY WAKING     COVID-19 vaccine series completed     PFIZER  03/10/21 &03/31/21    Diabetes ([address]     History of mitral valve repair 2011    Hypertension     Mitral valve disorders(424.0)     Other ill-defined conditions(799.89)     mitral valve regurg    Sleep apnea with use of continuous positive airway pressure (CPAP)     USES CPAP    Unspecified adverse effect of anesthesia     difficulty waking           PAST SURGICAL HISTORY     Past Surgical History:   Procedure Laterality Date    CARDIAC CATHETERIZATION      HX  SECTION  1987    c section    HX COLONOSCOPY  2016    HX GI  10/2011    CAMERON    HX MITRAL VALVULOPLASTY  2010    HX MYOMECTOMY  1984    HX PARTIAL HYSTERECTOMY      [place] ABDOMEN SURGERY PROC UNLISTED  1980s    laparotomy    [place] CHEST SURGERY PROCEDURE UNLISTED      mitral valve repair          ALLERGIES     Allergies   Allergen Reactions    Other Plant, Animal, Environmental Other (comments)     hayfever          FAMILY HISTORY     Family History   Problem Relation Age of Onset    Heart Disease Mother     Cancer Mother         THYROID    Hypertension Mother     Alzheimer's Disease Mother     Hypertension Father     Diabetes Father     Cancer Sister         BREAST     Hypertension Sister     Breast Cancer Sister 46    Hypertension Brother     Cancer Maternal Aunt     Diabetes Maternal Aunt     Other Maternal Aunt         multiple myeloma     Cancer Maternal Grandmother     Anesth Problems Neg Hx     negative for cardiac disease       SOCIAL HISTORY     Social History     Socioeconomic History    Marital status:    Occupational History    Occupation:      Employer: [employer]   Tobacco Use    Smoking status: Never    Smokeless tobacco: Never   Vaping Use    Vaping Use: Never used   Substance and Sexual Activity    Alcohol use: Yes     Alcohol/week: 3.0 standard drinks     Types: 3 Glasses of wine per week     Comment: 3-4 week     Drug use: No    Sexual activity: Not Currently         MEDICATIONS     Current Outpatient Medications   Medication Sig    lisinopriL (PRINIVIL, ZESTRIL) 5 mg tablet Take  by mouth daily. rosuvastatin (CRESTOR) 20 mg tablet TAKE 1 TABLET BY MOUTH EVERY DAY AT NIGHT    cyanocobalamin 1,000 mcg tablet Take 1,000 mcg by mouth daily. metFORMIN (GLUCOPHAGE) 1,000 mg tablet Take 1,000 mg by mouth two (2) times daily (with meals). chlorthalidone (HYGROTEN) 25 mg tablet TAKE 1 TABLET BY MOUTH EVERY DAY    cpap machine kit by Does Not Apply route. ascorbic acid, vitamin C, (VITAMIN C) 1,000 mg tablet Take  by mouth daily. multivitamin (ONE A DAY) tablet Take 1 Tab by mouth daily. No current facility-administered medications for this visit. I have reviewed the nurses notes, vitals, problem list, allergy list, medical history, family, social history and medications. REVIEW OF SYMPTOMS    As per HPI  General: Pt denies excessive weight gain or loss. Pt is able to conduct ADL's  HEENT: Denies blurred vision, headaches, hearing loss, epistaxis and difficulty swallowing. Respiratory: Denies cough, congestion, shortness of breath, DOWLING, wheezing or stridor. Cardiovascular: Denies precordial pain, palpitations, edema or PND  Gastrointestinal: Denies poor appetite, indigestion, abdominal pain or blood in stool  Genitourinary: Denies hematuria, dysuria, increased urinary frequency  Musculoskeletal: Denies joint pain or swelling from muscles or joints  Neurologic: Denies tremor, paresthesias, headache, or sensory motor disturbance  Psychiatric: Denies confusion, insomnia, depression  Integumentray: Denies rash, itching or ulcers. Hematologic: Denies easy bruising, bleeding     PHYSICAL EXAMINATION      Vitals:    09/02/22 1041   BP: 118/60   Pulse: 68   Weight: 206 lb (93.4 kg)   Height: 5' 4\" (1.626 m)     General: Well developed, in no acute distress. HEENT: No jaundice, oral mucosa moist, no oral ulcers  Neck: Supple, no stiffness, no lymphadenopathy, supple  Heart:  Normal S1/S2 negative S3 or S4.  Regular, no murmur, gallop or rub, no jugular venous distention  Respiratory: Clear bilaterally x 4, no wheezing or rales  Extremities:  No edema, normal cap refill, no cyanosis. Musculoskeletal: No clubbing, no deformities  Neuro: A&Ox3, speech clear, gait stable, cooperative, no focal neurologic deficits  Skin: Skin color is normal. No rashes or lesions. Non diaphoretic, moist.  Vascular: 2+ pulses symmetric in all extremities  Abdomen:   Soft, non-tender, bowel sounds are active. EKG: Date: (9/2/2022)-sinus rhythm     DIAGNOSTIC DATA     1. Echo   2/2/18- EF 65%, no WMAs. Wall thickness mildly increased. Prosthesis MV w/nml fx.     4/20/16- EF 60-65%, MV annular ring prosthesis exhibit normal function, TR mild   1/12/15- EF 55-60%, TR mild/mod, Pulm HTN mild ,Hx of TRR robotic assisted MVR using 29mm ATS adjustable ring There was mild regurgitation. 10/14/11: LVH, EF 55-60%   4/21/21-EF 60 - 65%, LAE, Mitral valve repaired with annuloplasty ring. Mitral valve mean gradient is 3.8 mmHg. S/P 20 mm ATS Ring model 735AF-29, mild TR, mild Pulm HTN, PAP 41 mmHg    2. Loop   1/27/15 - SB 45-48 (only base line recorded)     3 . Cardiac catheterization   (10/14/10) : No significant CAD with severe MR   History TRR robotic assisted MVR using 29 mm ATS adjustable ring model 735AF-29     4.  Cholesterol profile    5/22/19- , HDL 52, ,    9/14/20- , HDL 44, LDL 99,    6/9/21- , HDL 47, LDL 67, TG 88         LABORATORY DATA       Lab Results   Component Value Date/Time    WBC 7.5 06/28/2021 02:50 PM    Hemoglobin (POC) 12.9 12/24/2012 06:26 AM    HGB 12.5 06/28/2021 02:50 PM    Hematocrit (POC) 38 12/24/2012 06:26 AM    HCT 38.7 06/28/2021 02:50 PM    PLATELET 688 88/21/1144 02:50 PM    MCV 82.9 06/28/2021 02:50 PM      Lab Results   Component Value Date/Time    Sodium 141 11/02/2021 12:00 AM    Potassium 4.5 11/02/2021 12:00 AM    Chloride 99 11/02/2021 12:00 AM    CO2 28 11/02/2021 12:00 AM    Anion gap 10 11/19/2010 03:55 AM    Glucose 103 (H) 11/02/2021 12:00 AM    BUN 18 11/02/2021 12:00 AM    Creatinine 0.73 11/02/2021 12:00 AM    BUN/Creatinine ratio 25 11/02/2021 12:00 AM    GFR est  11/02/2021 12:00 AM    GFR est non-AA 87 11/02/2021 12:00 AM    Calcium 10.1 11/02/2021 12:00 AM    Bilirubin, total 0.4 04/21/2021 09:11 AM    Alk. phosphatase 80 04/21/2021 09:11 AM    Protein, total 7.3 04/21/2021 09:11 AM    Albumin 4.9 (H) 04/21/2021 09:11 AM    Globulin 3.2 06/18/2011 10:10 PM    A-G Ratio 2.0 04/21/2021 09:11 AM    ALT (SGPT) 22 04/21/2021 09:11 AM            ICD-10-CM ICD-9-CM   1. Essential hypertension  I10 401.9   2. Severe obesity (BMI 35.0-35.9 with comorbidity) ([facility])  E66.01 278.01    Z68.35 V85.35   3. H/O mitral valve repair  Z98.890 V15.1   4. Dyslipidemia  E78.5 272.4   5. BILLY on CPAP  G47.33 327.23    Z99.89 V46.8          ASSESSMENT/RECOMMENDATIONS:.      1. Mitral valve repair  -Last echo was April 2021  -Repeat echo    2. Dyslipidemia  -LDL is 67 as of June 2021  -Needs updated lipids and I presume she will be seeing her primary care soon    3. Hypertension  -BP is normal at 118/60 is at goal currently taking lisinopril 5 mg as well as chlorthalidone 25 mg  -Courage diet low in sodium    4. BILLY  -States she is compliant    5. Diabetes mellitus  -Hemoglobin A1c goal should be under 7    6. Obesity  -work on weight loss  -semiglutide 3 mg ;1/2 tablet for 7 days then increse the dose to full 3 mg. 6. Return in 2 mo or PRN. Orders Placed This Encounter    AMB POC EKG ROUTINE W/ 12 LEADS, INTER & REP     Order Specific Question:   Reason for Exam:     Answer:   htn    lisinopriL (PRINIVIL, ZESTRIL) 5 mg tablet     Sig: Take  by mouth daily. We discussed the expected course, resolution and complications of the diagnosis(es) in detail. Medication risks, benefits, costs, interactions, and alternatives were discussed as indicated. I advised him to contact the office if his condition worsens, changes or fails to improve as anticipated.  He expressed understanding with the diagnosis(es) and plan          Follow-up and Dispositions    Return in about 6 months (around 3/2/2023). I have discussed the diagnosis with  Edith Hay and the intended plan as seen in the above orders. Questions were answered concerning future plans. I have discussed medication side effects and warnings with the patient as well. Thank you,  Haja Granados MD for involving me in the care of  Edith Hay. Please do not hesitate to contact me for further questions/concerns. Kwame Pearce MD, [facility] [address]      [place] [address] [address]      [phone] / [phone] Fax

## 2022-09-02 NOTE — LETTER
9/5/2022    Patient: Miguel Pan   YOB: 1955   Date of Visit: 9/2/2022     Ronal Mir, [address] [place] [address] Drive [address]  Via Fax: [phone]    Dear Ronal Mir MD,      Thank you for referring Ms. Hodan Hough to [facility] for evaluation. My notes for this consultation are attached. If you have questions, please do not hesitate to call me. I look forward to following your patient along with you.       Sincerely,    Emely Lamar MD

## 2022-09-26 NOTE — ROUTINE PROCESS
No IV access for CTA. Needs to be rescheduled for early AM appointment and will needs US IV. Dr. Cleopatra Cantor office called.
all other ROS negative except as per HPI

## 2022-11-30 RX ORDER — ROSUVASTATIN CALCIUM 20 MG/1
TABLET, COATED ORAL
Qty: 90 TABLET | Refills: 1 | Status: SHIPPED | OUTPATIENT
Start: 2022-11-30

## 2022-12-06 RX ORDER — ROSUVASTATIN CALCIUM 20 MG/1
20 TABLET, COATED ORAL
Qty: 90 TABLET | Refills: 1 | Status: SHIPPED | OUTPATIENT
Start: 2022-12-06

## 2023-02-03 ENCOUNTER — TRANSCRIBE ORDER (OUTPATIENT)
Dept: SCHEDULING | Age: 68
End: 2023-02-03

## 2023-02-03 DIAGNOSIS — J32.0 CHRONIC MAXILLARY SINUSITIS: Primary | ICD-10-CM

## 2023-02-16 ENCOUNTER — HOSPITAL ENCOUNTER (OUTPATIENT)
Dept: CT IMAGING | Age: 68
Discharge: HOME | End: 2023-02-16
Attending: OTOLARYNGOLOGY
Payer: MEDICARE

## 2023-02-16 DIAGNOSIS — J32.0 CHRONIC MAXILLARY SINUSITIS: ICD-10-CM

## 2023-02-16 PROCEDURE — 70486 CT MAXILLOFACIAL W/O DYE: CPT

## 2023-05-22 RX ORDER — ROSUVASTATIN CALCIUM 20 MG/1
20 TABLET, COATED ORAL DAILY
Qty: 90 TABLET | Refills: 0 | Status: SHIPPED | OUTPATIENT
Start: 2023-05-22

## 2023-11-08 RX ORDER — ROSUVASTATIN CALCIUM 20 MG/1
TABLET, COATED ORAL
Qty: 90 TABLET | Refills: 0 | Status: SHIPPED | OUTPATIENT
Start: 2023-11-08

## 2024-02-06 DIAGNOSIS — I10 HYPERTENSION: ICD-10-CM

## 2024-02-06 DIAGNOSIS — I34.0 MITRAL VALVE REGURGITATION: Primary | ICD-10-CM

## 2024-02-07 ENCOUNTER — OFFICE VISIT (OUTPATIENT)
Age: 69
End: 2024-02-07
Payer: MEDICARE

## 2024-02-07 ENCOUNTER — ANCILLARY PROCEDURE (OUTPATIENT)
Age: 69
End: 2024-02-07
Payer: MEDICARE

## 2024-02-07 VITALS
DIASTOLIC BLOOD PRESSURE: 62 MMHG | BODY MASS INDEX: 36.37 KG/M2 | WEIGHT: 213 LBS | HEIGHT: 64 IN | SYSTOLIC BLOOD PRESSURE: 120 MMHG | HEART RATE: 84 BPM

## 2024-02-07 VITALS
BODY MASS INDEX: 36.37 KG/M2 | DIASTOLIC BLOOD PRESSURE: 62 MMHG | WEIGHT: 213 LBS | HEART RATE: 84 BPM | HEIGHT: 64 IN | SYSTOLIC BLOOD PRESSURE: 120 MMHG

## 2024-02-07 DIAGNOSIS — I10 ESSENTIAL (PRIMARY) HYPERTENSION: Primary | ICD-10-CM

## 2024-02-07 DIAGNOSIS — G47.33 OBSTRUCTIVE SLEEP APNEA (ADULT) (PEDIATRIC): ICD-10-CM

## 2024-02-07 DIAGNOSIS — E78.49 OTHER HYPERLIPIDEMIA: ICD-10-CM

## 2024-02-07 DIAGNOSIS — I10 HYPERTENSION: ICD-10-CM

## 2024-02-07 DIAGNOSIS — E66.01 MORBID (SEVERE) OBESITY DUE TO EXCESS CALORIES (HCC): ICD-10-CM

## 2024-02-07 DIAGNOSIS — I34.0 MITRAL VALVE REGURGITATION: ICD-10-CM

## 2024-02-07 LAB
ECHO AO ASC DIAM: 3 CM
ECHO AO ASCENDING AORTA INDEX: 1.49 CM/M2
ECHO AO ROOT DIAM: 3.4 CM
ECHO AO ROOT INDEX: 1.69 CM/M2
ECHO AV AREA PEAK VELOCITY: 2.8 CM2
ECHO AV AREA VTI: 2.8 CM2
ECHO AV AREA/BSA PEAK VELOCITY: 1.4 CM2/M2
ECHO AV AREA/BSA VTI: 1.4 CM2/M2
ECHO AV MEAN GRADIENT: 5 MMHG
ECHO AV MEAN VELOCITY: 1.1 M/S
ECHO AV PEAK GRADIENT: 9 MMHG
ECHO AV PEAK VELOCITY: 1.5 M/S
ECHO AV VELOCITY RATIO: 0.8
ECHO AV VTI: 28.6 CM
ECHO BSA: 2.09 M2
ECHO EST RA PRESSURE: 8 MMHG
ECHO LA DIAMETER INDEX: 2.19 CM/M2
ECHO LA DIAMETER: 4.4 CM
ECHO LA TO AORTIC ROOT RATIO: 1.29
ECHO LA VOL A-L A2C: 83 ML (ref 22–52)
ECHO LA VOL A-L A4C: 65 ML (ref 22–52)
ECHO LA VOL MOD A2C: 79 ML (ref 22–52)
ECHO LA VOL MOD A4C: 61 ML (ref 22–52)
ECHO LA VOLUME AREA LENGTH: 76 ML
ECHO LA VOLUME INDEX A-L A2C: 41 ML/M2 (ref 16–34)
ECHO LA VOLUME INDEX A-L A4C: 32 ML/M2 (ref 16–34)
ECHO LA VOLUME INDEX AREA LENGTH: 38 ML/M2 (ref 16–34)
ECHO LA VOLUME INDEX MOD A2C: 39 ML/M2 (ref 16–34)
ECHO LA VOLUME INDEX MOD A4C: 30 ML/M2 (ref 16–34)
ECHO LV E' LATERAL VELOCITY: 6 CM/S
ECHO LV E' SEPTAL VELOCITY: 4 CM/S
ECHO LV EF PHYSICIAN: 60 %
ECHO LV FRACTIONAL SHORTENING: 34 % (ref 28–44)
ECHO LV INTERNAL DIMENSION DIASTOLE INDEX: 1.89 CM/M2
ECHO LV INTERNAL DIMENSION DIASTOLIC: 3.8 CM (ref 3.9–5.3)
ECHO LV INTERNAL DIMENSION SYSTOLIC INDEX: 1.24 CM/M2
ECHO LV INTERNAL DIMENSION SYSTOLIC: 2.5 CM
ECHO LV IVSD: 0.9 CM (ref 0.6–0.9)
ECHO LV MASS 2D: 101.1 G (ref 67–162)
ECHO LV MASS INDEX 2D: 50.3 G/M2 (ref 43–95)
ECHO LV POSTERIOR WALL DIASTOLIC: 0.9 CM (ref 0.6–0.9)
ECHO LV RELATIVE WALL THICKNESS RATIO: 0.47
ECHO LVOT AREA: 3.5 CM2
ECHO LVOT AV VTI INDEX: 0.79
ECHO LVOT DIAM: 2.1 CM
ECHO LVOT MEAN GRADIENT: 3 MMHG
ECHO LVOT PEAK GRADIENT: 6 MMHG
ECHO LVOT PEAK VELOCITY: 1.2 M/S
ECHO LVOT STROKE VOLUME INDEX: 39.1 ML/M2
ECHO LVOT SV: 78.6 ML
ECHO LVOT VTI: 22.7 CM
ECHO MV A VELOCITY: 1.46 M/S
ECHO MV AREA PHT: 1.9 CM2
ECHO MV AREA VTI: 1.8 CM2
ECHO MV E DECELERATION TIME (DT): 395.8 MS
ECHO MV E VELOCITY: 1.32 M/S
ECHO MV E/A RATIO: 0.9
ECHO MV E/E' LATERAL: 22
ECHO MV E/E' RATIO (AVERAGED): 27.5
ECHO MV LVOT VTI INDEX: 1.97
ECHO MV MAX VELOCITY: 1.4 M/S
ECHO MV MEAN GRADIENT: 3 MMHG
ECHO MV MEAN VELOCITY: 0.8 M/S
ECHO MV PEAK GRADIENT: 8 MMHG
ECHO MV PRESSURE HALF TIME (PHT): 114.8 MS
ECHO MV VTI: 44.7 CM
ECHO RA AREA 4C: 20 CM2
ECHO RA END SYSTOLIC VOLUME APICAL 4 CHAMBER INDEX BSA: 30 ML/M2
ECHO RA VOLUME: 61 ML
ECHO RIGHT VENTRICULAR SYSTOLIC PRESSURE (RVSP): 44 MMHG
ECHO RV INTERNAL DIMENSION: 3.5 CM
ECHO RV TAPSE: 1.9 CM (ref 1.7–?)
ECHO TV REGURGITANT MAX VELOCITY: 2.99 M/S
ECHO TV REGURGITANT PEAK GRADIENT: 36 MMHG

## 2024-02-07 PROCEDURE — 93005 ELECTROCARDIOGRAM TRACING: CPT | Performed by: SPECIALIST

## 2024-02-07 PROCEDURE — 1123F ACP DISCUSS/DSCN MKR DOCD: CPT | Performed by: SPECIALIST

## 2024-02-07 PROCEDURE — 3074F SYST BP LT 130 MM HG: CPT | Performed by: SPECIALIST

## 2024-02-07 PROCEDURE — 93306 TTE W/DOPPLER COMPLETE: CPT | Performed by: SPECIALIST

## 2024-02-07 PROCEDURE — 3078F DIAST BP <80 MM HG: CPT | Performed by: SPECIALIST

## 2024-02-07 PROCEDURE — 99214 OFFICE O/P EST MOD 30 MIN: CPT | Performed by: SPECIALIST

## 2024-02-07 PROCEDURE — 93010 ELECTROCARDIOGRAM REPORT: CPT | Performed by: SPECIALIST

## 2024-02-07 RX ORDER — VITAMIN B COMPLEX
1 CAPSULE ORAL DAILY
COMMUNITY

## 2024-02-07 RX ORDER — OMEGA-3-ACID ETHYL ESTERS 1 G/1
1 CAPSULE, LIQUID FILLED ORAL DAILY
COMMUNITY

## 2024-02-07 RX ORDER — ASPIRIN 81 MG/1
81 TABLET ORAL PRN
COMMUNITY

## 2024-02-07 RX ORDER — MULTIVITAMIN
1 TABLET ORAL DAILY
COMMUNITY

## 2024-02-07 NOTE — PROGRESS NOTES
had concerns including Hypertension, Sleep Apnea, and Cholesterol Problem.    Vitals:    24 1529   BP: 120/62   Pulse: 84   Weight: 96.6 kg (213 lb)   Height: 1.626 m (5' 4\")        Chest pain No    Refills No        1. Have you been to the ER, urgent care clinic since your last visit? No       Hospitalized since your last visit? No       Where?        Date?      Had an episode of numbness in her right arm last week.         NAME Guillermina GÓMEZ    1955      MRN    [id number]      LAST OFFICE APPOINTMENT: Visit date not found     DIAGNOSIS    ICD-10-CM    1. Essential (primary) hypertension  I10 EKG 12 Lead      2. Morbid (severe) obesity due to excess calories (HCC)  E66.01       3. Other hyperlipidemia  E78.49       4. Obstructive sleep apnea (adult) (pediatric)  G47.33           HOME MEDICATION  Current Outpatient Medications   Medication Sig    b complex vitamins capsule Take 1 capsule by mouth daily    vitamin D (CHOLECALCIFEROL) 50 MCG (2000 UT) CAPS capsule Take 1 capsule by mouth daily    Multiple Vitamin (DAILY VITES) TABS Take 1 tablet by mouth daily    vitamin E 200 units capsule Take 1 capsule by mouth daily    omega-3 acid ethyl esters (LOVAZA) 1 g capsule Take 1 capsule by mouth daily    aspirin 81 MG EC tablet Take 1 tablet by mouth as needed for Pain    rosuvastatin (CRESTOR) 20 MG tablet TAKE 1 TABLET EVERY DAY (NEEDS APPOINTMENT AND CURRENT LABS)    ascorbic acid (VITAMIN C) 1000 MG tablet Take 1.5 tablets by mouth daily    chlorthalidone (HYGROTON) 25 MG tablet TAKE 1 TABLET BY MOUTH EVERY DAY    lisinopril (PRINIVIL;ZESTRIL) 5 MG tablet Take by mouth daily    metFORMIN (GLUCOPHAGE) 1000 MG tablet Take by mouth 2 times daily (with meals)    cyanocobalamin 1000 MCG tablet Take by mouth daily     No current facility-administered medications for this visit.       VITAL SIGNS  Wt Readings from Last 3 Encounters:   24 96.6 kg (213 lb)   24 96.6 kg (213 lb)   22 
[facility]      [address], [address]     [place], [place]  [address] [phone] / [phone] Fax

## 2024-02-07 NOTE — PATIENT INSTRUCTIONS

## 2024-06-13 ENCOUNTER — TELEPHONE (OUTPATIENT)
Age: 69
End: 2024-06-13

## 2024-06-13 NOTE — TELEPHONE ENCOUNTER
Patient does not want to see NP. She states she is going to explore other options including seeing if she wants to see another cardiologist from this practice. Appt cancel for now.

## 2025-04-14 ENCOUNTER — OFFICE VISIT (OUTPATIENT)
Age: 70
End: 2025-04-14

## 2025-04-14 DIAGNOSIS — R41.89 COGNITIVE DECLINE: ICD-10-CM

## 2025-04-14 DIAGNOSIS — G31.84 MILD COGNITIVE IMPAIRMENT: Primary | ICD-10-CM

## 2025-04-14 NOTE — PROGRESS NOTES
[facility]  [facility]  NEUROLOGY CLINIC   [address]   [place] [address]   [phone] Office   [phone] Fax      Neuropsychology    Initial Diagnostic Interview Note      Referral:  Marcela Knight MD Vanessfabienne Henson is a 70 y.o. right handed   female  who was unaccompanied to the initial clinical interview on .  Please refer to her medical records for details pertaining to her history.   At the start of the appointment, I reviewed the patient's [facility] Epic Chart (including Media scanned in from previous providers) for the active Problem List, all pertinent Past Medical Hx, medications, recent radiologic and laboratory findings.  In addition, I reviewed pt's documented Immunization Record and Encounter History.     Chief Complaint: New patient, establish care, for neurocognitive and psychologic concerns, as outlined above.     2 years of college without history of previously diagnosed LD and/or receipt of special education services.  She lives in her own home and her adult son also lives there at times as he is a .  Her mother had dementia.  She has a strong family history of dementia.  The patient  has a past medical history of Adverse effect of anesthesia, COVID-19 vaccine series completed, Diabetes (HCC), History of mitral valve repair, Hypertension, Mitral valve disorders(424.0), Other ill-defined conditions(799.89), Sleep apnea with use of continuous positive airway pressure (CPAP), and Unspecified adverse effect of anesthesia.    She  has a past surgical history that includes Colonoscopy (); XR Cardiac Cath Procedure; Partial hysterectomy ();  section (1987); Chest surgery; gi (10/2011); pr unlisted procedure abdomen peritoneum & omentum (); Mitral valvuloplasty (2010); and myomectomy ().    She has no known background stroke, meningitis/encephalitis,

## 2025-04-22 ENCOUNTER — HOSPITAL ENCOUNTER (EMERGENCY)
Facility: HOSPITAL | Age: 70
Discharge: HOME | End: 2025-04-23
Attending: EMERGENCY MEDICINE
Payer: MEDICARE

## 2025-04-22 ENCOUNTER — APPOINTMENT (OUTPATIENT)
Facility: HOSPITAL | Age: 70
End: 2025-04-22
Payer: MEDICARE

## 2025-04-22 DIAGNOSIS — R07.9 ACUTE CHEST PAIN: Primary | ICD-10-CM

## 2025-04-22 PROCEDURE — 84484 ASSAY OF TROPONIN QUANT: CPT

## 2025-04-22 PROCEDURE — 71045 X-RAY EXAM CHEST 1 VIEW: CPT

## 2025-04-22 PROCEDURE — 83735 ASSAY OF MAGNESIUM: CPT

## 2025-04-22 PROCEDURE — 82550 ASSAY OF CK (CPK): CPT

## 2025-04-22 PROCEDURE — 84100 ASSAY OF PHOSPHORUS: CPT

## 2025-04-22 PROCEDURE — 80053 COMPREHEN METABOLIC PANEL: CPT

## 2025-04-22 PROCEDURE — 99285 EMERGENCY DEPT VISIT HI MDM: CPT

## 2025-04-22 PROCEDURE — 36415 COLL VENOUS BLD VENIPUNCTURE: CPT

## 2025-04-22 PROCEDURE — 93005 ELECTROCARDIOGRAM TRACING: CPT | Performed by: STUDENT IN AN ORGANIZED HEALTH CARE EDUCATION/TRAINING PROGRAM

## 2025-04-22 PROCEDURE — 85025 COMPLETE CBC W/AUTO DIFF WBC: CPT

## 2025-04-22 ASSESSMENT — PAIN - FUNCTIONAL ASSESSMENT: PAIN_FUNCTIONAL_ASSESSMENT: 0-10

## 2025-04-22 ASSESSMENT — PAIN SCALES - GENERAL: PAINLEVEL_OUTOF10: 3

## 2025-04-22 ASSESSMENT — PAIN DESCRIPTION - LOCATION: LOCATION: CHEST

## 2025-04-22 ASSESSMENT — PAIN DESCRIPTION - DESCRIPTORS: DESCRIPTORS: ACHING;DULL

## 2025-04-22 ASSESSMENT — PAIN DESCRIPTION - ORIENTATION: ORIENTATION: LEFT

## 2025-04-23 VITALS
HEIGHT: 64 IN | SYSTOLIC BLOOD PRESSURE: 123 MMHG | DIASTOLIC BLOOD PRESSURE: 54 MMHG | HEART RATE: 57 BPM | TEMPERATURE: 98.8 F | RESPIRATION RATE: 18 BRPM | OXYGEN SATURATION: 95 % | BODY MASS INDEX: 33.05 KG/M2 | WEIGHT: 193.56 LBS

## 2025-04-23 LAB
ALBUMIN SERPL-MCNC: 4.2 G/DL (ref 3.5–5)
ALBUMIN/GLOB SERPL: 1.3 (ref 1.1–2.2)
ALP SERPL-CCNC: 79 U/L (ref 45–117)
ALT SERPL-CCNC: 26 U/L (ref 12–78)
ANION GAP SERPL CALC-SCNC: 7 MMOL/L (ref 2–12)
APTT PPP: 27.1 SEC (ref 22.1–31)
AST SERPL-CCNC: 19 U/L (ref 15–37)
BASOPHILS # BLD: 0.07 K/UL (ref 0–0.1)
BASOPHILS NFR BLD: 0.9 % (ref 0–1)
BILIRUB SERPL-MCNC: 0.4 MG/DL (ref 0.2–1)
BUN SERPL-MCNC: 16 MG/DL (ref 6–20)
BUN/CREAT SERPL: 18 (ref 12–20)
CALCIUM SERPL-MCNC: 9.1 MG/DL (ref 8.5–10.1)
CHLORIDE SERPL-SCNC: 102 MMOL/L (ref 97–108)
CK SERPL-CCNC: 167 U/L (ref 26–192)
CO2 SERPL-SCNC: 28 MMOL/L (ref 21–32)
COMMENT:: NORMAL
CREAT SERPL-MCNC: 0.88 MG/DL (ref 0.55–1.02)
D DIMER PPP FEU-MCNC: 0.33 MG/L FEU (ref 0–0.65)
DIFFERENTIAL METHOD BLD: NORMAL
EKG ATRIAL RATE: 65 BPM
EKG DIAGNOSIS: NORMAL
EKG P AXIS: 37 DEGREES
EKG P-R INTERVAL: 144 MS
EKG Q-T INTERVAL: 422 MS
EKG QRS DURATION: 90 MS
EKG QTC CALCULATION (BAZETT): 438 MS
EKG R AXIS: 19 DEGREES
EKG T AXIS: 28 DEGREES
EKG VENTRICULAR RATE: 65 BPM
EOSINOPHIL # BLD: 0.23 K/UL (ref 0–0.4)
EOSINOPHIL NFR BLD: 3.1 % (ref 0–7)
ERYTHROCYTE [DISTWIDTH] IN BLOOD BY AUTOMATED COUNT: 13.3 % (ref 11.5–14.5)
GLOBULIN SER CALC-MCNC: 3.3 G/DL (ref 2–4)
GLUCOSE SERPL-MCNC: 116 MG/DL (ref 65–100)
HCT VFR BLD AUTO: 40.3 % (ref 35–47)
HGB BLD-MCNC: 13.3 G/DL (ref 11.5–16)
IMM GRANULOCYTES # BLD AUTO: 0.03 K/UL (ref 0–0.04)
IMM GRANULOCYTES NFR BLD AUTO: 0.4 % (ref 0–0.5)
INR PPP: 1 (ref 0.9–1.1)
LYMPHOCYTES # BLD: 2.49 K/UL (ref 0.8–3.5)
LYMPHOCYTES NFR BLD: 33.1 % (ref 12–49)
MAGNESIUM SERPL-MCNC: 1.9 MG/DL (ref 1.6–2.4)
MCH RBC QN AUTO: 27.1 PG (ref 26–34)
MCHC RBC AUTO-ENTMCNC: 33 G/DL (ref 30–36.5)
MCV RBC AUTO: 82.2 FL (ref 80–99)
MONOCYTES # BLD: 0.62 K/UL (ref 0–1)
MONOCYTES NFR BLD: 8.2 % (ref 5–13)
NEUTS SEG # BLD: 4.08 K/UL (ref 1.8–8)
NEUTS SEG NFR BLD: 54.3 % (ref 32–75)
NRBC # BLD: 0 K/UL (ref 0–0.01)
NRBC BLD-RTO: 0 PER 100 WBC
PHOSPHATE SERPL-MCNC: 3.4 MG/DL (ref 2.6–4.7)
PLATELET # BLD AUTO: 305 K/UL (ref 150–400)
PMV BLD AUTO: 11.5 FL (ref 8.9–12.9)
POTASSIUM SERPL-SCNC: 3.2 MMOL/L (ref 3.5–5.1)
PROT SERPL-MCNC: 7.5 G/DL (ref 6.4–8.2)
PROTHROMBIN TIME: 10.7 SEC (ref 9.2–11.2)
RBC # BLD AUTO: 4.9 M/UL (ref 3.8–5.2)
SODIUM SERPL-SCNC: 137 MMOL/L (ref 136–145)
SPECIMEN HOLD: NORMAL
THERAPEUTIC RANGE: NORMAL SECS (ref 58–77)
TROPONIN I SERPL HS-MCNC: 18 NG/L (ref 0–51)
TROPONIN I SERPL HS-MCNC: 19 NG/L (ref 0–51)
WBC # BLD AUTO: 7.5 K/UL (ref 3.6–11)

## 2025-04-23 PROCEDURE — 85730 THROMBOPLASTIN TIME PARTIAL: CPT

## 2025-04-23 PROCEDURE — 93010 ELECTROCARDIOGRAM REPORT: CPT | Performed by: INTERNAL MEDICINE

## 2025-04-23 PROCEDURE — 6370000000 HC RX 637 (ALT 250 FOR IP): Performed by: EMERGENCY MEDICINE

## 2025-04-23 PROCEDURE — 85379 FIBRIN DEGRADATION QUANT: CPT

## 2025-04-23 PROCEDURE — 85610 PROTHROMBIN TIME: CPT

## 2025-04-23 RX ORDER — ASPIRIN 81 MG/1
324 TABLET, CHEWABLE ORAL ONCE
Status: COMPLETED | OUTPATIENT
Start: 2025-04-23 | End: 2025-04-23

## 2025-04-23 RX ADMIN — ASPIRIN 324 MG: 81 TABLET, CHEWABLE ORAL at 00:54

## 2025-04-23 ASSESSMENT — PAIN SCALES - GENERAL: PAINLEVEL_OUTOF10: 0

## 2025-04-23 NOTE — ED PROVIDER NOTES
[facility] EMERGENCY DEPARTMENT  EMERGENCY DEPARTMENT ENCOUNTER      Pt Name: Guillermina Henson  MRN: [id number]  Birthdate 1955  Date of evaluation: 4/22/2025  Provider: Rico Allan MD    CHIEF COMPLAINT       Chief Complaint   Patient presents with    Chest Pain         HISTORY OF PRESENT ILLNESS   (Location/Symptom, Timing/Onset, Context/Setting, Quality, Duration, Modifying Factors, Severity)  Note limiting factors.   70-year-old female with a past medical history significant for mitral valve repair, hypertension, diabetes, sleep apnea who presents the ER with complaint of chest pain that began approximately 5 to 6 hours ago described as dull and achiness, severity 4 out of 10, radiated to left arm and jaw, accompanied by nausea and diaphoresis.  She does complain of transient episode of tingling sensation to both feet.  She denies any fever, headache, sore throat, cough, congestion, nausea and vomiting, diarrhea constipation, neck and back pain, sick contact, recent travel, prior history of the same.            Review of External Medical Records:     Nursing Notes were reviewed.    REVIEW OF SYSTEMS    (2-9 systems for level 4, 10 or more for level 5)     Review of Systems   All other systems reviewed and are negative.      Except as noted above the remainder of the review of systems was reviewed and negative.       PAST MEDICAL HISTORY     Past Medical History:   Diagnosis Date    Adverse effect of anesthesia     DIFFICULTY WAKING     COVID-19 vaccine series completed     PFIZER  03/10/21 &03/31/21    Diabetes (HCC)     History of mitral valve repair 2011    Hypertension     Mitral valve disorders(424.0)     Other ill-defined conditions(799.89)     mitral valve regurg    Sleep apnea with use of continuous positive airway pressure (CPAP)     USES CPAP    Unspecified adverse effect of anesthesia     difficulty waking         SURGICAL HISTORY       Past Surgical History:   Procedure

## 2025-04-23 NOTE — ED TRIAGE NOTES
Pt reports left sided chest pain that radiates to the left arm and jaw  Pain started around 9pm , describes pain as an achy and pressure pain  Denies SOB  Also endorses tingling in the right foot , started around 9pm as well

## 2025-04-26 LAB
EKG ATRIAL RATE: 58 BPM
EKG DIAGNOSIS: NORMAL
EKG P AXIS: 32 DEGREES
EKG P-R INTERVAL: 148 MS
EKG Q-T INTERVAL: 454 MS
EKG QRS DURATION: 90 MS
EKG QTC CALCULATION (BAZETT): 445 MS
EKG R AXIS: 19 DEGREES
EKG T AXIS: 19 DEGREES
EKG VENTRICULAR RATE: 58 BPM

## 2025-04-28 ENCOUNTER — PROCEDURE VISIT (OUTPATIENT)
Age: 70
End: 2025-04-28

## 2025-04-28 DIAGNOSIS — F41.9 MILD ANXIETY: ICD-10-CM

## 2025-04-28 DIAGNOSIS — R41.9 COGNITIVE COMPLAINTS WITH NORMAL NEUROPSYCHOLOGICAL EXAM: Primary | ICD-10-CM

## 2025-05-07 NOTE — PROGRESS NOTES
A follow up Neuropsychological Evaluation is indicated on a prn basis, especially if there are any cognitive and/or emotional changes.      DIAGNOSES:  Cognitive Complaints With Normal Neuropsychological Evaluation     Mild Anxiety     The above information is based upon information currently available to me.  If there is any additional information of which I am currently unaware, I would be more than happy to review it upon having it made available to me.  Thank you for the opportunity to see this interesting individual.     Sincerely,       Lenny Azar PsyD, EdS    CC:  Marcela Knight MD    Time Documentation:      96138 x 1  96139 x 5 Test Administration/Data Gathering By Technician: (3 hours). 96138 x 1 (first 30 minutes), 96138 x 5 (each additional 30 minutes)    96132 x 1  96133 x 2 Testing Evaluation Services by Neuropsychologist (2 hour, 45 minutes) 96132 x 1 (first hour), 96133 x 2 (1 hour, 45 minutes)    Definitions:      87603/21365: Neuropsychological Test Administration by Technician/Psychometrist, first 30 minutes and each additional 30 minutes.     84514/78370:  Neurobehavioral Status Exam, Clinical interview.  Clinical assessment of thinking, reasoning and judgment, by neuropsychologist, both face to face time with patient and time interpreting those test results and reporting, first and subsequent hours. Record review.  Review of history provided by patient.  Review of collaborative information.  Testing by Clinician.  Review of raw data. Scoring. Report writing of individual tests administered by Clinician.  Integration of individual tests administered by psychometrist with NSE/testing by clinician, review of records/history/collaborative information, case Conceptualization, treatment planning, clinical decision making, report writing, coordination Of Care. Psychometry test codes as time spent by psychometrist administering and scoring neurocognitive/psychological tests under supervision of

## 2025-05-13 ENCOUNTER — OFFICE VISIT (OUTPATIENT)
Age: 70
End: 2025-05-13

## 2025-05-13 DIAGNOSIS — F41.9 MILD ANXIETY: ICD-10-CM

## 2025-05-13 DIAGNOSIS — R41.9 COGNITIVE COMPLAINTS WITH NORMAL NEUROPSYCHOLOGICAL EXAM: Primary | ICD-10-CM

## 2025-05-13 NOTE — PROGRESS NOTES
cognitive and/or emotional changes.       DIAGNOSES:             Cognitive Complaints With Normal Neuropsychological Evaluation                                      Mild Anxiety  1.We reviewed the findings from the evaluation including test results, diagnosis, and suspected contributing factors  2.Reviewed recommendations outlined in report  3.Answered questions to the best of my ability     The patient needs to:   1.Follow-up with referring provider for ongoing management  2.Follow up with treatment recommendations as outlined   3.Use practical compensatory methods to compensate for cognitive changes  4.Emphasize modifiable protective behaviors for cognitive functioning such as exercise, diet, and cognitive stimulation     Patient's response to recommendations: Patient voiced understanding    Neuropsychological Mental Status Exam (NMSE):        Historian: Good  Praxis: No UE apraxia  R/L Orientation: Intact to self and to other  Dress: within normal limits   Weight: Overweight   Appearance/Hygiene: within normal limits   Gait: within normal limits   Assistive Devices: NGlasses  Mood: within normal limits   Affect: within normal limits   Comprehension: within normal limits   Thought Process: within normal limits   Expressive Language: within normal limits   Receptive Language: within normal limits   Motor:  No cognitive or motor perseveration  Insight: Within normal limits  Judgment: Within normal limits  Other Psych:                     The patient had the following concerns which I deferred to their referring provider:   1.Medications for cognition       BILLING:  96132 x 1 Units     *Code 22294: Neuropsychological testing evaluation services include: Integration of patient data, interpretation of standardized test results and clinical data, clinical decision-making, treatment planning and report, and interactive feedback to the patient, family member(s) or caregiver(s), when performed.    On this date 5/13/2025

## (undated) DEVICE — COVER US PRB W12XL244CM FLD IORT STR TIP

## (undated) DEVICE — GLOVE,SURG,SENSICARE,ALOE,LF,PF,7: Brand: MEDLINE

## (undated) DEVICE — HOOK RETRCT L5MM E SHRP SELF RET SYS LONE STAR

## (undated) DEVICE — THYROID - SMH AMB: Brand: MEDLINE INDUSTRIES, INC.

## (undated) DEVICE — SUTURE MCRYL SZ 4-0 L27IN ABSRB UD L19MM PS-2 1/2 CIR PRIM Y426H

## (undated) DEVICE — SUTURE VCRL SZ 3-0 L27IN ABSRB UD L26MM SH 1/2 CIR J416H

## (undated) DEVICE — AGENT HEMSTAT W2XL3IN OXIDIZED REGENERATED CELOS ABSRB

## (undated) DEVICE — EMG TUBE 8229707 NIM TRIVANTAGE 7.0MM ID: Brand: NIM TRIVANTAGE™

## (undated) DEVICE — SOLUTION IRRIG 1000ML 0.9% SOD CHL USP POUR PLAS BTL

## (undated) DEVICE — SMOKE EVACUATION PENCIL: Brand: VALLEYLAB

## (undated) DEVICE — TOWEL SURG W17XL27IN STD BLU COT NONFENESTRATED PREWASHED

## (undated) DEVICE — SPONGE GZ W4XL4IN COT RADPQ HIGHLY ABSRB

## (undated) DEVICE — MAGNETIC INSTR DRAPE 20X16: Brand: MEDLINE INDUSTRIES, INC.

## (undated) DEVICE — MASTISOL ADHESIVE LIQ 2/3ML

## (undated) DEVICE — STRIP,CLOSURE,WOUND,MEDI-STRIP,1/2X4: Brand: MEDLINE

## (undated) DEVICE — SYR 10ML LUER LOK 1/5ML GRAD --

## (undated) DEVICE — SHEAR RMFG HARMONIC FOCUS 9CM -- OEM ITEM L#322125

## (undated) DEVICE — SUTURE ETHLN SZ 4-0 L18IN NONABSORBABLE BLK L19MM PS-2 3/8 1667H

## (undated) DEVICE — PROBE PTEYE-1 FIBER OPTIC: Brand: PTEYE

## (undated) DEVICE — PREP SKN CHLRAPRP APL 26ML STR --